# Patient Record
Sex: FEMALE | Race: WHITE | Employment: OTHER | URBAN - METROPOLITAN AREA
[De-identification: names, ages, dates, MRNs, and addresses within clinical notes are randomized per-mention and may not be internally consistent; named-entity substitution may affect disease eponyms.]

---

## 2017-11-20 ENCOUNTER — HOSPITAL ENCOUNTER (INPATIENT)
Facility: HOSPITAL | Age: 72
LOS: 9 days | Discharge: NURSING, REHAB OR LONG-TERM CARE | DRG: 987 | End: 2017-11-29
Attending: EMERGENCY MEDICINE | Admitting: INTERNAL MEDICINE
Payer: MEDICARE

## 2017-11-20 ENCOUNTER — APPOINTMENT (OUTPATIENT)
Dept: GENERAL RADIOLOGY | Facility: HOSPITAL | Age: 72
DRG: 987 | End: 2017-11-20
Attending: EMERGENCY MEDICINE
Payer: MEDICARE

## 2017-11-20 ENCOUNTER — APPOINTMENT (OUTPATIENT)
Dept: ULTRASOUND IMAGING | Facility: HOSPITAL | Age: 72
DRG: 987 | End: 2017-11-20
Attending: EMERGENCY MEDICINE
Payer: MEDICARE

## 2017-11-20 ENCOUNTER — APPOINTMENT (OUTPATIENT)
Dept: CV DIAGNOSTICS | Facility: HOSPITAL | Age: 72
DRG: 987 | End: 2017-11-20
Attending: INTERNAL MEDICINE
Payer: MEDICARE

## 2017-11-20 DIAGNOSIS — S92.345A CLOSED NONDISPLACED FRACTURE OF FOURTH METATARSAL BONE OF LEFT FOOT, INITIAL ENCOUNTER: ICD-10-CM

## 2017-11-20 DIAGNOSIS — Z78.0 POST-MENOPAUSAL: ICD-10-CM

## 2017-11-20 DIAGNOSIS — R29.6 RECURRENT FALLS: ICD-10-CM

## 2017-11-20 DIAGNOSIS — N30.00 ACUTE CYSTITIS WITHOUT HEMATURIA: ICD-10-CM

## 2017-11-20 DIAGNOSIS — I10 UNCONTROLLED HYPERTENSION: ICD-10-CM

## 2017-11-20 DIAGNOSIS — J81.0 ACUTE PULMONARY EDEMA (HCC): Primary | ICD-10-CM

## 2017-11-20 DIAGNOSIS — N95.0 POSTMENOPAUSAL BLEEDING: ICD-10-CM

## 2017-11-20 PROBLEM — S92.902A FOOT FRACTURE, LEFT, CLOSED, INITIAL ENCOUNTER: Status: ACTIVE | Noted: 2017-11-20

## 2017-11-20 PROBLEM — D50.0 IRON DEFICIENCY ANEMIA DUE TO CHRONIC BLOOD LOSS: Chronic | Status: ACTIVE | Noted: 2017-11-20

## 2017-11-20 PROBLEM — E11.9 TYPE 2 DIABETES MELLITUS WITHOUT COMPLICATION (HCC): Chronic | Status: ACTIVE | Noted: 2017-11-20

## 2017-11-20 PROCEDURE — 73630 X-RAY EXAM OF FOOT: CPT | Performed by: EMERGENCY MEDICINE

## 2017-11-20 PROCEDURE — 76856 US EXAM PELVIC COMPLETE: CPT | Performed by: EMERGENCY MEDICINE

## 2017-11-20 PROCEDURE — 93306 TTE W/DOPPLER COMPLETE: CPT | Performed by: INTERNAL MEDICINE

## 2017-11-20 PROCEDURE — 76830 TRANSVAGINAL US NON-OB: CPT | Performed by: EMERGENCY MEDICINE

## 2017-11-20 PROCEDURE — 71010 XR CHEST AP PORTABLE  (CPT=71010): CPT | Performed by: EMERGENCY MEDICINE

## 2017-11-20 PROCEDURE — 73590 X-RAY EXAM OF LOWER LEG: CPT | Performed by: EMERGENCY MEDICINE

## 2017-11-20 PROCEDURE — 72170 X-RAY EXAM OF PELVIS: CPT | Performed by: EMERGENCY MEDICINE

## 2017-11-20 RX ORDER — DEXTROSE MONOHYDRATE 25 G/50ML
50 INJECTION, SOLUTION INTRAVENOUS AS NEEDED
Status: DISCONTINUED | OUTPATIENT
Start: 2017-11-20 | End: 2017-11-29

## 2017-11-20 RX ORDER — NITROGLYCERIN 0.4 MG/1
0.4 TABLET SUBLINGUAL EVERY 5 MIN PRN
Status: DISCONTINUED | OUTPATIENT
Start: 2017-11-20 | End: 2017-11-29

## 2017-11-20 RX ORDER — MEDROXYPROGESTERONE ACETATE 5 MG/1
10 TABLET ORAL DAILY
Status: DISCONTINUED | OUTPATIENT
Start: 2017-11-20 | End: 2017-11-29

## 2017-11-20 RX ORDER — FUROSEMIDE 10 MG/ML
40 INJECTION INTRAMUSCULAR; INTRAVENOUS ONCE
Status: COMPLETED | OUTPATIENT
Start: 2017-11-20 | End: 2017-11-20

## 2017-11-20 RX ORDER — FUROSEMIDE 10 MG/ML
40 INJECTION INTRAMUSCULAR; INTRAVENOUS DAILY
Status: DISCONTINUED | OUTPATIENT
Start: 2017-11-20 | End: 2017-11-21

## 2017-11-20 RX ORDER — ATORVASTATIN CALCIUM 40 MG/1
40 TABLET, FILM COATED ORAL DAILY
Status: ON HOLD | COMMUNITY
End: 2018-02-08

## 2017-11-20 RX ORDER — ATORVASTATIN CALCIUM 40 MG/1
40 TABLET, FILM COATED ORAL NIGHTLY
Status: DISCONTINUED | OUTPATIENT
Start: 2017-11-20 | End: 2017-11-29

## 2017-11-20 RX ORDER — AMLODIPINE BESYLATE 5 MG/1
5 TABLET ORAL ONCE
Status: COMPLETED | OUTPATIENT
Start: 2017-11-20 | End: 2017-11-20

## 2017-11-20 RX ORDER — MELATONIN
650
COMMUNITY
End: 2017-11-29

## 2017-11-20 RX ORDER — ACETAMINOPHEN 325 MG/1
650 TABLET ORAL EVERY 6 HOURS PRN
Status: DISCONTINUED | OUTPATIENT
Start: 2017-11-20 | End: 2017-11-29

## 2017-11-20 RX ORDER — GLIPIZIDE 10 MG/1
20 TABLET ORAL
COMMUNITY
End: 2017-11-29

## 2017-11-20 RX ORDER — GLIPIZIDE 10 MG/1
10 TABLET ORAL
Status: DISCONTINUED | OUTPATIENT
Start: 2017-11-20 | End: 2017-11-22

## 2017-11-20 RX ORDER — RAMIPRIL 10 MG/1
20 CAPSULE ORAL DAILY
COMMUNITY
End: 2017-11-29

## 2017-11-20 RX ORDER — ENALAPRILAT 2.5 MG/2ML
1.25 INJECTION INTRAVENOUS ONCE
Status: COMPLETED | OUTPATIENT
Start: 2017-11-20 | End: 2017-11-20

## 2017-11-20 RX ORDER — AMLODIPINE BESYLATE 5 MG/1
5 TABLET ORAL 2 TIMES DAILY
Status: DISCONTINUED | OUTPATIENT
Start: 2017-11-20 | End: 2017-11-29

## 2017-11-20 RX ORDER — SODIUM CHLORIDE 0.9 % (FLUSH) 0.9 %
3 SYRINGE (ML) INJECTION AS NEEDED
Status: DISCONTINUED | OUTPATIENT
Start: 2017-11-20 | End: 2017-11-29

## 2017-11-20 RX ORDER — MELATONIN
650 2 TIMES DAILY WITH MEALS
Status: DISCONTINUED | OUTPATIENT
Start: 2017-11-20 | End: 2017-11-29

## 2017-11-20 RX ORDER — RAMIPRIL 10 MG/1
20 CAPSULE ORAL DAILY
Status: DISCONTINUED | OUTPATIENT
Start: 2017-11-20 | End: 2017-11-21

## 2017-11-20 NOTE — CONSULTS
Juan NOTE  Cardiology Consultation    Umberto Mendez Patient Status:  Emergency    1945 MRN [id number]   Location [address] Attending Ruy Isaacs MD   Hosp Day # 0 PCP Janeth Stahl MD     Reason f chair most of the time.   Will gently diurese with IV Lasix 40 mg daily and keep her legs elevated one half hours per shift will follow renal function carefully    Hypertension poorly controlled and pull poorly followed will adjust her medicines here and sh headaches  ENT no sinusitis   Chest no cough no hemoptysis  GI no hematemesis no melena   no dysuria hematuria patient admits to vaginal bleeding    Neurologic no TIA-like symptoms  Skin no rashes       Intake/Output:   No intake or output data in the 24

## 2017-11-20 NOTE — PLAN OF CARE
Problem: Diabetes/Glucose Control  Goal: Glucose maintained within prescribed range  INTERVENTIONS:  - Monitor Blood Glucose as ordered  - Assess for signs and symptoms of hyperglycemia and hypoglycemia  - Administer ordered medications to maintain glucose and limitations with patient/family  Outcome: Progressing    Goal: Maintain proper alignment of affected body part  INTERVENTIONS:  - Support and protect limb and body alignment per provider's orders  - Instruct and reinforce with patient and family use of

## 2017-11-20 NOTE — ED PROVIDER NOTES
Patient Seen in: [facility] AND [facility] Emergency Department    History   Patient presents with:  Fall (musculoskeletal, neurologic)    Stated Complaint: fall; knees gave out    HPI    74year old female with osteoarthritis s/p R TKA, DM, HTN, hyperlipidemia, Eyes: Conjunctivae and EOM are normal. Pupils are equal, round, and reactive to light. Neck: Normal range of motion and full passive range of motion without pain. Neck supple.    Cardiovascular: Normal rate, regular rhythm, normal heart sounds and intact RBC URINE 396 (*)     Bacteria Urine Moderate (*)     All other components within normal limits   BNP (B TYPE NATRIUERTIC PEPTIDE) - Abnormal; Notable for the following:     Beta Natriuretic Peptide 922 (*)     All other components within normal limits ED Course as of Nov 20 2037  ------------------------------------------------------------       [facility]       Pulse Ox: 99%, Normal, RA    Cardiac Monitor: Pulse Readings from Last 1 Encounters:  11/20/17 : 74  , sinus, normal for rate and rhythm     Radiology CONCLUSION:  1. Limited evaluation secondary to suboptimal inspiration and patient rotation. . 2. Mild cardiomegaly which is likely exaggerated by the semiupright technique. 3. Minimal left lateral pleural thickening (lower left hemithorax).  4. No evidence

## 2017-11-20 NOTE — CONSULTS
Jimmy An  [id number]  2017  4:16 PM    GYNE H&P    The patient is a 74year old  presenting with PMB. She reports the bleeding began as spotting about 3 years ago and has slowly increased since.   She wears a poise pad daily and dose not She prefers to attempt office EMB first.  We reviewed possible connection between her bleeding and her anemia.   The above was also d/w Dr. Nancy Castro who agrees that biopsy can be deferred until her current medical condition improves, at which time we will a

## 2017-11-20 NOTE — ED NOTES
Pt voided x 1; vaginal bleeding noted - per patient, that has been an ongoing problem for the last few years. She was suppose to see a gynecologist but never got around to it.

## 2017-11-20 NOTE — PROGRESS NOTES
[facility] Pharmacy Note: Antimicrobial Weight Dose Adjustment for: Rocephin (ceftriaxone)    Bon Davis is a 74year old female who has been prescribed Rocephin (ceftriaxone) 1 gm every x1 dose.   CrCl is estimated creatinine clearance is 28.4 mL/min (based on S

## 2017-11-21 ENCOUNTER — APPOINTMENT (OUTPATIENT)
Dept: ULTRASOUND IMAGING | Facility: HOSPITAL | Age: 72
DRG: 987 | End: 2017-11-21
Attending: INTERNAL MEDICINE
Payer: MEDICARE

## 2017-11-21 PROBLEM — E87.5 HYPERKALEMIA: Status: ACTIVE | Noted: 2017-11-21

## 2017-11-21 PROCEDURE — 5A09357 ASSISTANCE WITH RESPIRATORY VENTILATION, LESS THAN 24 CONSECUTIVE HOURS, CONTINUOUS POSITIVE AIRWAY PRESSURE: ICD-10-PCS | Performed by: INTERNAL MEDICINE

## 2017-11-21 PROCEDURE — 99223 1ST HOSP IP/OBS HIGH 75: CPT | Performed by: INTERNAL MEDICINE

## 2017-11-21 PROCEDURE — 76770 US EXAM ABDO BACK WALL COMP: CPT | Performed by: INTERNAL MEDICINE

## 2017-11-21 RX ORDER — HYDRALAZINE HYDROCHLORIDE 50 MG/1
50 TABLET, FILM COATED ORAL EVERY 8 HOURS SCHEDULED
Status: DISCONTINUED | OUTPATIENT
Start: 2017-11-21 | End: 2017-11-24

## 2017-11-21 RX ORDER — HEPARIN SODIUM 5000 [USP'U]/ML
5000 INJECTION, SOLUTION INTRAVENOUS; SUBCUTANEOUS EVERY 12 HOURS SCHEDULED
Status: DISCONTINUED | OUTPATIENT
Start: 2017-11-21 | End: 2017-11-27

## 2017-11-21 RX ORDER — FUROSEMIDE 10 MG/ML
20 INJECTION INTRAMUSCULAR; INTRAVENOUS DAILY
Status: DISCONTINUED | OUTPATIENT
Start: 2017-11-22 | End: 2017-11-22

## 2017-11-21 NOTE — PROGRESS NOTES
74year old  Admitted for evaluation of Acute pulmonary edema ([facility] [address] . Condition is stable.  . For details see H & P.

## 2017-11-21 NOTE — H&P
[address] [address] Patient Status:  Inpatient    1945 MRN [id number]   Location [facility] 3W/SW Attending Luis Padron MD   Hosp Day # 0 PCP Diann Hayden MD     Date:  2017  Date of Admi Gastrointestinal: Negative for heartburn, nausea, abdominal pain and blood in stool. Genitourinary: Negative for frequency. Musculoskeletal: Positive for myalgias, back pain, joint pain and gait problem. Left foot   Skin: Negative for rash. knee. 3. Severe tibiotalar, subtalar joint and hindfoot degeneration, left ankle. Xr Pelvis (1 View) (cpt=72170)    Result Date: 11/20/2017  CONCLUSION:  1. No acute fracture or subluxation.          Us Pelvis Ev (trns Abd And Endovag) (EET=19631,73

## 2017-11-21 NOTE — PROGRESS NOTES
Bon Ryan  [id number]  November 21, 2017  7:13 AM      GYN Progress Note    Subjective:  No complaints, resting well  Objective:  /52 (BP Location: Right arm)   Pulse 67   Temp 98 °F (36.7 °C) (Oral)   Resp 18   Ht 5' 3\" (1.6 m)   Wt (!) 383 lb 4. 8

## 2017-11-21 NOTE — PROGRESS NOTES
[facility]    Cardiology Progress Note    Cari Valadez Patient Status:  Inpatient    1945 MRN [id number]   Location [facility] 3W/SW Attending Jaiden Ritchie MD   Hosp Day # 1 PCP Kamila Beach MD     Patient Active Problem Vahe Encinas regular rate and rhythm,no pathologic murmur  Abd: Abdomen soft, nontender, nondistended,  bowel sounds present  Ext: Bilateral pedal edema  Neuro: no focal deficits   Skin: no rashes or lesions      Scheduled Meds:   • hydrALAzine HCl  50 mg Oral [facility] (lbq=[id number])    Result Date: 11/20/2017  CONCLUSION:  1. Acute nondisplaced fracture distal metadiaphyseal region of left fourth metatarsal. 2. Subtle nondisplaced fractures involving the metaphyseal bases of the left second third and fourth metatarsals.  3. -possible pulmonary disease.  ABNORMAL No previous ECGs available Electronically signed on 11/20/2017 at 18:26 by Moris Aguirre MD  11/21/2017

## 2017-11-21 NOTE — CM/SW NOTE
KANA met with the patient at bedside today. The patient lives at home with her daughter. She has a cane and walker at home and has some assistance from her daughter with showering. She has a h/o outpatient therapy as well.  Patient was transferred to CCU an

## 2017-11-21 NOTE — CONSULTS
[facility]    Report of Consultation    Bon Davis Patient Status:  Inpatient    1945 MRN [id number]   Location [facility] 2W/SW Attending Judy Valadez MD   [facility] Day # 1 PCP Mary Aguero MD     Date of Admission:   acetaminophen (TYLENOL) tab 650 mg 650 mg Oral Q6H PRN   dextrose 50% injection 50 mL 50 mL Intravenous PRN   Glucose-Vitamin C (DEX-4) 4-0.006 g chewable tab 4 tablet 4 tablet Oral Q15 Min PRN   Insulin Aspart Pen (NOVOLOG) 100 UNIT/ML flexpen 1-7 Units Data    Lab Results  Component Value Date   WBC 14.6 (H) 11/21/2017   HGB 8.9 (L) 11/21/2017   HCT 28.9 (L) 11/21/2017    11/21/2017   CREATSERUM 1.54 (H) 11/21/2017   BUN 36 (H) 11/21/2017    11/21/2017   K 5.8 (H) 11/21/2017    11/21/2 Mild cardiomegaly which is likely exaggerated by the semiupright technique. 3. Minimal left lateral pleural thickening (lower left hemithorax). 4. No evidence for pneumonia or pneumothorax. Assessment   1.   Acute hypercapnic respiratory failure

## 2017-11-21 NOTE — PROGRESS NOTES
[facility]    Progress Note    Radha Marcano Patient Status:  Inpatient    1945 MRN [id number]   Location [facility] 3W/SW Attending Eduardo Arizmendi MD   Hosp Day # 1 PCP Ramos Alaniz MD       Interval History:   Sleepy this mo results for input(s): ALKPHO, AST, ALT, BILT, TP, PT, INR, PTT in the last 72 hours. Xr Foot, Complete (min 3 Views), Left (cpt=73630)    Result Date: 11/20/2017  CONCLUSION:  1.  Acute nondisplaced fracture distal metadiaphyseal region of left fourth me progression -may be secondary to pulmonary disease consider old anterior infarct. Low voltage with rightward P-axis and rotation -possible pulmonary disease.  ABNORMAL No previous ECGs available Electronically signed on 11/20/2017 at 18:26 by Sarah You

## 2017-11-21 NOTE — PLAN OF CARE
Patient transferred to room 201, on continuous bipap for continuous monitoring. Alert/oriented x4, ABGs improved since AM.  Report called to [facility] . Belongings sent with patient.  Manpreet Cedillo MD notified. Selena Hester, [address]eban

## 2017-11-22 PROCEDURE — 99232 SBSQ HOSP IP/OBS MODERATE 35: CPT | Performed by: INTERNAL MEDICINE

## 2017-11-22 RX ORDER — MAGNESIUM SULFATE HEPTAHYDRATE 40 MG/ML
2 INJECTION, SOLUTION INTRAVENOUS
Status: DISPENSED | OUTPATIENT
Start: 2017-11-22 | End: 2017-11-22

## 2017-11-22 RX ORDER — FUROSEMIDE 10 MG/ML
40 INJECTION INTRAMUSCULAR; INTRAVENOUS
Status: DISCONTINUED | OUTPATIENT
Start: 2017-11-22 | End: 2017-11-27

## 2017-11-22 NOTE — OCCUPATIONAL THERAPY NOTE
Attempted to see pt for OT eval. Pt has closed fx of 4th metatarsal and has podiatry on consult. Will await clarification/ weight bearing orders prior to initiation of therapy evaluation. RUDY Shaffer made aware. Will continue to follow.

## 2017-11-22 NOTE — PROGRESS NOTES
[facility]    Progress Note    Umberto Mendez Patient Status:  Inpatient    1945 MRN [id number]   Location [facility] 2W/SW Attending Eliecer Myers MD   Hosp Day # 2 PCP Janeth Stahl MD       SUBJECTIVE:  Feeling somewhat be Besylate (NORVASC) tab 5 mg 5 mg Oral BID   Normal Saline Flush 0.9 % injection 3 mL 3 mL Intravenous PRN   acetaminophen (TYLENOL) tab 650 mg 650 mg Oral Q6H PRN   dextrose 50% injection 50 mL 50 mL Intravenous PRN   Glucose-Vitamin C (DEX-4) 4-0.006 g ch inpatient services that will reasonably be expected to span two midnight's based on the clinical documentation in H+P. Based on patients current state of illness, I anticipate that, after discharge, patient will require TBD.         [facility]  11/22/2

## 2017-11-22 NOTE — PROGRESS NOTES
[facility]    Cardiology Progress Note    Matthew Plaza Patient Status:  Inpatient    1945 MRN [id number]   Location [facility] 2W/SW Attending Preeti Wagner MD   Hosp Day # 2 PCP Candance Shack, MD     Patient Active Problem Jade Mercy Meds:   • furosemide  40 mg Intravenous BID (Diuretic)   • hydrALAzine HCl  50 mg Oral Q8H [facility]   • Heparin Sodium (Porcine)  5,000 Units Subcutaneous 2 times per day   • AmLODIPine Besylate  5 mg Oral BID   • Insulin Aspart Pen  1-7 Units Subcutaneous TID region of left fourth metatarsal. 2. Subtle nondisplaced fractures involving the metaphyseal bases of the left second third and fourth metatarsals.  3. Chronic pes planus deformity, left foot with advanced osteoarthritis changes involving the subtalar joint

## 2017-11-22 NOTE — PHYSICAL THERAPY NOTE
Attempted physical therapy evaluation. Per chart patient with closed fracture of left 4th metatarsal. Patient has podiatry on consult per physician progress note.  Will re-attempt as patient is appropriate for physical therapy and with clarified weight bear

## 2017-11-23 PROCEDURE — 30233N1 TRANSFUSION OF NONAUTOLOGOUS RED BLOOD CELLS INTO PERIPHERAL VEIN, PERCUTANEOUS APPROACH: ICD-10-PCS | Performed by: INTERNAL MEDICINE

## 2017-11-23 PROCEDURE — 99232 SBSQ HOSP IP/OBS MODERATE 35: CPT | Performed by: INTERNAL MEDICINE

## 2017-11-23 RX ORDER — 0.9 % SODIUM CHLORIDE 0.9 %
VIAL (ML) INJECTION
Status: COMPLETED
Start: 2017-11-23 | End: 2017-11-23

## 2017-11-23 RX ORDER — ONDANSETRON 2 MG/ML
4 INJECTION INTRAMUSCULAR; INTRAVENOUS EVERY 6 HOURS PRN
Status: DISCONTINUED | OUTPATIENT
Start: 2017-11-23 | End: 2017-11-29

## 2017-11-23 RX ORDER — ONDANSETRON 2 MG/ML
INJECTION INTRAMUSCULAR; INTRAVENOUS
Status: COMPLETED
Start: 2017-11-23 | End: 2017-11-23

## 2017-11-23 RX ORDER — SODIUM CHLORIDE 0.9 % (FLUSH) 0.9 %
10 SYRINGE (ML) INJECTION AS NEEDED
Status: DISCONTINUED | OUTPATIENT
Start: 2017-11-23 | End: 2017-11-29

## 2017-11-23 RX ORDER — SODIUM CHLORIDE 9 MG/ML
INJECTION, SOLUTION INTRAVENOUS ONCE
Status: COMPLETED | OUTPATIENT
Start: 2017-11-23 | End: 2017-11-23

## 2017-11-23 RX ORDER — DEXTROSE AND SODIUM CHLORIDE 5; .45 G/100ML; G/100ML
INJECTION, SOLUTION INTRAVENOUS CONTINUOUS
Status: DISCONTINUED | OUTPATIENT
Start: 2017-11-23 | End: 2017-11-24

## 2017-11-23 NOTE — PROGRESS NOTES
[facility]     Progress Note        Jessica William Patient Status:  Inpatient    1945 MRN [id number]   Location [facility] 2W/SW Attending Leonides Booker MD   Hosp Day # 3 PCP Esteban Garcia MD       Subjective:   Patient seen an chloride 0.9 % 100 mL IVPB-minibag/addvantage 2 g Intravenous Q24H   medroxyPROGESTERone (PROVERA) tab 10 mg Oral Daily   influenza virus vaccine (FLUAD) ages 72 years and older inj 0.5ml 0.5 mL Intramuscular Prior to discharge       Continuous Infusions:

## 2017-11-23 NOTE — PLAN OF CARE
Problem: Diabetes/Glucose Control  Goal: Glucose maintained within prescribed range  INTERVENTIONS:  - Monitor Blood Glucose as ordered  - Assess for signs and symptoms of hyperglycemia and hypoglycemia  - Administer ordered medications to maintain glucose indicated  - Evaluate effectiveness of antiarrhythmic and heart rate control medications as ordered  - Initiate emergency measures for life threatening arrhythmias  - Monitor electrolytes and administer replacement therapy as ordered   Outcome: Progressing

## 2017-11-23 NOTE — PROGRESS NOTES
[facility]    Cardiology Progress Note    Ladan Form Patient Status:  Inpatient    1945 MRN [id number]   Location [facility] 2W/SW Attending Pedro Luis Chatman MD   Hosp Day # 3 PCP Brooklynn Nicholas MD     Patient Active Problem Spring Paige sulfate  650 mg Oral BID with meals   • cefTRIAXone  2 g Intravenous Q24H   • MedroxyPROGESTERone Acetate  10 mg Oral Daily         Results:        Recent Labs   Lab  11/20/17   0833  11/21/17   0632  11/22/17   0408  11/23/17   0428   WBC  8.0  14.6*  8.5

## 2017-11-23 NOTE — PROGRESS NOTES
[facility]    Progress Note    Chin Huerta Patient Status:  Inpatient    1945 MRN [id number]   Location [facility] 2W/SW Attending Kiran Esquivel MD   Hosp Day # 3 PCP Emelina Couch MD     Subjective:   Subjective:denies an

## 2017-11-24 PROBLEM — J81.0 ACUTE PULMONARY EDEMA (HCC): Status: RESOLVED | Noted: 2017-11-20 | Resolved: 2017-11-24

## 2017-11-24 PROBLEM — I27.20 PULMONARY HYPERTENSION, MODERATE TO SEVERE (HCC): Status: ACTIVE | Noted: 2017-11-24

## 2017-11-24 PROBLEM — I50.31 ACUTE DIASTOLIC CONGESTIVE HEART FAILURE (HCC): Status: ACTIVE | Noted: 2017-11-24

## 2017-11-24 PROBLEM — I10 UNCONTROLLED HYPERTENSION: Status: RESOLVED | Noted: 2017-11-20 | Resolved: 2017-11-24

## 2017-11-24 PROCEDURE — 99232 SBSQ HOSP IP/OBS MODERATE 35: CPT | Performed by: INTERNAL MEDICINE

## 2017-11-24 RX ORDER — MAGNESIUM OXIDE 400 MG (241.3 MG MAGNESIUM) TABLET
400 TABLET ONCE
Status: COMPLETED | OUTPATIENT
Start: 2017-11-24 | End: 2017-11-24

## 2017-11-24 RX ORDER — DOCUSATE SODIUM 100 MG/1
100 CAPSULE, LIQUID FILLED ORAL 2 TIMES DAILY
Status: DISCONTINUED | OUTPATIENT
Start: 2017-11-24 | End: 2017-11-29

## 2017-11-24 NOTE — PHYSICAL THERAPY NOTE
PHYSICAL THERAPY EVALUATION - INPATIENT     Room Number: 547/547-A  Evaluation Date: 11/24/2017  Type of Evaluation;  Initial   Physician Order: PT Eval and Treat    Presenting Problem: L foot 4th metatarsal fracture, biventricular failure, severe pulmo She could not recall the podiatrist coming in to see her this morning and was unaware of her weightbearing status.  PT instructed her in supine therex and overhead trapeze, attempted bed mobility (TOTAL A) and then attempted to use mechanics of the bed to b Foot fracture, left, closed, initial encounter    Essential hypertension    Type 2 diabetes mellitus without complication (HCC)    Iron deficiency anemia due to chronic blood loss    Acute cystitis without hematuria    Recurrent falls    Closed nondisplace is unable to actively flex the R knee but tolerates a few degrees of PROM. Note prior R TKA. R ankle- can actively DF/PF but lacks full range. Can only wiggle toes on LLE, no other active movement noted at L hip or knee.      Lower extremity strength is pro able to participate in transfers at this time due to body habitus and low level of arousal.     Exercise/Education Provided:  UE AROM, pull up on trapeze, AROM RLE, educated on WBS- poor return demo due to patient grogginess. Patient End of Session:  In

## 2017-11-24 NOTE — PROGRESS NOTES
[facility]    Progress Note    Jose Luis Esquivel Patient Status:  Inpatient    1945 MRN [id number]   Location [facility] 5SW/SE Attending Maggy North MD   Hosp Day # 4 PCP Josey Crockett MD     Interval History:   Not in distress GLU  51*   --   82  186*   BUN  38*   --   33*  32*   CREATSERUM  1.30   --   1.08  1.05   GFRAA  48*   --   60  >60   GFRNAA  40*   --   50*  51*   CA  8.0*   --   8.2*  8.2*   NA  141   --   138  135*   K  5.1   --   5.1  5.7*   CL  106   --   100  98

## 2017-11-24 NOTE — CM/SW NOTE
Podiatry recommendation is for pt to be non-weightbearing for 3-4weeks. Podiatrist had spoken with the pt about dme needs, such as knee scooter or wheelchair.  Both of these would be on a rental basis as pt's condition is not chronic to qualify for insuranc

## 2017-11-24 NOTE — PROGRESS NOTES
[facility]    Cardiology Progress Note    Vinh Duffy Patient Status:  Inpatient    1945 MRN [id number]   Location [facility] 5SW/SE Attending Dorothea Forbes MD   Hosp Day # 4 PCP Jennifer Cabrera MD     Patient Active Problem L with regular rate and rhythm,no pathologic murmur  Abd: Abdomen soft, nontender, nondistended,  bowel sounds present  Ext: A bit difficult to assess but probably still about 1+ bilateral pedal edema  Neuro: no focal deficits  Skin: no rashes or lesions 1540 (8.9) 336 (1.9)    Urine (mL/kg/hr) 2250 (0.5) 4375 (1.1) 1200 (0.9)    Emesis/NG output  0 (0)     Other  0 (0)     Total Output 2250 4375 1200    Net -8905 -6756 -864           Emesis Occurrence  1 x              No results for input(s): BNP in the

## 2017-11-24 NOTE — PROGRESS NOTES
[facility]     Progress Note         Sara Patient Status:  Inpatient    1945 MRN [id number]   Location [facility] 2W/SW Attending Flash Kent MD   Hosp Day # 4 PCP Mandy Hickey MD       Subjective:   Patient seen an 10 mg Oral Daily   influenza virus vaccine (FLUAD) ages 72 years and older inj 0.5ml 0.5 mL Intramuscular Prior to discharge       Continuous Infusions:       Physical Exam  Constitutional: no acute distress  HEENT: PERRL  Cardio: RRR, S1 S2  Respiratory:

## 2017-11-24 NOTE — CONSULTS
[facility]    Report of Consultation    Reginia Riser Patient Status:  Inpatient    1945 MRN [id number]   Location [facility] 5SW/SE Attending Nazia Nichole MD   [facility] Day # 4 PCP Alyce Jimenez MD     Date of Admission:  / Intravenous Q6H PRN   furosemide (LASIX) injection 40 mg 40 mg Intravenous BID (Diuretic)   Miconazole Nitrate 2 % powder  Topical Perez@[employer]   hydrALAzine HCl (APRESOLINE) tab 50 mg 50 mg Oral Q8H [facility]   Heparin Sodium (Porcine) 5000 UNIT/ML injection 5 seconds to all digits. Left Foot - DP pulse 1/4  PT pulse 1/4. Edema present to ankle, less notable to left foot than right. Capillary refill time <3 seconds to all digits.  Digital hair is absent to digits, bilateral. The skin is thin, shiny atrophic immobilization and protection. Discussed witih patient healing rate and that full healing would not be expected for 8-10 weeks. Discussed recommendation to avoid weightbearing on the left side.  Discussed roll-about knee scooter, which patient relates kn

## 2017-11-24 NOTE — PLAN OF CARE
Problem: Diabetes/Glucose Control  Goal: Glucose maintained within prescribed range  INTERVENTIONS:  - Monitor Blood Glucose as ordered  - Assess for signs and symptoms of hyperglycemia and hypoglycemia  - Administer ordered medications to maintain glucose function  INTERVENTIONS:  - Assess patient stability and activity tolerance for standing, transferring and ambulating w/ or w/o assistive devices  - Assist with transfers and ambulation using safe patient handling equipment as needed  - Ensure adequate pro

## 2017-11-25 PROCEDURE — 99232 SBSQ HOSP IP/OBS MODERATE 35: CPT | Performed by: INTERNAL MEDICINE

## 2017-11-25 RX ORDER — POLYETHYLENE GLYCOL 3350 17 G/17G
17 POWDER, FOR SOLUTION ORAL 2 TIMES DAILY
Status: DISCONTINUED | OUTPATIENT
Start: 2017-11-25 | End: 2017-11-29

## 2017-11-25 RX ORDER — MAGNESIUM OXIDE 400 MG (241.3 MG MAGNESIUM) TABLET
400 TABLET ONCE
Status: COMPLETED | OUTPATIENT
Start: 2017-11-25 | End: 2017-11-25

## 2017-11-25 NOTE — OCCUPATIONAL THERAPY NOTE
OCCUPATIONAL THERAPY EVALUATION - INPATIENT     Room Number: 547/547-A  Evaluation Date: 11/25/2017  Type of Evaluation: Initial  Presenting Problem: multiple falls, left 4th metatarsal fx    Physician Order: IP Consult to Occupational Therapy  Reason for Closed nondisplaced fracture of fourth metatarsal bone of left foot, initial encounter    Postmenopausal bleeding    Hyperkalemia    Pulmonary hypertension, moderate to severe    Acute diastolic congestive heart failure ([facility] [address]      Past Medical History  Pa NEUROLOGICAL FINDINGS  Neurological Findings: None                  ACTIVITIES OF DAILY LIVING ASSESSMENT  AM-PAC ‘6-Clicks’ Inpatient Daily Activity Short Form  How much help from another person does the patient currently need…  -   Putting on and darby 12/9/17  Frequency: 3-5x/week

## 2017-11-25 NOTE — PLAN OF CARE
Problem: Diabetes/Glucose Control  Goal: Glucose maintained within prescribed range  INTERVENTIONS:  - Monitor Blood Glucose as ordered  - Assess for signs and symptoms of hyperglycemia and hypoglycemia  - Administer ordered medications to maintain glucose of antiarrhythmic and heart rate control medications as ordered  - Initiate emergency measures for life threatening arrhythmias  - Monitor electrolytes and administer replacement therapy as ordered   Outcome: Progressing      Problem: MUSCULOSKELETAL - ULICES and document risk factors for pressure ulcer development  - Assess and document skin integrity  - Monitor for areas of redness and/or skin breakdown  - Initiate interventions, skin care algorithm/standards of care as needed   Outcome: Progressing    Goal:

## 2017-11-25 NOTE — PROGRESS NOTES
[facility]    Progress Note    Jessica William Patient Status:  Inpatient    1945 MRN [id number]   Location [facility] 5SW/SE Attending Leonides Booker MD   Hosp Day # 5 PCP Esteban Garcia MD       Subjective:   Jessica William is a(n)  (H) 11/25/2017   CA 8.2 (L) 11/25/2017   TSH 3.15 11/21/2017   MG 1.8 11/25/2017   TROP 0.03 11/20/2017                   CHRISTINA EDGE MD  11/25/2017

## 2017-11-25 NOTE — PROGRESS NOTES
[facility]     Progress Note        Bill Reyna Patient Status:  Inpatient    1945 MRN [id number]   Location [facility] 2W/SW Attending Wendolyn Sever, MD   Hosp Day # 5 PCP Charlene Kaye MD       Subjective:   Patient seen an Oral Daily   influenza virus vaccine (FLUAD) ages 72 years and older inj 0.5ml 0.5 mL Intramuscular Prior to discharge       Continuous Infusions:       Physical Exam  Constitutional: no acute distress  HEENT: PERRL  Cardio: RRR, S1 S2  Respiratory: clear

## 2017-11-25 NOTE — PLAN OF CARE
Problem: Diabetes/Glucose Control  Goal: Glucose maintained within prescribed range  INTERVENTIONS:  - Monitor Blood Glucose as ordered  - Assess for signs and symptoms of hyperglycemia and hypoglycemia  - Administer ordered medications to maintain glucose w/ or w/o assistive devices  - Assist with transfers and ambulation using safe patient handling equipment as needed  - Ensure adequate protection for wounds/incisions during mobilization  - Obtain PT/OT consults as needed  - Advance activity as appropriate

## 2017-11-25 NOTE — PROGRESS NOTES
[facility] Heart Cardiology Progress Note      Julia Robles Patient Status:  Inpatient    1945 MRN [id number]   Location [facility] 5SW/SE Attending Amanda Hahn MD   Hosp Day # 5 PCP Emily Aguiar MD     ------ no clubbing, no cyanosis, bilat LE edema, casted Left foot  Neuro: no focal deficits  Skin: no rashes or lesions    Scheduled Meds:   • magnesium oxide  400 mg Oral Once   • docusate sodium  100 mg Oral BID   • hydrALAzine HCl  75 mg Oral Q8H [address]   • furo

## 2017-11-26 PROCEDURE — 99232 SBSQ HOSP IP/OBS MODERATE 35: CPT | Performed by: INTERNAL MEDICINE

## 2017-11-26 RX ORDER — ISOSORBIDE DINITRATE 10 MG/1
10 TABLET ORAL
Status: DISCONTINUED | OUTPATIENT
Start: 2017-11-26 | End: 2017-11-29

## 2017-11-26 NOTE — PLAN OF CARE
Patient refuse to use BIPAP. Educated her about the importance of the Bipap machine,  patient still declines to use.

## 2017-11-26 NOTE — PROGRESS NOTES
[facility]    Progress Note    Lexi Feliciano Patient Status:  Inpatient    1945 MRN [id number]   Location [facility] 5SW/SE Attending Diana Casanova MD   Hosp Day # 6 PCP Simba Purcell MD       Subjective:   Lexi Feliciano is a(n) 11/26/2017   HGB 7.8 (L) 11/26/2017   HCT 24.4 (L) 11/26/2017    11/26/2017   CREATSERUM 1.09 11/26/2017   BUN 36 (H) 11/26/2017    (L) 11/26/2017   K 5.9 (H) 11/26/2017   CL 92 (L) 11/26/2017   CO2 31 11/26/2017    (H) 11/26/2017   CA

## 2017-11-26 NOTE — PROGRESS NOTES
[facility] Heart Cardiology Progress Note      Norah Herron Patient Status:  Inpatient    1945 MRN [id number]   Location [facility] 5SW/SE Attending Amanda Hair MD   Hosp Day # 6 PCP Oscar West MD       ---- soft, nontender, nondistended, no organomegaly, bowel sounds present  Ext:  no clubbing, no cyanosis, 2+ bilat edema  Neuro: no focal deficits  Skin: no rashes or lesions    Scheduled Meds:   • PEG 3350  17 g Oral BID   • docusate sodium  100 mg Oral BID

## 2017-11-26 NOTE — PROGRESS NOTES
[facility] - [facility]     Progress Note        Cyndi Morse Patient Status:  Inpatient    1945 MRN [id number]   Location [facility] 2W/SW Attending Eddie Lutz MD   Hosp Day # 6 PCP Jason Blevins MD       Subjective:   Patient seen an ferrous sulfate EC tab 325 mg 650 mg Oral BID with meals   medroxyPROGESTERone (PROVERA) tab 10 mg Oral Daily   influenza virus vaccine (FLUAD) ages 72 years and older inj 0.5ml 0.5 mL Intramuscular Prior to discharge       Continuous Infusions:       Ph

## 2017-11-26 NOTE — PHYSICAL THERAPY NOTE
PHYSICAL THERAPY TREATMENT NOTE - INPATIENT    Room Number: 547/547-A       Presenting Problem: L foot 4th metatarsal fracture, biventricular failure, severe pulmonary hypertension, chronic renal insufficieny , fall at home    Problem List  Active Problem Static Sitting: Not tested  Dynamic Sitting: Not tested           Static Standing: Not tested  Dynamic Standing: Not tested    ACTIVITY TOLERANCE #2  Current Status NT   Goal #3 Patient to demonstrate active participation with home activity/exercise instructions provided to patient in preparation for discharge.    Goal #3   Current Status IN PROGRESS   Goal #4    Goal #4   Current Status    Goal #5

## 2017-11-26 NOTE — PLAN OF CARE
Problem: Diabetes/Glucose Control  Goal: Glucose maintained within prescribed range  INTERVENTIONS:  - Monitor Blood Glucose as ordered  - Assess for signs and symptoms of hyperglycemia and hypoglycemia  - Administer ordered medications to maintain glucose control medications as ordered  - Initiate emergency measures for life threatening arrhythmias  - Monitor electrolytes and administer replacement therapy as ordered   Outcome: Progressing      Problem: MUSCULOSKELETAL - ADULT  Goal: Return mobility to safe development  - Assess and document skin integrity  - Monitor for areas of redness and/or skin breakdown  - Initiate interventions, skin care algorithm/standards of care as needed   Outcome: Progressing    Goal: Oral mucous membranes remain intact  INTERVEN

## 2017-11-26 NOTE — PLAN OF CARE
Problem: Diabetes/Glucose Control  Goal: Glucose maintained within prescribed range  INTERVENTIONS:  - Monitor Blood Glucose as ordered  - Assess for signs and symptoms of hyperglycemia and hypoglycemia  - Administer ordered medications to maintain glucose Obtain PT/OT consults as needed  - Advance activity as appropriate  - Communicate ordered activity level and limitations with patient/family   Outcome: Progressing      Problem: RESPIRATORY - ADULT  Goal: Achieves optimal ventilation and oxygenation  INTER

## 2017-11-27 RX ORDER — SODIUM CHLORIDE 9 MG/ML
INJECTION, SOLUTION INTRAVENOUS ONCE
Status: COMPLETED | OUTPATIENT
Start: 2017-11-27 | End: 2017-11-27

## 2017-11-27 RX ORDER — SODIUM CHLORIDE 9 MG/ML
INJECTION, SOLUTION INTRAVENOUS
Status: COMPLETED
Start: 2017-11-27 | End: 2017-11-27

## 2017-11-27 RX ORDER — GLIMEPIRIDE 2 MG/1
2 TABLET ORAL
Status: DISCONTINUED | OUTPATIENT
Start: 2017-11-28 | End: 2017-11-29

## 2017-11-27 RX ORDER — SODIUM CHLORIDE 9 MG/ML
INJECTION, SOLUTION INTRAVENOUS
Status: DISPENSED
Start: 2017-11-27 | End: 2017-11-28

## 2017-11-27 RX ORDER — SODIUM CHLORIDE 0.9 % (FLUSH) 0.9 %
10 SYRINGE (ML) INJECTION AS NEEDED
Status: DISCONTINUED | OUTPATIENT
Start: 2017-11-27 | End: 2017-11-29

## 2017-11-27 RX ORDER — FUROSEMIDE 10 MG/ML
20 INJECTION INTRAMUSCULAR; INTRAVENOUS ONCE
Status: COMPLETED | OUTPATIENT
Start: 2017-11-27 | End: 2017-11-27

## 2017-11-27 NOTE — CM/SW NOTE
SW met w/ pt to discuss PT/OT recommendations of SNF. Pt agreeable. Pt has no hx of rehab. SW provided SNF list. Pt reported that her dtr will be at the hospital later and she will go over SNF list w/ dtr at this time. SW contacted [facility] for DON screen.

## 2017-11-27 NOTE — PROGRESS NOTES
[facility]    Cardiology Progress Note    Pedro Jordan Patient Status:  Inpatient    1945 MRN [id number]   Location [facility] 5SW/SE Attending Isabel Ho MD   Hosp Day # 7 PCP Charlie Obregon MD     Patient Active Problem L focal deficits  Skin: no rashes or lesions      Scheduled Meds:   • [START ON 11/28/2017] furosemide  60 mg Oral Daily   • isosorbide dinitrate  10 mg Oral TID (Nitrates)   • PEG 3350  17 g Oral BID   • docusate sodium  100 mg Oral BID   • hydrALAzine HCl

## 2017-11-27 NOTE — PROGRESS NOTES
[facility]    Progress Note    Lowell Phelan Patient Status:  Inpatient    1945 MRN [id number]   Location [facility] 5SW/SE Attending Jody Park MD   Hosp Day # 7 PCP Meggan Castillo MD     Interval History:   Not in distress Lab  11/25/17   0510  11/26/17   0447  11/27/17   0425   GLU  179*  158*  137*   BUN  34*  36*  38*   CREATSERUM  1.13  1.09  1.03   GFRAA  57*  59*  >60   GFRNAA  47*  49*  52*   CA  8.2*  8.7  8.4*   NA  134*  134*  132*   K  5.9*  5.9*  5.8*   CL  95

## 2017-11-27 NOTE — PHYSICAL THERAPY NOTE
PHYSICAL THERAPY TREATMENT NOTE - INPATIENT    Room Number: 547/547-A       Presenting Problem: L foot 4th metatarsal fracture, biventricular failure, severe pulmonary hypertension, chronic renal insufficieny , fall at home    Problem List  Active Problem training    SUBJECTIVE  Pt was agreeable for therapy session.      OBJECTIVE  Precautions: Limb alert - left    WEIGHT BEARING RESTRICTION  Weight Bearing Restriction: L lower extremity           L Lower Extremity: Non-Weight Bearing    PAIN ASSESSMENT   Ra be met by: 12/1/17  Patient Goal Patient's self-stated goal is: none stated   Goal #1 Patient is able to demonstrate supine - sit EOB @ level: MAX A then sit EOB for 5 minutes with SBA in preparation for transfers.       Goal #1   Current Status Max A x2 fo

## 2017-11-27 NOTE — OCCUPATIONAL THERAPY NOTE
OCCUPATIONAL THERAPY TREATMENT NOTE - INPATIENT     Room Number: 547/547-A      Presenting Problem: multiple falls, left 4th metatarsal fx    Problem List  Active Problems:    Foot fracture, left, closed, initial encounter    Essential hypertension    Type Device Recommendations: Reacher;Sock aid;Long-handled sponge      PLAN  OT Treatment Plan: Balance activities; ADL training;Functional transfer training;UE strengthening/ROM; Endurance training      SUBJECTIVE  \"I am ok\"    OBJECTIVE  Precautions: Limb waqar Comment: patient tolerated <5 min    Patient will complete functional transfer with Max A x2 maintaining NWB LLE  Comment: unable    Patient will complete BUE AROM strengthening HEP independently  Comment: NT      Comment:            Goals  on: /

## 2017-11-28 ENCOUNTER — ANESTHESIA EVENT (OUTPATIENT)
Dept: SURGERY | Facility: HOSPITAL | Age: 72
DRG: 987 | End: 2017-11-28
Payer: MEDICARE

## 2017-11-28 ENCOUNTER — ANESTHESIA (OUTPATIENT)
Dept: SURGERY | Facility: HOSPITAL | Age: 72
DRG: 987 | End: 2017-11-28
Payer: MEDICARE

## 2017-11-28 ENCOUNTER — SURGERY (OUTPATIENT)
Age: 72
End: 2017-11-28

## 2017-11-28 PROCEDURE — 0UDB8ZX EXTRACTION OF ENDOMETRIUM, VIA NATURAL OR ARTIFICIAL OPENING ENDOSCOPIC, DIAGNOSTIC: ICD-10-PCS | Performed by: OBSTETRICS & GYNECOLOGY

## 2017-11-28 RX ORDER — HYDROCODONE BITARTRATE AND ACETAMINOPHEN 5; 325 MG/1; MG/1
2 TABLET ORAL AS NEEDED
Status: DISCONTINUED | OUTPATIENT
Start: 2017-11-28 | End: 2017-11-28 | Stop reason: HOSPADM

## 2017-11-28 RX ORDER — FUROSEMIDE 20 MG/1
60 TABLET ORAL DAILY
Qty: 90 TABLET | Refills: 0 | Status: SHIPPED | OUTPATIENT
Start: 2017-11-28 | End: 2017-12-28

## 2017-11-28 RX ORDER — ONDANSETRON 2 MG/ML
INJECTION INTRAMUSCULAR; INTRAVENOUS AS NEEDED
Status: DISCONTINUED | OUTPATIENT
Start: 2017-11-28 | End: 2017-11-28 | Stop reason: SURG

## 2017-11-28 RX ORDER — SODIUM CHLORIDE, SODIUM LACTATE, POTASSIUM CHLORIDE, CALCIUM CHLORIDE 600; 310; 30; 20 MG/100ML; MG/100ML; MG/100ML; MG/100ML
INJECTION, SOLUTION INTRAVENOUS CONTINUOUS
Status: DISCONTINUED | OUTPATIENT
Start: 2017-11-28 | End: 2017-11-29

## 2017-11-28 RX ORDER — NALOXONE HYDROCHLORIDE 0.4 MG/ML
80 INJECTION, SOLUTION INTRAMUSCULAR; INTRAVENOUS; SUBCUTANEOUS AS NEEDED
Status: DISCONTINUED | OUTPATIENT
Start: 2017-11-28 | End: 2017-11-28 | Stop reason: HOSPADM

## 2017-11-28 RX ORDER — ISOSORBIDE DINITRATE 10 MG/1
10 TABLET ORAL
Qty: 90 TABLET | Refills: 0 | Status: SHIPPED | OUTPATIENT
Start: 2017-11-28 | End: 2017-12-28

## 2017-11-28 RX ORDER — GLIMEPIRIDE 2 MG/1
2 TABLET ORAL
Qty: 30 TABLET | Refills: 0 | Status: SHIPPED | OUTPATIENT
Start: 2017-11-29 | End: 2017-12-29

## 2017-11-28 RX ORDER — AMLODIPINE BESYLATE 5 MG/1
5 TABLET ORAL 2 TIMES DAILY
Qty: 60 TABLET | Refills: 0 | Status: SHIPPED | OUTPATIENT
Start: 2017-11-28 | End: 2017-12-28

## 2017-11-28 RX ORDER — HYDROMORPHONE HYDROCHLORIDE 1 MG/ML
0.6 INJECTION, SOLUTION INTRAMUSCULAR; INTRAVENOUS; SUBCUTANEOUS EVERY 5 MIN PRN
Status: DISCONTINUED | OUTPATIENT
Start: 2017-11-28 | End: 2017-11-28 | Stop reason: HOSPADM

## 2017-11-28 RX ORDER — HYDROCODONE BITARTRATE AND ACETAMINOPHEN 5; 325 MG/1; MG/1
1 TABLET ORAL AS NEEDED
Status: DISCONTINUED | OUTPATIENT
Start: 2017-11-28 | End: 2017-11-28 | Stop reason: HOSPADM

## 2017-11-28 RX ORDER — HYDRALAZINE HYDROCHLORIDE 25 MG/1
75 TABLET, FILM COATED ORAL EVERY 8 HOURS SCHEDULED
Qty: 270 TABLET | Refills: 0 | Status: SHIPPED | OUTPATIENT
Start: 2017-11-28 | End: 2017-12-28

## 2017-11-28 RX ORDER — SODIUM CHLORIDE 9 MG/ML
INJECTION, SOLUTION INTRAVENOUS
Status: COMPLETED
Start: 2017-11-28 | End: 2017-11-28

## 2017-11-28 RX ORDER — HYDROMORPHONE HYDROCHLORIDE 1 MG/ML
0.4 INJECTION, SOLUTION INTRAMUSCULAR; INTRAVENOUS; SUBCUTANEOUS EVERY 5 MIN PRN
Status: DISCONTINUED | OUTPATIENT
Start: 2017-11-28 | End: 2017-11-28 | Stop reason: HOSPADM

## 2017-11-28 RX ORDER — MELATONIN
650 2 TIMES DAILY WITH MEALS
Qty: 120 TABLET | Refills: 0 | Status: SHIPPED | OUTPATIENT
Start: 2017-11-28 | End: 2017-12-28

## 2017-11-28 RX ORDER — GLYCOPYRROLATE 0.2 MG/ML
INJECTION INTRAMUSCULAR; INTRAVENOUS AS NEEDED
Status: DISCONTINUED | OUTPATIENT
Start: 2017-11-28 | End: 2017-11-28 | Stop reason: SURG

## 2017-11-28 RX ORDER — SODIUM CHLORIDE, SODIUM LACTATE, POTASSIUM CHLORIDE, CALCIUM CHLORIDE 600; 310; 30; 20 MG/100ML; MG/100ML; MG/100ML; MG/100ML
INJECTION, SOLUTION INTRAVENOUS CONTINUOUS PRN
Status: DISCONTINUED | OUTPATIENT
Start: 2017-11-28 | End: 2017-11-28 | Stop reason: SURG

## 2017-11-28 RX ORDER — HALOPERIDOL 5 MG/ML
0.25 INJECTION INTRAMUSCULAR ONCE AS NEEDED
Status: DISCONTINUED | OUTPATIENT
Start: 2017-11-28 | End: 2017-11-28 | Stop reason: HOSPADM

## 2017-11-28 RX ORDER — PSEUDOEPHEDRINE HCL 30 MG
100 TABLET ORAL 2 TIMES DAILY
Qty: 60 CAPSULE | Refills: 0 | Status: SHIPPED | OUTPATIENT
Start: 2017-11-28 | End: 2017-12-28

## 2017-11-28 RX ORDER — MORPHINE SULFATE 2 MG/ML
2 INJECTION, SOLUTION INTRAMUSCULAR; INTRAVENOUS EVERY 10 MIN PRN
Status: DISCONTINUED | OUTPATIENT
Start: 2017-11-28 | End: 2017-11-28 | Stop reason: HOSPADM

## 2017-11-28 RX ORDER — HYDROMORPHONE HYDROCHLORIDE 1 MG/ML
0.2 INJECTION, SOLUTION INTRAMUSCULAR; INTRAVENOUS; SUBCUTANEOUS EVERY 5 MIN PRN
Status: DISCONTINUED | OUTPATIENT
Start: 2017-11-28 | End: 2017-11-28 | Stop reason: HOSPADM

## 2017-11-28 RX ORDER — ALBUTEROL SULFATE 2.5 MG/3ML
2.5 SOLUTION RESPIRATORY (INHALATION) ONCE
Status: COMPLETED | OUTPATIENT
Start: 2017-11-28 | End: 2017-11-28

## 2017-11-28 RX ORDER — MORPHINE SULFATE 4 MG/ML
4 INJECTION, SOLUTION INTRAMUSCULAR; INTRAVENOUS EVERY 10 MIN PRN
Status: DISCONTINUED | OUTPATIENT
Start: 2017-11-28 | End: 2017-11-28 | Stop reason: HOSPADM

## 2017-11-28 RX ORDER — POLYETHYLENE GLYCOL 3350 17 G/17G
17 POWDER, FOR SOLUTION ORAL 2 TIMES DAILY
Qty: 1020 G | Refills: 0 | Status: SHIPPED | OUTPATIENT
Start: 2017-11-28 | End: 2017-12-28

## 2017-11-28 RX ORDER — MORPHINE SULFATE 10 MG/ML
6 INJECTION, SOLUTION INTRAMUSCULAR; INTRAVENOUS EVERY 10 MIN PRN
Status: DISCONTINUED | OUTPATIENT
Start: 2017-11-28 | End: 2017-11-28 | Stop reason: HOSPADM

## 2017-11-28 RX ORDER — ONDANSETRON 2 MG/ML
4 INJECTION INTRAMUSCULAR; INTRAVENOUS ONCE AS NEEDED
Status: DISCONTINUED | OUTPATIENT
Start: 2017-11-28 | End: 2017-11-28 | Stop reason: HOSPADM

## 2017-11-28 RX ADMIN — GLYCOPYRROLATE 0.2 MG: 0.2 INJECTION INTRAMUSCULAR; INTRAVENOUS at 16:50:00

## 2017-11-28 RX ADMIN — SODIUM CHLORIDE, SODIUM LACTATE, POTASSIUM CHLORIDE, CALCIUM CHLORIDE: 600; 310; 30; 20 INJECTION, SOLUTION INTRAVENOUS at 16:45:00

## 2017-11-28 RX ADMIN — ONDANSETRON 4 MG: 2 INJECTION INTRAMUSCULAR; INTRAVENOUS at 16:50:00

## 2017-11-28 NOTE — PROGRESS NOTES
[facility]    Progress Note    Serafindaja Emmanuel Patient Status:  Inpatient    1945 MRN [id number]   Location [facility] 5SW/SE Attending Gallito Gardner MD   Hosp Day # 8 PCP Moody Jenkins MD     Interval History:   Not in distress 8.5  9.0   --   7.2   PLT  231  243   --   249     Recent Labs   Lab  11/26/17   0447  11/27/17   0425  11/28/17   0519   GLU  158*  137*  137*   BUN  36*  38*  35*   CREATSERUM  1.09  1.03  0.99   GFRAA  59*  >60  >60   GFRNAA  49*  52*  55*   CA  8.7  8.

## 2017-11-28 NOTE — PROGRESS NOTES
[facility]    Cardiology Progress Note    Jessiac William Patient Status:  Inpatient    1945 MRN [id number]   Location [facility] 5SW/SE Attending Leonides Booker MD   Hosp Day # 8 PCP Esteban Garcia MD     Patient Active Problem L 60 mg Oral Daily   • glimepiride  2 mg Oral Daily with breakfast   • AQUAPHOR   Topical BID   • isosorbide dinitrate  10 mg Oral TID (Nitrates)   • PEG 3350  17 g Oral BID   • docusate sodium  100 mg Oral BID   • hydrALAzine HCl  75 mg Oral Q8H [address]   • Edison

## 2017-11-28 NOTE — CM/SW NOTE
SW was informed that pt is requesting rehab at [facility]. SW contacted [facility] for referral. Due to pt's bariatric status, may have to discuss SNF w/ bariatric accomodations. 4:00PM: KANA was informed that [facility] is able to accept pt.  C aware that pt is in a b

## 2017-11-28 NOTE — PROGRESS NOTES
Spoke with Dr. Bassam Marshall this morning. Pt ready for rehab but required 2units PRBCs yesterday due to dropping Hgb, likely from persistent vaginal bleeding despite daily PO Provera. We discussed adding the pt on for a HSC/D&C later today.     I discussed the

## 2017-11-28 NOTE — PLAN OF CARE
Problem: Diabetes/Glucose Control  Goal: Glucose maintained within prescribed range  INTERVENTIONS:  - Monitor Blood Glucose as ordered  - Assess for signs and symptoms of hyperglycemia and hypoglycemia  - Administer ordered medications to maintain glucose precautions (e.g. spinal or hip dislocation precautions)   Outcome: Progressing  Left leg in cast    Problem: RESPIRATORY - ADULT  Goal: Achieves optimal ventilation and oxygenation  INTERVENTIONS:  - Assess for changes in respiratory status  - Assess for

## 2017-11-28 NOTE — PLAN OF CARE
Patient/Family Goals    • Patient/Family Long Term Goal Not Progressing    • Patient/Family Short Term Goal Not Progressing          CARDIOVASCULAR - ADULT    • Maintains optimal cardiac output and hemodynamic stability Progressing    • Absence of cardiac

## 2017-11-28 NOTE — ANESTHESIA PREPROCEDURE EVALUATION
Anesthesia PreOp Note    HPI:     Lurdes Mackey is a 74year old female who presents for preoperative consultation requested by: Matilde Perez MD    Date of Surgery: 11/20/2017 - 11/28/2017    Procedure(s):  D & C  Indication: Post-menopausal [T95. Facility-Administered Medications Ordered in Epic:  propofol (DIPRIVAN) injection  Intravenous PRN Adonay Stapleton  mg at 11/28/17 1650   ondansetron HCl (ZOFRAN) injection  Intravenous PRN Adonay Stapleton MD 4 mg at 11/28/17 1650   glycopy 650 mg Oral Q6H PRN Nazia Nichole  mg at 11/25/17 1014   [MAR Hold] dextrose 50% injection 50 mL 50 mL Intravenous PRN Nazia Nichole MD 50 mL at 11/22/17 2240   [MAR Hold] Glucose-Vitamin C (DEX-4) 4-0.006 g chewable tab 4 tablet 4 tablet Oral Q15 mouth 2 (two) times daily. Disp: 1020 g Rfl: 0   ferrous sulfate 325 (65 FE) MG Oral Tab EC Take 2 tablets (650 mg total) by mouth 2 (two) times daily with meals. Disp: 120 tablet Rfl: 0       No Known Allergies    No family history on file.     Social Hist Mallampati: II  TM distance: >3 FB  Neck ROM: limited  Dental    (+) upper dentures, lower dentures and partials    Pulmonary    (+) shortness of breath, sleep apnea, decreased breath sounds,   Cardiovascular   (+) hypertension, CHF, peripheral edema

## 2017-11-29 VITALS
WEIGHT: 293 LBS | BODY MASS INDEX: 51.91 KG/M2 | OXYGEN SATURATION: 95 % | SYSTOLIC BLOOD PRESSURE: 136 MMHG | HEART RATE: 80 BPM | DIASTOLIC BLOOD PRESSURE: 53 MMHG | RESPIRATION RATE: 20 BRPM | HEIGHT: 63 IN | TEMPERATURE: 100 F

## 2017-11-29 PROCEDURE — 3E0234Z INTRODUCTION OF SERUM, TOXOID AND VACCINE INTO MUSCLE, PERCUTANEOUS APPROACH: ICD-10-PCS | Performed by: INTERNAL MEDICINE

## 2017-11-29 RX ORDER — CYCLOBENZAPRINE HCL 10 MG
10 TABLET ORAL EVERY 8 HOURS PRN
Qty: 1 TABLET | Refills: 1 | Status: ON HOLD | OUTPATIENT
Start: 2017-11-29 | End: 2018-02-08

## 2017-11-29 RX ORDER — MEDROXYPROGESTERONE ACETATE 10 MG/1
10 TABLET ORAL DAILY
Qty: 30 TABLET | Refills: 0 | Status: SHIPPED | OUTPATIENT
Start: 2017-11-30 | End: 2017-12-03

## 2017-11-29 RX ORDER — CYCLOBENZAPRINE HCL 10 MG
10 TABLET ORAL EVERY 8 HOURS PRN
Status: DISCONTINUED | OUTPATIENT
Start: 2017-11-29 | End: 2017-11-29

## 2017-11-29 NOTE — OPERATIVE REPORT
[facility]    PATIENT'S NAME: Damaris Romy   ATTENDING PHYSICIAN: Juancho Peters MD   OPERATING PHYSICIAN: Jose Rafael Medina.  MD Taylor   PATIENT ACCOUNT#:   [id number]    LOCATION:  [address] [place] [address] #:   [id number]       DATE OF BIRTH: A small curette was used and curettage of the cavity performed yielding a moderate amount of curettings.   Curettage was difficult because of the depth of the uterus and inability to exert leverage on the curette which was just too short for this size patie

## 2017-11-29 NOTE — ANESTHESIA POSTPROCEDURE EVALUATION
Patient: Courtney Arizmendi    Procedure Summary     Date:  11/28/17 Room / Location:  [address] [place] [address] MAIN OR 06 / [address] [place] [address] MAIN OR    Anesthesia Start:  1644 Anesthesia Stop:      Procedure:  D & C (N/A ) Diagnosis:       Post-menopausal      (Post-menopausal [Z78.0])    Mariel Benton

## 2017-11-29 NOTE — CM/SW NOTE
SW was informed that pt is able to d/c today    KANA informed [facility]; agreeable w/ 430p d/c time  KANA informed RN of d/c time; agreeable  KANA met w/ pt; agreeable w/ d/c and provided IM letter  KANA left  for dtr/Celeste ROGER contacted [facility] for ambulance (O2)

## 2017-11-29 NOTE — PLAN OF CARE
Problem: Diabetes/Glucose Control  Goal: Glucose maintained within prescribed range  INTERVENTIONS:  - Monitor Blood Glucose as ordered  - Assess for signs and symptoms of hyperglycemia and hypoglycemia  - Administer ordered medications to maintain glucose effectiveness of antiarrhythmic and heart rate control medications as ordered  - Initiate emergency measures for life threatening arrhythmias  - Monitor electrolytes and administer replacement therapy as ordered   Outcome: Adequate for Discharge      Probl intact  INTERVENTIONS  - Assess and document risk factors for pressure ulcer development  - Assess and document skin integrity  - Monitor for areas of redness and/or skin breakdown  - Initiate interventions, skin care algorithm/standards of care as needed

## 2017-11-29 NOTE — BRIEF OP NOTE
Pre-Operative Diagnosis: Post-menopausal bleeding  Post-Operative Diagnosis: same  Procedure Performed:  Hysteroscopy, dilation and curretage    Surgeon(s) and Role:     * Nate Vazquez MD - Primary    Assistant(s):   none     Surgical Finding

## 2017-11-29 NOTE — ANESTHESIA POSTPROCEDURE EVALUATION
Patient: Taco Torres    Procedure Summary     Date:  11/28/17 Room / Location:  [address] [place] [address] MAIN OR 06 / [address] [place] [address] OR    Anesthesia Start:  1644 Anesthesia Stop:      Procedure:  D & C (N/A ) Diagnosis:       Post-menopausal      (Post-menopausal [Z78.0])    Asael Perez

## 2017-11-29 NOTE — PROGRESS NOTES
[facility] HOSP - [facility]    Cardiology Progress Note    Lexi Border Patient Status:  Inpatient    1945 MRN [id number]   Location [facility] 5SW/SE Attending Diana Casanova MD   Hosp Day # 9 PCP Simba Purcell MD     Patient Active Problem L Miconazole Nitrate   Topical [email or web address]   • AmLODIPine Besylate  5 mg Oral BID   • Insulin Aspart Pen  1-7 Units Subcutaneous TID CC   • atorvastatin  40 mg Oral Nightly   • ferrous sulfate  650 mg Oral BID with meals   • MedroxyPROGESTERone Acetate  10

## 2017-11-29 NOTE — PROGRESS NOTES
[facility]    Progress Note    Ariel James Patient Status:  Inpatient    1945 MRN [id number]   Location [facility] 5SW/SE Attending Anamaria Mccarty MD   Hosp Day # 9 PCP Alton Sevilla MD       SUBJECTIVE:  Pt states that she mg 100 mg Oral BID   hydrALAzine HCl (APRESOLINE) tab 75 mg 75 mg Oral Q8H [address]   Normal Saline Flush 0.9 % injection 10 mL 10 mL Intravenous PRN   ondansetron HCl (ZOFRAN) injection 4 mg 4 mg Intravenous Q6H PRN   Miconazole Nitrate 2 % powder  Topical BID rosalie SAENZ  11/29/2017  12:26 PM

## 2017-11-29 NOTE — ANESTHESIA POSTPROCEDURE EVALUATION
Patient: Clarence Starch    Procedure Summary     Date:  11/28/17 Room / Location:  [address] [place] [address] MAIN OR 06 / [address] [place] [address] MAIN OR    Anesthesia Start:  1644 Anesthesia Stop:      Procedure:  D & C (N/A ) Diagnosis:       Post-menopausal      (Post-menopausal [Z78.0])    Mason Sprain

## 2017-11-30 ENCOUNTER — SNF VISIT (OUTPATIENT)
Dept: INTERNAL MEDICINE CLINIC | Facility: SKILLED NURSING FACILITY | Age: 72
End: 2017-11-30

## 2017-11-30 ENCOUNTER — HOSPITAL ENCOUNTER (INPATIENT)
Facility: HOSPITAL | Age: 72
LOS: 3 days | Discharge: NURSING, REHAB OR LONG-TERM CARE | DRG: 812 | End: 2017-12-03
Attending: EMERGENCY MEDICINE | Admitting: INTERNAL MEDICINE
Payer: MEDICARE

## 2017-11-30 DIAGNOSIS — D50.0 IRON DEFICIENCY ANEMIA DUE TO CHRONIC BLOOD LOSS: Chronic | ICD-10-CM

## 2017-11-30 DIAGNOSIS — N93.9 VAGINAL BLEEDING: ICD-10-CM

## 2017-11-30 DIAGNOSIS — I10 ESSENTIAL HYPERTENSION: ICD-10-CM

## 2017-11-30 DIAGNOSIS — E11.9 TYPE 2 DIABETES MELLITUS WITHOUT COMPLICATION, UNSPECIFIED LONG TERM INSULIN USE STATUS: Chronic | ICD-10-CM

## 2017-11-30 DIAGNOSIS — S92.902D CLOSED FRACTURE OF LEFT FOOT WITH ROUTINE HEALING, SUBSEQUENT ENCOUNTER: ICD-10-CM

## 2017-11-30 DIAGNOSIS — E87.5 HYPERKALEMIA: ICD-10-CM

## 2017-11-30 DIAGNOSIS — D64.9 ANEMIA, UNSPECIFIED TYPE: Primary | ICD-10-CM

## 2017-11-30 DIAGNOSIS — S92.345A CLOSED NONDISPLACED FRACTURE OF FOURTH METATARSAL BONE OF LEFT FOOT, INITIAL ENCOUNTER: ICD-10-CM

## 2017-11-30 DIAGNOSIS — I27.20 PULMONARY HYPERTENSION, MODERATE TO SEVERE (HCC): ICD-10-CM

## 2017-11-30 PROCEDURE — 86900 BLOOD TYPING SEROLOGIC ABO: CPT | Performed by: EMERGENCY MEDICINE

## 2017-11-30 PROCEDURE — 99285 EMERGENCY DEPT VISIT HI MDM: CPT

## 2017-11-30 PROCEDURE — 87641 MR-STAPH DNA AMP PROBE: CPT | Performed by: EMERGENCY MEDICINE

## 2017-11-30 PROCEDURE — 80076 HEPATIC FUNCTION PANEL: CPT | Performed by: EMERGENCY MEDICINE

## 2017-11-30 PROCEDURE — 86920 COMPATIBILITY TEST SPIN: CPT

## 2017-11-30 PROCEDURE — 82962 GLUCOSE BLOOD TEST: CPT

## 2017-11-30 PROCEDURE — 80048 BASIC METABOLIC PNL TOTAL CA: CPT | Performed by: EMERGENCY MEDICINE

## 2017-11-30 PROCEDURE — 36415 COLL VENOUS BLD VENIPUNCTURE: CPT

## 2017-11-30 PROCEDURE — 85025 COMPLETE CBC W/AUTO DIFF WBC: CPT | Performed by: EMERGENCY MEDICINE

## 2017-11-30 PROCEDURE — 30233N1 TRANSFUSION OF NONAUTOLOGOUS RED BLOOD CELLS INTO PERIPHERAL VEIN, PERCUTANEOUS APPROACH: ICD-10-PCS | Performed by: INTERNAL MEDICINE

## 2017-11-30 PROCEDURE — 86850 RBC ANTIBODY SCREEN: CPT | Performed by: EMERGENCY MEDICINE

## 2017-11-30 PROCEDURE — 81001 URINALYSIS AUTO W/SCOPE: CPT | Performed by: EMERGENCY MEDICINE

## 2017-11-30 PROCEDURE — 99310 SBSQ NF CARE HIGH MDM 45: CPT | Performed by: CLINICAL NURSE SPECIALIST

## 2017-11-30 PROCEDURE — 86901 BLOOD TYPING SEROLOGIC RH(D): CPT | Performed by: EMERGENCY MEDICINE

## 2017-11-30 RX ORDER — SODIUM CHLORIDE 9 MG/ML
INJECTION, SOLUTION INTRAVENOUS
Status: DISPENSED
Start: 2017-11-30 | End: 2017-12-01

## 2017-11-30 RX ORDER — DOCUSATE SODIUM 100 MG/1
100 CAPSULE, LIQUID FILLED ORAL 2 TIMES DAILY
Status: DISCONTINUED | OUTPATIENT
Start: 2017-11-30 | End: 2017-12-03

## 2017-11-30 RX ORDER — MEGESTROL ACETATE 40 MG/1
80 TABLET ORAL 2 TIMES DAILY
Status: DISCONTINUED | OUTPATIENT
Start: 2017-11-30 | End: 2017-12-03

## 2017-11-30 RX ORDER — MELATONIN
650 2 TIMES DAILY WITH MEALS
Status: DISCONTINUED | OUTPATIENT
Start: 2017-11-30 | End: 2017-12-03

## 2017-11-30 RX ORDER — MEDROXYPROGESTERONE ACETATE 5 MG/1
10 TABLET ORAL DAILY
Status: DISCONTINUED | OUTPATIENT
Start: 2017-11-30 | End: 2017-11-30

## 2017-11-30 RX ORDER — SODIUM CHLORIDE 9 MG/ML
INJECTION, SOLUTION INTRAVENOUS ONCE
Status: COMPLETED | OUTPATIENT
Start: 2017-11-30 | End: 2017-11-30

## 2017-11-30 RX ORDER — GLIMEPIRIDE 4 MG/1
2 TABLET ORAL
Status: DISCONTINUED | OUTPATIENT
Start: 2017-12-01 | End: 2017-12-03

## 2017-11-30 RX ORDER — ACETAMINOPHEN 325 MG/1
650 TABLET ORAL EVERY 6 HOURS PRN
Status: DISCONTINUED | OUTPATIENT
Start: 2017-11-30 | End: 2017-12-03

## 2017-11-30 RX ORDER — SODIUM CHLORIDE 0.9 % (FLUSH) 0.9 %
10 SYRINGE (ML) INJECTION AS NEEDED
Status: DISCONTINUED | OUTPATIENT
Start: 2017-11-30 | End: 2017-12-03

## 2017-11-30 RX ORDER — ISOSORBIDE DINITRATE 10 MG/1
10 TABLET ORAL
Status: DISCONTINUED | OUTPATIENT
Start: 2017-11-30 | End: 2017-12-03

## 2017-11-30 RX ORDER — AMLODIPINE BESYLATE 5 MG/1
5 TABLET ORAL 2 TIMES DAILY
Status: DISCONTINUED | OUTPATIENT
Start: 2017-11-30 | End: 2017-12-03

## 2017-11-30 RX ORDER — ATORVASTATIN CALCIUM 40 MG/1
40 TABLET, FILM COATED ORAL NIGHTLY
Status: DISCONTINUED | OUTPATIENT
Start: 2017-12-01 | End: 2017-12-03

## 2017-11-30 RX ORDER — POLYETHYLENE GLYCOL 3350 17 G/17G
17 POWDER, FOR SOLUTION ORAL 2 TIMES DAILY
Status: DISCONTINUED | OUTPATIENT
Start: 2017-11-30 | End: 2017-12-03

## 2017-11-30 RX ORDER — FUROSEMIDE 10 MG/ML
20 INJECTION INTRAMUSCULAR; INTRAVENOUS ONCE
Status: COMPLETED | OUTPATIENT
Start: 2017-11-30 | End: 2017-11-30

## 2017-11-30 NOTE — PROGRESS NOTES
HPI: Bon Davis  : 1945  Age 74year old  female patient is admitted to [facility] for [facility]    Chief complaint: Initial APN assessment, vaginal bleeding, HTN, L foot fracture, Respiratory insufficiency, CHF, DM, UTI, indwelling sitting. Physical exam limited due to body habitus w/ visible small amounts of blood around perineum. Indwelling olivas catheter with yellow urine, no blood. Pt denies any abdominal pain or discomfort. Abdomen soft, non tender to palpation.   Not clear wh urination; denies unexpected wt gain or wt loss  SKIN: denies any unusual skin lesions or rashes  ALLERGY/IMM.: denies food or seasonal allergies      PHYSICAL EXAM:  GENERAL HEALTH: well developed, well nourished, in no apparent distress, morbidly obese, no added salt    3.  Hypercapnic respiratory failure  - likely due to ASHLEY per pulmonology  - seen by Dr. Ronnie Jarquin inpatient w/ recs for outpatient polysomnography   - continue supplemental Oxygen as needed currently on 1L NC at night  - was on CPAP in hospit

## 2017-11-30 NOTE — ED PROVIDER NOTES
Patient Seen in: [facility] AND [facility] Emergency Department     History   Patient presents with:  Postop/Procedure Problem: vaginal bleeding s/p D&C on 11/28/17  hx; post menopausal     Stated Complaint: Bleeding    HPI    74year old female with morbid obesit daily.   PEG 3350 Oral Powd Pack,  Take 17 g by mouth 2 (two) times daily. ferrous sulfate 325 (65 FE) MG Oral Tab EC,  Take 2 tablets (650 mg total) by mouth 2 (two) times daily with meals. atorvastatin 40 MG Oral Tab,  Take 40 mg by mouth daily. There is bleeding (Mild, no ongoing severe hemorrhage) in the vagina. Musculoskeletal: Normal range of motion. She exhibits no edema. Neurological: She is alert and oriented to person, place, and time. She displays no seizure activity.    Skin: Skin is of history:   able to obtain history from patient  Factors limiting our ability to obtain a history:  None    Complicating Factors: The patient already has has Essential hypertension; Type 2 diabetes mellitus without complication ([address]  Iron deficiency ane Further Inpatient evaluation and treatment will be required.  I personally discussed the results of the above ED workup and a number of associated acute management issues with the patient, and I explained the need for further follow-up evaluation and treatm

## 2017-11-30 NOTE — PROGRESS NOTES
HPI: Maria T Carcamo  : 1945  Age 74year old  female patient is admitted to [facility]    Chief complaint: Initial APN assessment, vaginal bleeding, HTN, L foot fracture, Respiratory insufficiency, CHF, DM, UTI, indwelling sitting. Physical exam limited due to body habitus w/ visible small amounts of blood around perineum. Indwelling olivas catheter with yellow urine, no blood. Pt denies any abdominal pain or discomfort. Abdomen soft, non tender to palpation.   Not clear wh urination; denies unexpected wt gain or wt loss  SKIN: denies any unusual skin lesions or rashes  ALLERGY/IMM.: denies food or seasonal allergies      PHYSICAL EXAM:  GENERAL HEALTH: well developed, well nourished, in no apparent distress, morbidly obese, w/ no added salt    3.  Hypercapnic respiratory failure  - likely due to ASHLEY per pulmonology  - seen by Dr. Miley Cintron inpatient w/ recs for outpatient polysomnography   - continue supplemental Oxygen as needed currently on 1L NC at night  - was on CPAP in hos

## 2017-11-30 NOTE — ED INITIAL ASSESSMENT (HPI)
Pt had D&C here on 11/28 fir DUB. Vag bleed this Am. Soaked diaper and chux at [facility] per medics. Denies weakness/dizziness. Cramping slightly when she sits up.

## 2017-12-01 PROBLEM — S92.345D CLOSED NONDISPLACED FRACTURE OF FOURTH METATARSAL BONE OF LEFT FOOT WITH ROUTINE HEALING: Chronic | Status: ACTIVE | Noted: 2017-11-20

## 2017-12-01 PROCEDURE — 97162 PT EVAL MOD COMPLEX 30 MIN: CPT

## 2017-12-01 PROCEDURE — 94660 CPAP INITIATION&MGMT: CPT

## 2017-12-01 PROCEDURE — 82962 GLUCOSE BLOOD TEST: CPT

## 2017-12-01 PROCEDURE — 85025 COMPLETE CBC W/AUTO DIFF WBC: CPT | Performed by: INTERNAL MEDICINE

## 2017-12-01 PROCEDURE — 36430 TRANSFUSION BLD/BLD COMPNT: CPT

## 2017-12-01 PROCEDURE — 97166 OT EVAL MOD COMPLEX 45 MIN: CPT

## 2017-12-01 PROCEDURE — 80048 BASIC METABOLIC PNL TOTAL CA: CPT | Performed by: INTERNAL MEDICINE

## 2017-12-01 PROCEDURE — 99211 OFF/OP EST MAY X REQ PHY/QHP: CPT

## 2017-12-01 PROCEDURE — 83036 HEMOGLOBIN GLYCOSYLATED A1C: CPT | Performed by: INTERNAL MEDICINE

## 2017-12-01 RX ORDER — DEXTROSE MONOHYDRATE 25 G/50ML
50 INJECTION, SOLUTION INTRAVENOUS AS NEEDED
Status: DISCONTINUED | OUTPATIENT
Start: 2017-12-01 | End: 2017-12-03

## 2017-12-01 RX ORDER — SODIUM CHLORIDE 9 MG/ML
INJECTION, SOLUTION INTRAVENOUS
Status: COMPLETED
Start: 2017-12-01 | End: 2017-12-01

## 2017-12-01 NOTE — PHYSICAL THERAPY NOTE
PHYSICAL THERAPY EVALUATION - INPATIENT     Room Number: 464/464-A  Evaluation Date: 12/1/2017  Type of Evaluation: Initial  Physician Order: PT Eval and Treat    Presenting Problem: vaginal bleeding ---pt with newly diagnosis of uternine CA --pt with Patient's current functional deficits include  Need for 2 person max assisted bed mobility supine to from sit and rolling in bed . For scoot up in bed max assist of 2 with [place] bed position used.      Pt once sitting able to support self sitting PT Treatment Plan: Bed mobility; Endurance; Energy conservation;Patient education;Range of motion;Transfer training;Balance training  Rehab Potential : Fair  Frequency (Obs):  (3-5 x wk as approp)       PHYSICAL THERAPY MEDICAL/SOCIAL HISTORY     History rel Past Medical History  Past Medical History:   Diagnosis Date   • Back pain    • Diabetes [facility])    • Essential hypertension    • Hyperlipidemia        Past Surgical History  Past Surgical History:  11/28/2017: DILATION/CURETTAGE,DIAGNOSTIC  No date: KNEE RE ACTIVITY TOLERANCE  Resting BP  127/42  HR  82  O2 sats 92 %  1  1/2 L   Sitting EOB  BP  154/70  HR  83  O2 sats 94 %     AM-PAC '6-Clicks' INPATIENT SHORT FORM - BASIC MOBILITY  How much difficulty does the patient currently have. ..  -   Turning over i Goal #2 As pt progresses:  Consider   May attempt slideboard transfer bed to from  w/c as approp ( bed height may be a problem )  and safe for pt ---pt with a NON WB Status on left LE          Goal #2  Current Status    Goal #3 Pt able to sit at EOB for 15

## 2017-12-01 NOTE — PROGRESS NOTES
74year old  Admitted for evaluation of Anemia, unspecified type . Condition is stable.  . For details see H & P.

## 2017-12-01 NOTE — PROGRESS NOTES
GYNE:  Disc with pt and her daughter the dx of Gr 1 endometrial cancer. Received 2units PRBC, Hb up to 9.3. Started on Megace 80mg BID. RN reports that bleeding is like a light period. Pt not having pain.   Has good appetite, had BM, has Salas (due to b

## 2017-12-01 NOTE — PLAN OF CARE
Diabetes/Glucose Control    • Glucose maintained within prescribed range Progressing        PAIN - ADULT    • Verbalizes/displays adequate comfort level or patient's stated pain goal Progressing        Patient/Family Goals    • Patient/Family [facility]

## 2017-12-01 NOTE — H&P
[address] Patient Status:  Inpatient    1945 MRN [id number]   Location [facility] 4W/SW/SE Attending Anamaria Mccarty MD   Hosp Day # 1 PCP Alton Sevilla MD     Date:  2017  Date of Ad Tab Take 3 tablets (75 mg total) by mouth every 8 (eight) hours. Disp: 270 tablet Rfl: 0    AmLODIPine Besylate 5 MG Oral Tab Take 1 tablet (5 mg total) by mouth 2 (two) times daily.  Disp: 60 tablet Rfl: 0    AQUAPHOR External Ointment Apply twice a day Yeny Devi 28.5 12/01/2017    12/01/2017   CREATSERUM 1.01 12/01/2017   BUN 34 12/01/2017    12/01/2017   K 4.1 12/01/2017   CL 90 12/01/2017   CO2 37 12/01/2017    12/01/2017   CA 8.6 12/01/2017   ALB 2.4 11/30/2017   ALKPHO 78 11/30/2017   BILT

## 2017-12-01 NOTE — OCCUPATIONAL THERAPY NOTE
OCCUPATIONAL THERAPY EVALUATION - INPATIENT     Room Number: 464/464-A  Evaluation Date: 12/1/2017  Type of Evaluation: Initial  Presenting Problem: Left 4th metatarsal fx, vaginal bleeding    Physician Order: IP Consult to Occupational Therapy  Reason for without complication (HCC)    Iron deficiency anemia due to chronic blood loss    Closed nondisplaced fracture of fourth metatarsal bone of left foot with routine healing    Anemia      Past Medical History  Past Medical History:   Diagnosis Date   • Back much help from another person does the patient currently need…  -   Putting on and taking off regular lower body clothing?: A Lot  -   Bathing (including washing, rinsing, drying)?: A Lot  -   Toileting, which includes using toilet, bedpan or urinal? : A L

## 2017-12-01 NOTE — PLAN OF CARE
Received for ED. Salas in place. Vaginal bleeding noted. Salas in place. PRBC started. Blood glucose wdl.

## 2017-12-01 NOTE — WOUND PROGRESS NOTE
WOUND CARE NOTE      PLAN   Recommendations:  Turn schedules  Heels elevated using pillows, heel wedge or heel boots to offload heels    Wound(s)  Location: L anterior lower leg  Cleansing  Saline   Dressings Xeroform  Secure with 4x4 foam  Frequency q 2

## 2017-12-01 NOTE — PROGRESS NOTES
GYN Note:  Notified this afternoon by Pathology that pt has Grade 1 Endometrial Carcinoma. Have not yet talked to pt. Rec stop Provera and start Megace 80mg BID, ordered, may help her bleeding. Discussed case with GYN-ONC (Mars).   He would see her in

## 2017-12-02 PROCEDURE — 94660 CPAP INITIATION&MGMT: CPT

## 2017-12-02 PROCEDURE — 82962 GLUCOSE BLOOD TEST: CPT

## 2017-12-02 PROCEDURE — 83735 ASSAY OF MAGNESIUM: CPT | Performed by: INTERNAL MEDICINE

## 2017-12-02 PROCEDURE — 80048 BASIC METABOLIC PNL TOTAL CA: CPT | Performed by: INTERNAL MEDICINE

## 2017-12-02 PROCEDURE — 85025 COMPLETE CBC W/AUTO DIFF WBC: CPT | Performed by: INTERNAL MEDICINE

## 2017-12-02 NOTE — PLAN OF CARE
Diabetes/Glucose Control    • Glucose maintained within prescribed range Progressing        PAIN - ADULT    • Verbalizes/displays adequate comfort level or patient's stated pain goal Progressing        Patient/Family Goals    • Patient/Family [facility]

## 2017-12-02 NOTE — PROGRESS NOTES
Julia   [id number]  December 2, 2017  12:25 PM      GYN Progress Note    Subjective: no complaints  Objective:  /48 (BP Location: Right arm)   Pulse 80   Temp 98.4 °F (36.9 °C) (Oral)   Resp 20   Wt (!) 357 lb (161.9 kg)   SpO2 96%   BMI 63.24 kg

## 2017-12-02 NOTE — PROGRESS NOTES
[facility]    Progress Note    Yaniar Vasquez Patient Status:  Inpatient    1945 MRN [id number]   Location [facility] 4W/SW/SE Attending Sandra Hairston MD   Hosp Day # 2 PCP Crow Diaz MD       SUBJECTIVE:  Feeling about th atorvastatin (LIPITOR) tab 40 mg 40 mg Oral Nightly   docusate sodium (COLACE) cap 100 mg 100 mg Oral BID   ferrous sulfate EC tab 325 mg 650 mg Oral BID with meals   furosemide (LASIX) tab 60 mg 60 mg Oral Daily   glimepiride (AMARYL) tab 2 mg 2 mg Oral

## 2017-12-02 NOTE — PLAN OF CARE
Diabetes/Glucose Control    • Glucose maintained within prescribed range Progressing        PAIN - ADULT    • Verbalizes/displays adequate comfort level or patient's stated pain goal Progressing        Patient/Family Goals    • Patient/Family [facility]

## 2017-12-03 VITALS
SYSTOLIC BLOOD PRESSURE: 146 MMHG | HEART RATE: 72 BPM | TEMPERATURE: 99 F | OXYGEN SATURATION: 92 % | WEIGHT: 293 LBS | BODY MASS INDEX: 63 KG/M2 | RESPIRATION RATE: 18 BRPM | DIASTOLIC BLOOD PRESSURE: 52 MMHG

## 2017-12-03 PROCEDURE — 94660 CPAP INITIATION&MGMT: CPT

## 2017-12-03 PROCEDURE — 85025 COMPLETE CBC W/AUTO DIFF WBC: CPT | Performed by: INTERNAL MEDICINE

## 2017-12-03 PROCEDURE — 82962 GLUCOSE BLOOD TEST: CPT

## 2017-12-03 PROCEDURE — 80048 BASIC METABOLIC PNL TOTAL CA: CPT | Performed by: INTERNAL MEDICINE

## 2017-12-03 RX ORDER — MEGESTROL ACETATE 40 MG/1
80 TABLET ORAL 4 TIMES DAILY
Status: DISCONTINUED | OUTPATIENT
Start: 2017-12-03 | End: 2017-12-03

## 2017-12-03 RX ORDER — MEGESTROL ACETATE 40 MG/1
80 TABLET ORAL 4 TIMES DAILY
Qty: 320 TABLET | Refills: 0 | Status: SHIPPED | OUTPATIENT
Start: 2017-12-03 | End: 2018-03-26

## 2017-12-03 NOTE — PROGRESS NOTES
[facility]    Progress Note    Jose Luis Esquivel Patient Status:  Inpatient    1945 MRN [id number]   Location [facility] 4W/SW/SE Attending Maggy North MD   Hosp Day # 3 PCP Josey Crockett MD       SUBJECTIVE:  Pt complains of BID   AQUAPHOR (AQUAPHOR) ointment  Topical BID PRN   atorvastatin (LIPITOR) tab 40 mg 40 mg Oral Nightly   docusate sodium (COLACE) cap 100 mg 100 mg Oral BID   ferrous sulfate EC tab 325 mg 650 mg Oral BID with meals   furosemide (LASIX) tab 60 mg 60 mg

## 2017-12-03 NOTE — PROGRESS NOTES
Spoke with Dr. Alpha Alpers. After seeing the patient, he recommends increasing the dose of Megace to 320mg daily (will administer 80mg QID).   He agrees the pt is OK for d/c to rehab andl will plan to f/u at [address] [place] [address] for a Hysteroscopy with Mirena placement in a

## 2017-12-03 NOTE — CM/SW NOTE
KANA was informed that pt is able to d/c back to [facility] today    KANA confirmed w/ pt/dtr that pt is from [facility] and plan is to return back. KANA informed d/c time will be 3p via ambulance; agreeable. KANA informed RN of d/c time.   KANA informed BVC; agreeable w/ d/c cristobal

## 2017-12-03 NOTE — PROGRESS NOTES
Perdo Jordan  [id number]  December 3, 2017  9:42 AM      GYN Progress Note    Subjective: no complaints  Objective:  /52 (BP Location: Right arm)   Pulse 72   Temp 98.6 °F (37 °C) (Oral)   Resp 18   Wt (!) 357 lb (161.9 kg)   SpO2 92%   BMI 63.24 kg/m²

## 2017-12-03 NOTE — PLAN OF CARE
Diabetes/Glucose Control    • Glucose maintained within prescribed range Adequate for Discharge        PAIN - ADULT    • Verbalizes/displays adequate comfort level or patient's stated pain goal Adequate for Discharge        Patient Centered Care    • Naziae

## 2017-12-04 PROCEDURE — 99305 1ST NF CARE MODERATE MDM 35: CPT | Performed by: INTERNAL MEDICINE

## 2017-12-04 NOTE — PROGRESS NOTES
Asked to see pt in consut by Dr Ora Castrejon secondary to a G1 endometrial carcinoma dx on hysteroscopy 11/28/17  Was supposed to see in the office but continued with bleeding    Dallasjason Jordan is a(n) 74year old female with PMH of  Obesity, DM type 2, HTN ad docusate sodium 100 MG Oral Cap Take 100 mg by mouth 2 (two) times daily. Disp: 60 capsule Rfl: 0   PEG 3350 Oral Powd Pack Take 17 g by mouth 2 (two) times daily.  Disp: 1020 g Rfl: 0   ferrous sulfate 325 (65 FE) MG Oral Tab EC Take 2 tablets (650 mg to

## 2017-12-05 NOTE — DISCHARGE SUMMARY
[facility]    Discharge Summary    [facility] Patient Status:  Inpatient    1945 MRN [id number]   Location [facility] 4W/SW/SE Attending No att. providers found   Hosp Day # 3 PCP Merna Peng MD     Date of Admission: 11/3 Medicine              Discharge Plan:   Discharge Condition: Stable    Discharge Medication List as of 12/3/2017  2:05 PM    New Orders    Megestrol Acetate 40 MG Oral Tab  Take 2 tablets (80 mg total) by mouth 4 (four) times daily. , Print, Disp-320 tablet 40 mg by mouth daily. , Historical              Discharge Diet: Diabetic diet    Discharge Activity: Non-weight bearing    Follow up:      Follow-up Information     Jimenez Pedraza MD.    Specialty:  Gynecology  Oncology  Contact information:  [address]

## 2017-12-05 NOTE — DISCHARGE SUMMARY
[facility]    Discharge Summary    Ling Layne Patient Status:  Inpatient    1945 MRN [id number]   Location [facility] 5SW/SE Attending No att. providers found   [facility] Day # 9 PCP Sandeep Deleon MD     Date of Admission:  Lymphadenopathy:     She has no cervical adenopathy. Neurological: She is alert. No cranial nerve deficit. Skin: Skin is warm.    Psychiatric: Thought content normal.         Hospital Course: Admitted to the Hospital, evaluated by Cardiologist, Pulmon R-0    glimepiride 2 MG Oral Tab  Take 1 tablet (2 mg total) by mouth daily with breakfast., Print Script, Disp-30 tablet, R-0    Insulin Aspart Pen 100 UNIT/ML Subcutaneous Solution Pen-injector  Inject 1-7 Units into the skin 3 (three) times daily with m

## 2017-12-06 ENCOUNTER — SNF VISIT (OUTPATIENT)
Dept: INTERNAL MEDICINE CLINIC | Facility: SKILLED NURSING FACILITY | Age: 72
End: 2017-12-06

## 2017-12-06 DIAGNOSIS — N93.9 VAGINAL BLEEDING: ICD-10-CM

## 2017-12-06 DIAGNOSIS — I50.9 ACUTE CONGESTIVE HEART FAILURE, UNSPECIFIED CONGESTIVE HEART FAILURE TYPE: ICD-10-CM

## 2017-12-06 DIAGNOSIS — D64.9 ANEMIA, UNSPECIFIED TYPE: ICD-10-CM

## 2017-12-06 PROCEDURE — 99310 SBSQ NF CARE HIGH MDM 45: CPT | Performed by: NURSE PRACTITIONER

## 2017-12-06 NOTE — PROGRESS NOTES
HPI: Claire Krueger  : 1945  Age 74year old  female patient is admitted to [facility] for OWEN    Chief complaint: Initial APN assessment, vaginal bleeding,  Left foot fracture, Respiratory insufficiency, and CHF    Patient is a 7 any abdominal pain on assessment. Medium amount of blood noted in diaper, denies dizziness, no apprehension or lightheaded. Will review recent CBC.   NP informed by nurse that patient missed her appointment because the ambulance couldn't accommodate her on [place]  CARDIOVASCULAR: S1, S2, regular  ABDOMEN:  normal active BS+, soft, non distended, nontender   : Vaginal bleeding. Medium amount of blood noted in diaper.   MUSCULOSKELETAL: LLE splint in place  EXTREMITIES/VASCULAR:+2 edema RLE, unable to assess LLE Insulin Aspart ss  - Accu check AC TID  - Hgb A1c 5.1 12/1    8. Constipation  -CPM Miralax and Coladily Daily    9.  Hyperlipidemia  -CPM Atorvastatin Daily

## 2017-12-07 ENCOUNTER — HOSPITAL ENCOUNTER (EMERGENCY)
Facility: HOSPITAL | Age: 72
Discharge: HOME | End: 2017-12-07
Attending: EMERGENCY MEDICINE
Payer: MEDICARE

## 2017-12-07 ENCOUNTER — SNF VISIT (OUTPATIENT)
Dept: INTERNAL MEDICINE CLINIC | Facility: SKILLED NURSING FACILITY | Age: 72
End: 2017-12-07

## 2017-12-07 VITALS
TEMPERATURE: 98 F | OXYGEN SATURATION: 100 % | DIASTOLIC BLOOD PRESSURE: 58 MMHG | RESPIRATION RATE: 20 BRPM | SYSTOLIC BLOOD PRESSURE: 166 MMHG | HEART RATE: 74 BPM

## 2017-12-07 DIAGNOSIS — D50.0 BLOOD LOSS ANEMIA: ICD-10-CM

## 2017-12-07 DIAGNOSIS — D64.9 ANEMIA, UNSPECIFIED TYPE: Primary | ICD-10-CM

## 2017-12-07 DIAGNOSIS — N93.9 VAGINAL BLEEDING: ICD-10-CM

## 2017-12-07 DIAGNOSIS — J96.20 ACUTE ON CHRONIC RESPIRATORY FAILURE, UNSPECIFIED WHETHER WITH HYPOXIA OR HYPERCAPNIA (HCC): ICD-10-CM

## 2017-12-07 DIAGNOSIS — C54.1 ENDOMETRIAL CANCER (HCC): ICD-10-CM

## 2017-12-07 PROCEDURE — 36415 COLL VENOUS BLD VENIPUNCTURE: CPT

## 2017-12-07 PROCEDURE — 99310 SBSQ NF CARE HIGH MDM 45: CPT | Performed by: CLINICAL NURSE SPECIALIST

## 2017-12-07 PROCEDURE — 86850 RBC ANTIBODY SCREEN: CPT | Performed by: EMERGENCY MEDICINE

## 2017-12-07 PROCEDURE — 86901 BLOOD TYPING SEROLOGIC RH(D): CPT | Performed by: EMERGENCY MEDICINE

## 2017-12-07 PROCEDURE — 85025 COMPLETE CBC W/AUTO DIFF WBC: CPT | Performed by: EMERGENCY MEDICINE

## 2017-12-07 PROCEDURE — 80048 BASIC METABOLIC PNL TOTAL CA: CPT | Performed by: EMERGENCY MEDICINE

## 2017-12-07 PROCEDURE — 99283 EMERGENCY DEPT VISIT LOW MDM: CPT

## 2017-12-07 PROCEDURE — 86900 BLOOD TYPING SEROLOGIC ABO: CPT | Performed by: EMERGENCY MEDICINE

## 2017-12-07 NOTE — PROGRESS NOTES
Jimmy An : 1945 Age 74year old female patient is admitted to [facility] for OWEN   Chief complaint: F/u vaginal bleeding, Left foot fracture, Respiratory failure and CHF     Patient is a 75 yo female with a medical history o low.  Hemoglobin trending down, today 7.8, down from 9.3 on 12/3. Pt will require transfusion of PRBCs. Discussed case w/ Dr. Valery Noel. Called placed to Dr. Belkis Youngblood and per office staff care of this pt has been transferred to Dr. Luiz Soriano.   Will s nontender   : Vaginal bleeding. Medium amount of blood noted in diaper.    MUSCULOSKELETAL: LLE splint in place   EXTREMITIES/VASCULAR:+2 edema RLE, unable to assess LLE 2/2 splint w/ dressing   LINES, TUBES, DRAINS: Salas catheter   SKIN: warm, dry, pale Gyne/Onc, discussed with nurse. 6. Endometrial cancer   - s/p D&C on 11/28   - Uterine biopsy suggestive of Grade 1 endometrial cancer   - will monitor labs   - Staff to schedule appt with Dr. Mark Viramontes, discussed with nurse.  ? hysteroscopy in few

## 2017-12-07 NOTE — ED PROVIDER NOTES
Patient Seen in: [facility] AND [facility] Emergency Department    History   Patient presents with:  Abnormal Result (metabolic, cardiac)    Stated Complaint: sent from [facility] for low hgb.     HPI    The patient is a 22-year-old female with past history of hyp bilaterally. There is no posterior pharyngeal erythema  Chest: Clear to auscultation, no tenderness  Cardiovascular: Regular rate and rhythm without murmur  Abdomen: Soft, nontender and nondistended  Neurologic: Patient is awake, alert and oriented ×3.   Heidi Powers these tests on the individual orders. [address] (BLOOD TYPE)   ANTIBODY SCREEN       ED Course as of Dec 07 1739  ------------------------------------------------------------  The case was discussed with Dr. Julito San from gynecology.   He does not recommen

## 2017-12-07 NOTE — ED INITIAL ASSESSMENT (HPI)
Pt sent from [facility] for low hgb. Pt is at NH for rehab from d & c one week ago here at [facility]. Pt denies pain, sob, weakness.

## 2017-12-11 ENCOUNTER — APPOINTMENT (OUTPATIENT)
Dept: GENERAL RADIOLOGY | Facility: HOSPITAL | Age: 72
End: 2017-12-11
Attending: INTERNAL MEDICINE
Payer: MEDICARE

## 2017-12-11 ENCOUNTER — SNF/IP PROF CHARGE ONLY (OUTPATIENT)
Dept: INTERNAL MEDICINE CLINIC | Facility: CLINIC | Age: 72
End: 2017-12-11

## 2017-12-11 ENCOUNTER — APPOINTMENT (OUTPATIENT)
Dept: GENERAL RADIOLOGY | Facility: HOSPITAL | Age: 72
End: 2017-12-11
Attending: PODIATRIST
Payer: MEDICARE

## 2017-12-11 ENCOUNTER — SNF VISIT (OUTPATIENT)
Dept: INTERNAL MEDICINE CLINIC | Facility: SKILLED NURSING FACILITY | Age: 72
End: 2017-12-11

## 2017-12-11 ENCOUNTER — HOSPITAL ENCOUNTER (OUTPATIENT)
Facility: HOSPITAL | Age: 72
Setting detail: OBSERVATION
Discharge: NURSING, REHAB OR LONG-TERM CARE | End: 2017-12-12
Attending: INTERNAL MEDICINE | Admitting: INTERNAL MEDICINE
Payer: MEDICARE

## 2017-12-11 ENCOUNTER — LAB ENCOUNTER (OUTPATIENT)
Dept: LAB | Facility: HOSPITAL | Age: 72
End: 2017-12-11
Attending: INTERNAL MEDICINE
Payer: MEDICARE

## 2017-12-11 DIAGNOSIS — S92.345D CLOSED NONDISPLACED FRACTURE OF FOURTH METATARSAL BONE OF LEFT FOOT WITH ROUTINE HEALING: Chronic | ICD-10-CM

## 2017-12-11 DIAGNOSIS — I50.31 ACUTE DIASTOLIC CONGESTIVE HEART FAILURE (HCC): ICD-10-CM

## 2017-12-11 DIAGNOSIS — N93.9 VAGINAL BLEEDING: Primary | ICD-10-CM

## 2017-12-11 DIAGNOSIS — N30.00 ACUTE CYSTITIS WITHOUT HEMATURIA: ICD-10-CM

## 2017-12-11 DIAGNOSIS — N93.9 VAGINAL BLEEDING: ICD-10-CM

## 2017-12-11 DIAGNOSIS — D64.9 ANEMIA, UNSPECIFIED TYPE: ICD-10-CM

## 2017-12-11 DIAGNOSIS — J96.90 RESPIRATORY FAILURE, UNSPECIFIED CHRONICITY, UNSPECIFIED WHETHER WITH HYPOXIA OR HYPERCAPNIA (HCC): ICD-10-CM

## 2017-12-11 DIAGNOSIS — D50.0 BLOOD LOSS ANEMIA: ICD-10-CM

## 2017-12-11 PROBLEM — E66.01 MORBID OBESITY (HCC): Status: ACTIVE | Noted: 2017-12-11

## 2017-12-11 LAB
ANTIBODY SCREEN: NEGATIVE
GLUCOSE BLDC GLUCOMTR-MCNC: 140 MG/DL (ref 70–99)
GLUCOSE BLDC GLUCOMTR-MCNC: 179 MG/DL (ref 70–99)
RH BLOOD TYPE: POSITIVE

## 2017-12-11 PROCEDURE — 73630 X-RAY EXAM OF FOOT: CPT | Performed by: INTERNAL MEDICINE

## 2017-12-11 PROCEDURE — 30233N1 TRANSFUSION OF NONAUTOLOGOUS RED BLOOD CELLS INTO PERIPHERAL VEIN, PERCUTANEOUS APPROACH: ICD-10-PCS | Performed by: INTERNAL MEDICINE

## 2017-12-11 PROCEDURE — 85025 COMPLETE CBC W/AUTO DIFF WBC: CPT

## 2017-12-11 PROCEDURE — 36415 COLL VENOUS BLD VENIPUNCTURE: CPT

## 2017-12-11 PROCEDURE — 99218 INITIAL OBSERVATION CARE,LEVL I: CPT | Performed by: INTERNAL MEDICINE

## 2017-12-11 PROCEDURE — 99309 SBSQ NF CARE MODERATE MDM 30: CPT | Performed by: CLINICAL NURSE SPECIALIST

## 2017-12-11 RX ORDER — AMLODIPINE BESYLATE 10 MG/1
10 TABLET ORAL DAILY
Status: DISCONTINUED | OUTPATIENT
Start: 2017-12-12 | End: 2017-12-12

## 2017-12-11 RX ORDER — POLYETHYLENE GLYCOL 3350 17 G/17G
17 POWDER, FOR SOLUTION ORAL 2 TIMES DAILY
Status: DISCONTINUED | OUTPATIENT
Start: 2017-12-11 | End: 2017-12-12

## 2017-12-11 RX ORDER — ACETAMINOPHEN 325 MG/1
325 TABLET ORAL EVERY 6 HOURS PRN
COMMUNITY

## 2017-12-11 RX ORDER — ATORVASTATIN CALCIUM 40 MG/1
40 TABLET, FILM COATED ORAL DAILY
Status: DISCONTINUED | OUTPATIENT
Start: 2017-12-12 | End: 2017-12-12

## 2017-12-11 RX ORDER — DEXTROSE MONOHYDRATE 25 G/50ML
50 INJECTION, SOLUTION INTRAVENOUS AS NEEDED
Status: DISCONTINUED | OUTPATIENT
Start: 2017-12-11 | End: 2017-12-12

## 2017-12-11 RX ORDER — DOCUSATE SODIUM 100 MG/1
100 CAPSULE, LIQUID FILLED ORAL 2 TIMES DAILY
Status: DISCONTINUED | OUTPATIENT
Start: 2017-12-11 | End: 2017-12-12

## 2017-12-11 RX ORDER — GLIMEPIRIDE 2 MG/1
2 TABLET ORAL
Status: DISCONTINUED | OUTPATIENT
Start: 2017-12-12 | End: 2017-12-12

## 2017-12-11 RX ORDER — ISOSORBIDE DINITRATE 10 MG/1
10 TABLET ORAL
Status: DISCONTINUED | OUTPATIENT
Start: 2017-12-11 | End: 2017-12-12

## 2017-12-11 RX ORDER — FUROSEMIDE 10 MG/ML
20 INJECTION INTRAMUSCULAR; INTRAVENOUS ONCE
Status: COMPLETED | OUTPATIENT
Start: 2017-12-11 | End: 2017-12-12

## 2017-12-11 RX ORDER — MEGESTROL ACETATE 40 MG/1
80 TABLET ORAL 4 TIMES DAILY
Status: DISCONTINUED | OUTPATIENT
Start: 2017-12-11 | End: 2017-12-12

## 2017-12-11 RX ORDER — ACETAMINOPHEN 325 MG/1
650 TABLET ORAL EVERY 6 HOURS PRN
Status: DISCONTINUED | OUTPATIENT
Start: 2017-12-11 | End: 2017-12-12

## 2017-12-11 RX ORDER — IPRATROPIUM BROMIDE AND ALBUTEROL SULFATE 2.5; .5 MG/3ML; MG/3ML
3 SOLUTION RESPIRATORY (INHALATION) EVERY 6 HOURS PRN
Status: DISCONTINUED | OUTPATIENT
Start: 2017-12-11 | End: 2017-12-12

## 2017-12-11 RX ORDER — CYCLOBENZAPRINE HCL 10 MG
10 TABLET ORAL EVERY 8 HOURS PRN
Status: DISCONTINUED | OUTPATIENT
Start: 2017-12-11 | End: 2017-12-12

## 2017-12-11 RX ORDER — IPRATROPIUM BROMIDE AND ALBUTEROL SULFATE 2.5; .5 MG/3ML; MG/3ML
3 SOLUTION RESPIRATORY (INHALATION) EVERY 6 HOURS PRN
Status: ON HOLD | COMMUNITY
End: 2018-02-08

## 2017-12-11 RX ORDER — SODIUM CHLORIDE 0.9 % (FLUSH) 0.9 %
10 SYRINGE (ML) INJECTION AS NEEDED
Status: DISCONTINUED | OUTPATIENT
Start: 2017-12-11 | End: 2017-12-12

## 2017-12-11 RX ORDER — SODIUM CHLORIDE 9 MG/ML
INJECTION, SOLUTION INTRAVENOUS ONCE
Status: COMPLETED | OUTPATIENT
Start: 2017-12-11 | End: 2017-12-12

## 2017-12-11 RX ORDER — MELATONIN
650 2 TIMES DAILY WITH MEALS
Status: DISCONTINUED | OUTPATIENT
Start: 2017-12-11 | End: 2017-12-12

## 2017-12-11 NOTE — PROGRESS NOTES
Omkar Loera : 1945 Age 74year old female patient is admitted to [facility] for OWEN   Chief complaint: blood loss anemia, F/u vaginal bleeding, Left foot fracture, Respiratory failure and CHF   Patient is a 73 yo female with a needs transfusion of 2 Units PRBC. Will arrange for transfusion at [facility].  Case discussed w/ [facility] ED case manager, bed available for transfusion today. Discussed plan of care w/ pt who is agreeable for transfer to [facility] for transfusion.   Anticipa noted in diaper. + Salas catheter   MUSCULOSKELETAL: LLE splint in place   EXTREMITIES/VASCULAR:+2 edema RLE, unable to assess LLE 2/2 splint w/ dressing   LINES, TUBES, DRAINS: Salas catheter   SKIN: warm, dry, pale   NEUROLOGIC: follows commands   PSYCHI discussed with nurse and . Appointment is for f/u not a procedure and pt needs to be seen as soon as possible.      7. Endometrial cancer   - s/p D&C on 11/28   - Uterine biopsy suggestive of Grade 1 endometrial cancer   - will monitor labs   - [address]

## 2017-12-12 VITALS
BODY MASS INDEX: 51.91 KG/M2 | WEIGHT: 293 LBS | HEART RATE: 78 BPM | OXYGEN SATURATION: 94 % | HEIGHT: 63 IN | DIASTOLIC BLOOD PRESSURE: 51 MMHG | RESPIRATION RATE: 18 BRPM | TEMPERATURE: 98 F | SYSTOLIC BLOOD PRESSURE: 135 MMHG

## 2017-12-12 LAB
BASOPHILS # BLD: 0.1 K/UL (ref 0–0.2)
BASOPHILS NFR BLD: 1 %
EOSINOPHIL # BLD: 0.2 K/UL (ref 0–0.7)
EOSINOPHIL NFR BLD: 2 %
ERYTHROCYTE [DISTWIDTH] IN BLOOD BY AUTOMATED COUNT: 19.2 % (ref 11–15)
GLUCOSE BLDC GLUCOMTR-MCNC: 133 MG/DL (ref 70–99)
GLUCOSE BLDC GLUCOMTR-MCNC: 165 MG/DL (ref 70–99)
HBA1C MFR BLD: 4.4 % (ref 4–6)
HCT VFR BLD AUTO: 24.1 % (ref 35–48)
HGB BLD-MCNC: 8.1 G/DL (ref 12–16)
LYMPHOCYTES # BLD: 1.2 K/UL (ref 1–4)
LYMPHOCYTES NFR BLD: 14 %
MCH RBC QN AUTO: 32.4 PG (ref 27–32)
MCHC RBC AUTO-ENTMCNC: 33.8 G/DL (ref 32–37)
MCV RBC AUTO: 95.6 FL (ref 80–100)
MONOCYTES # BLD: 0.4 K/UL (ref 0–1)
MONOCYTES NFR BLD: 5 %
MRSA DNA SPEC QL NAA+PROBE: NEGATIVE
NEUTROPHILS # BLD AUTO: 6.5 K/UL (ref 1.8–7.7)
NEUTROPHILS NFR BLD: 79 %
PLATELET # BLD AUTO: 393 K/UL (ref 140–400)
PMV BLD AUTO: 7 FL (ref 7.4–10.3)
RBC # BLD AUTO: 2.52 M/UL (ref 3.7–5.4)
WBC # BLD AUTO: 8.3 K/UL (ref 4–11)

## 2017-12-12 PROCEDURE — 99217 OBSERVATION CARE DISCHARGE: CPT | Performed by: INTERNAL MEDICINE

## 2017-12-12 RX ORDER — SODIUM CHLORIDE 9 MG/ML
INJECTION, SOLUTION INTRAVENOUS
Status: COMPLETED
Start: 2017-12-12 | End: 2017-12-12

## 2017-12-12 NOTE — DISCHARGE SUMMARY
DISCHARGE SUMMARY     Bill Reyna Patient Status:  Observation    1945 MRN [id number]   Location [facility] 4W/SW/SE Attending Hernan Fairchild MD   Hosp Day # 0 PCP Mal Alfaro MD     Date of Admission: 2017  Date of Discharge: Soft. Bowel sounds are normal.   Musculoskeletal:   Left leg/foot on a cast    Neurological: She is oriented to person, place, and time.        •     Discharge Medication List:     Discharge Medications      ASK your doctor about these medications      Ins Take 3 tablets (75 mg total) by mouth every 8 (eight) hours.    Stop taking on:  12/28/2017  Quantity:  270 tablet  Refills:  0     Insulin Aspart Pen 100 UNIT/ML Sopn  Commonly known as:  NOVOLOG      Inject 1-7 Units into the skin 3 (three) times daily daily with breakfast.    Insulin Aspart Pen 100 UNIT/ML Subcutaneous Solution Pen-injector  Inject 1-7 Units into the skin 3 (three) times daily with meals. SAXagliptin HCl (ONGLYZA) 5 MG Oral Tab  Take 2.5 mg by mouth daily.     hydrALAzine HCl 25 MG Or

## 2017-12-12 NOTE — CM/SW NOTE
12/12CM-The Patient's RN informed this Writer that the Patient will need transportation to [facility] today 2/12) at 1:00p. m.and she will need an ambulance because she is not able to ambulate and is a max assist x2.  This Writer met with the Patient at [facility]

## 2017-12-12 NOTE — PLAN OF CARE
2 units of PRBC transfused over night, patient tolerated well. Lasix 20 mg given between units, olivas in place good output. There is still vaginal bleeding no c/o pain.

## 2017-12-12 NOTE — PLAN OF CARE
Diabetes/Glucose Control    • Glucose maintained within prescribed range Adequate for Discharge        DISCHARGE PLANNING    • Discharge to home or other facility with appropriate resources Adequate for Discharge        HEMATOLOGIC - ADULT    • Maintains h

## 2017-12-12 NOTE — H&P
[facility]    History and Physical    Courtney Samilestere Patient Status:  Observation    1945 MRN [id number]   Location [facility] 4W/SW/SE Attending Prakash Busby MD   Hosp Day # 0 PCP Rao Schaefer MD     Date:  2017  Date Cyclobenzaprine HCl 10 MG Oral Tab Take 1 tablet (10 mg total) by mouth every 8 (eight) hours as needed for Muscle spasms (For left leg pain). Miconazole Nitrate 2 % External Powder Apply 1 Application topically as needed for Itching.    isosorbide cami place, and time and obese. HENT:   Head: Normocephalic. Eyes: Pupils are equal, round, and reactive to light. Neck: Normal range of motion. Cardiovascular: Normal rate and regular rhythm. Pulmonary/Chest: No respiratory distress.    Abdominal: So

## 2017-12-12 NOTE — PROGRESS NOTES
[facility] HOSP - [facility]    Report of Consultation    Julia Robles Patient Status:  Observation    1945 MRN [id number]   Location [facility] 4W/SW/SE Attending Kaitlynn Shepherd MD   Hosp Day # 0 PCP Saba Lemon MD     Date of Admission:  15 4 tablet Oral Q15 Min PRN   Insulin Aspart Pen (NOVOLOG) 100 UNIT/ML flexpen 1-5 Units 1-5 Units Subcutaneous TID CC   acetaminophen (TYLENOL) tab 650 mg 650 mg Oral Q6H PRN   AmLODIPine Besylate (NORVASC) tab 10 mg 10 mg Oral Daily   AQUAPHOR (AQUAPHOR) o HCl (ONGLYZA) 5 MG Oral Tab Take 2.5 mg by mouth daily. hydrALAzine HCl 25 MG Oral Tab Take 3 tablets (75 mg total) by mouth every 8 (eight) hours. AmLODIPine Besylate 5 MG Oral Tab Take 1 tablet (5 mg total) by mouth 2 (two) times daily.  (Patient funmilayo There are light excoriations present to anterior shin, dressed with xeroform and overlying dressing - no surrounding erythema or drainage appreciated here either.   Nails are not normal, they are noted to be thickened and elongated bilaterally 1, 2, 3, 4, 5 NWB to the left foot for an additional 2 weeks and recommended new x-rays at that time. Discussed with patient that podiatrist is available at [facility] and that I will discuss her follow-up with Dr. Vanessa Quinteros.   Discussed that when transitioning to weightbe

## 2017-12-12 NOTE — PLAN OF CARE
Received from [facility] in stable condition. No complaints. Noted vaginal bleeding. Plan for 2 unit of PRBC tonight. Oriented to room and safety measures. Call light in easy reach. Noted signed blood consent in chart.

## 2017-12-13 LAB — BLOOD TYPE BARCODE: 5100

## 2017-12-13 PROCEDURE — 99305 1ST NF CARE MODERATE MDM 35: CPT | Performed by: INTERNAL MEDICINE

## 2017-12-14 PROCEDURE — 99308 SBSQ NF CARE LOW MDM 20: CPT | Performed by: INTERNAL MEDICINE

## 2017-12-15 ENCOUNTER — SNF/IP PROF CHARGE ONLY (OUTPATIENT)
Dept: INTERNAL MEDICINE CLINIC | Facility: CLINIC | Age: 72
End: 2017-12-15

## 2017-12-15 ENCOUNTER — OFFICE VISIT (OUTPATIENT)
Dept: CARDIOLOGY CLINIC | Facility: HOSPITAL | Age: 72
End: 2017-12-15
Attending: INTERNAL MEDICINE
Payer: MEDICARE

## 2017-12-15 ENCOUNTER — SNF VISIT (OUTPATIENT)
Dept: INTERNAL MEDICINE CLINIC | Facility: SKILLED NURSING FACILITY | Age: 72
End: 2017-12-15

## 2017-12-15 VITALS — SYSTOLIC BLOOD PRESSURE: 170 MMHG | OXYGEN SATURATION: 99 % | DIASTOLIC BLOOD PRESSURE: 55 MMHG | HEART RATE: 80 BPM

## 2017-12-15 DIAGNOSIS — I50.9 HEART FAILURE, UNSPECIFIED (HCC): ICD-10-CM

## 2017-12-15 DIAGNOSIS — I50.31 ACUTE DIASTOLIC CONGESTIVE HEART FAILURE (HCC): ICD-10-CM

## 2017-12-15 DIAGNOSIS — I27.20 PULMONARY HYPERTENSION, MODERATE TO SEVERE (HCC): ICD-10-CM

## 2017-12-15 DIAGNOSIS — Z09 FOLLOW UP: ICD-10-CM

## 2017-12-15 DIAGNOSIS — D64.9 ANEMIA, UNSPECIFIED TYPE: ICD-10-CM

## 2017-12-15 DIAGNOSIS — S92.345D CLOSED NONDISPLACED FRACTURE OF FOURTH METATARSAL BONE OF LEFT FOOT WITH ROUTINE HEALING: Chronic | ICD-10-CM

## 2017-12-15 DIAGNOSIS — I50.31 ACUTE DIASTOLIC CONGESTIVE HEART FAILURE (HCC): Primary | ICD-10-CM

## 2017-12-15 DIAGNOSIS — N93.9 VAGINAL BLEEDING: ICD-10-CM

## 2017-12-15 DIAGNOSIS — E66.01 MORBID OBESITY (HCC): ICD-10-CM

## 2017-12-15 DIAGNOSIS — I10 ESSENTIAL HYPERTENSION: ICD-10-CM

## 2017-12-15 PROCEDURE — 36415 COLL VENOUS BLD VENIPUNCTURE: CPT | Performed by: NURSE PRACTITIONER

## 2017-12-15 PROCEDURE — 99211 OFF/OP EST MAY X REQ PHY/QHP: CPT | Performed by: NURSE PRACTITIONER

## 2017-12-15 PROCEDURE — 99214 OFFICE O/P EST MOD 30 MIN: CPT | Performed by: NURSE PRACTITIONER

## 2017-12-15 PROCEDURE — 99309 SBSQ NF CARE MODERATE MDM 30: CPT | Performed by: NURSE PRACTITIONER

## 2017-12-15 PROCEDURE — 80048 BASIC METABOLIC PNL TOTAL CA: CPT | Performed by: NURSE PRACTITIONER

## 2017-12-15 NOTE — PROGRESS NOTES
Gwen [address] Patient Status:  Outpatient    1945 MRN [id number]   Location MD Dr. Debi Santos is a 74year old female who presents to clinic for pulmonary ed 11/30/2017 12:06 PM   TP 5.3 (L) 11/30/2017 12:06 PM   AST 25 11/30/2017 12:06 PM   ALT 27 11/30/2017 12:06 PM   TSH 3.15 11/21/2017 06:32 AM   MG 1.8 12/02/2017 06:14 AM   TROP 0.03 11/20/2017 08:33 AM   PGLU 165 (H) 12/12/2017 11:54 AM       Clinical lab heart failure with preserved ejection fraction. Currently residing at [facility] due to inability to stand secondary to broken left foot. Accompanied to clinic with family friend. Currently wearing O2 2L/NC.   Participating in PT/OT at [employer] overload, increased shortness of breath, difficulty breathing when laying down etc. Call the clinic if any of these signs develop at ph: [phone]    Call if having any dizziness, lightheadedness, heart racing, palpitations, chest pain, shortness of jennifer

## 2017-12-15 NOTE — PATIENT INSTRUCTIONS
Increase Isosorbide Dinitrate to 20 mg three times a day    Monitor blood pressure and signs/symptoms of lightheadedness, dizziness, etc.    Attempt weaning off oxygen    Please call the heart failure clinic if you notice a weight gain of 3 pounds overni fatigue.  Stop exercise if you develop chest pain, lightheadedness, or significant shortness of breath

## 2017-12-15 NOTE — PROGRESS NOTES
Courtney Arizmendi : 1945 Age 74year old female patient is admitted to [facility] for OWEN   Chief complaint: blood loss anemia, F/u vaginal bleeding, Left foot fracture, Respiratory failure and CHF   Patient is a 73 yo female with a patient agrees she has minimal bleeding. Past Medical History:   Diagnosis Date   • Back pain   • Diabetes ([address]   • Essential hypertension   • Hyperlipidemia   Past Surgical History:   No date: KNEE REPLACEMENT SURGERY   No family history on file. ASSESSMENT/PLAN  Continues PT/OT. Received transfusion of 2 Units PRBCs at [facility] earlier this week. CBC & BMP 12/18. Radiation/oncology appt has been scheduled with Dr. Taylor Collins on 12/;18 at [address] [place] [address], patient informed by NP & PCP in Rehab aware. 11/28   - Uterine biopsy suggestive of Grade 1 endometrial cancer   - will monitor labs  -Seen by Dr. Maia Mena and thought she wasn't a surgical candidate so referred her ot Radiation/Oncology   - Radiation/oncology appt has been scheduled with Dr. Myron Robles

## 2017-12-18 ENCOUNTER — SNF VISIT (OUTPATIENT)
Dept: INTERNAL MEDICINE CLINIC | Facility: SKILLED NURSING FACILITY | Age: 72
End: 2017-12-18

## 2017-12-18 DIAGNOSIS — C54.1 ENDOMETRIAL CANCER (HCC): ICD-10-CM

## 2017-12-18 DIAGNOSIS — S92.345D CLOSED NONDISPLACED FRACTURE OF FOURTH METATARSAL BONE OF LEFT FOOT WITH ROUTINE HEALING: Chronic | ICD-10-CM

## 2017-12-18 DIAGNOSIS — D64.9 ANEMIA, UNSPECIFIED TYPE: ICD-10-CM

## 2017-12-18 PROCEDURE — 99308 SBSQ NF CARE LOW MDM 20: CPT | Performed by: CLINICAL NURSE SPECIALIST

## 2017-12-19 PROCEDURE — 99308 SBSQ NF CARE LOW MDM 20: CPT | Performed by: INTERNAL MEDICINE

## 2017-12-19 NOTE — PROGRESS NOTES
Clarence Ruiz : 1945 Age 74year old female patient is admitted to [facility] for OWEN     Chief complaint: endometrial cancer, blood loss anemia, F/u vaginal bleeding, Left foot fracture, Respiratory failure and CHF     Patient i for radiation oncology eval. She was also seen bt podiatrist at [facility] on 12/14 w/ plans to repeat portable Xray 12/21.    No Known Allergies   CODE STATUS: Full Code   CURRENT MEDICATIONS: Reviewed on SNF EMR   VITALS: Reviewed   LABS/Imaging: CBC reviewed, Hg 12/14, will repeat portable XR 12/21   - continue cyclobenzaprine   - continue acetaminophen PRN   2. HTN   - seen by cardiology in hospital w/ meds adjustment   - continue amlodipine   - continue hydralazine   - continue Isorsobide at 20mg TID.    3. Diast

## 2017-12-21 PROCEDURE — 99308 SBSQ NF CARE LOW MDM 20: CPT | Performed by: INTERNAL MEDICINE

## 2017-12-22 ENCOUNTER — SNF VISIT (OUTPATIENT)
Dept: INTERNAL MEDICINE CLINIC | Facility: SKILLED NURSING FACILITY | Age: 72
End: 2017-12-22

## 2017-12-22 DIAGNOSIS — D64.9 SEVERE ANEMIA: ICD-10-CM

## 2017-12-22 PROCEDURE — 99310 SBSQ NF CARE HIGH MDM 45: CPT | Performed by: NURSE PRACTITIONER

## 2017-12-22 NOTE — PROGRESS NOTES
Matthew Plaza : 1945 Age 74year old female patient is admitted to [facility] for OWEN     Chief complaint: endometrial cancer, blood loss anemia, & low hemoglobin     Patient is a 75 yo female with a medical history of Diabetes, in AM.       No Known Allergies   CODE STATUS: Full Code   CURRENT MEDICATIONS: Reviewed on SNF EMR   VITALS: Reviewed   LABS/Imaging: CBC reviewed, Hgb 8.0   REVIEW OF SYSTEMS:   GENERAL HEALTH:feels better   HEENT:no visual complaints or deficits   RESPI 12/21   - continue cyclobenzaprine   - continue acetaminophen PRN   2. HTN   - seen by cardiology in hospital w/ meds adjustment   - continue amlodipine   - continue hydralazine   - continue Isorsobide at 20mg TID.    3. Diastolic congestive heart failure

## 2017-12-23 ENCOUNTER — HOSPITAL ENCOUNTER (OUTPATIENT)
Facility: HOSPITAL | Age: 72
Discharge: NURSING, REHAB OR LONG-TERM CARE | End: 2017-12-23
Attending: INTERNAL MEDICINE | Admitting: INTERNAL MEDICINE
Payer: COMMERCIAL

## 2017-12-23 VITALS
OXYGEN SATURATION: 92 % | RESPIRATION RATE: 16 BRPM | HEART RATE: 80 BPM | TEMPERATURE: 98 F | DIASTOLIC BLOOD PRESSURE: 46 MMHG | SYSTOLIC BLOOD PRESSURE: 154 MMHG

## 2017-12-23 LAB
ANTIBODY SCREEN: NEGATIVE
GLUCOSE BLDC GLUCOMTR-MCNC: 135 MG/DL (ref 70–99)
RH BLOOD TYPE: POSITIVE

## 2017-12-23 PROCEDURE — 86900 BLOOD TYPING SEROLOGIC ABO: CPT | Performed by: INTERNAL MEDICINE

## 2017-12-23 PROCEDURE — 36430 TRANSFUSION BLD/BLD COMPNT: CPT

## 2017-12-23 PROCEDURE — 86901 BLOOD TYPING SEROLOGIC RH(D): CPT | Performed by: INTERNAL MEDICINE

## 2017-12-23 PROCEDURE — 30233N1 TRANSFUSION OF NONAUTOLOGOUS RED BLOOD CELLS INTO PERIPHERAL VEIN, PERCUTANEOUS APPROACH: ICD-10-PCS | Performed by: INTERNAL MEDICINE

## 2017-12-23 PROCEDURE — 82962 GLUCOSE BLOOD TEST: CPT

## 2017-12-23 PROCEDURE — 86850 RBC ANTIBODY SCREEN: CPT | Performed by: INTERNAL MEDICINE

## 2017-12-23 PROCEDURE — 86920 COMPATIBILITY TEST SPIN: CPT

## 2017-12-23 RX ORDER — SODIUM CHLORIDE 9 MG/ML
INJECTION, SOLUTION INTRAVENOUS ONCE
Status: DISCONTINUED | OUTPATIENT
Start: 2017-12-23 | End: 2017-12-23

## 2017-12-23 RX ORDER — SODIUM CHLORIDE 0.9 % (FLUSH) 0.9 %
10 SYRINGE (ML) INJECTION AS NEEDED
Status: DISCONTINUED | OUTPATIENT
Start: 2017-12-23 | End: 2017-12-23

## 2017-12-23 RX ORDER — 0.9 % SODIUM CHLORIDE 0.9 %
VIAL (ML) INJECTION
Status: DISCONTINUED
Start: 2017-12-23 | End: 2017-12-23

## 2017-12-24 LAB — BLOOD TYPE BARCODE: 5100

## 2017-12-26 PROCEDURE — 99308 SBSQ NF CARE LOW MDM 20: CPT | Performed by: INTERNAL MEDICINE

## 2017-12-27 ENCOUNTER — SNF VISIT (OUTPATIENT)
Dept: INTERNAL MEDICINE CLINIC | Facility: SKILLED NURSING FACILITY | Age: 72
End: 2017-12-27

## 2017-12-27 DIAGNOSIS — Z09 FOLLOW UP: ICD-10-CM

## 2017-12-27 DIAGNOSIS — D64.9 SEVERE ANEMIA: ICD-10-CM

## 2017-12-27 PROCEDURE — 99310 SBSQ NF CARE HIGH MDM 45: CPT | Performed by: NURSE PRACTITIONER

## 2017-12-27 NOTE — PROGRESS NOTES
Yanira Vasquez : 1945 Age 74year old female patient is admitted to [facility] AND [facility] for OWEN     Chief complaint: F/U endometrial cancer, blood loss anemia, & low hemoglobin     Patient is a 73 yo female with a medical history of Diabet present.  Patient informed of the Blood transfusion in AM.       No Known Allergies   CODE STATUS: Full Code   CURRENT MEDICATIONS: Reviewed on [facility] EMR   VITALS: Reviewed   LABS/Imaging: CBC reviewed, Hgb 8.0   REVIEW OF SYSTEMS:   GENERAL HEALTH:feels bett [place] and Dr. Anai Ansari office called to inquire if its okay to have patient go to [place] for MRI is there is any availability or would they rather reschedule appt date but no response yet from both offices, PCP made aware.  NPRogelio will follow up 12/28 i may need lorazepam for MRI 2/2 claustrophobia   8. DM type II   - continue glimepiride   - continue saxagliptin   - continue Insulin Aspart ss   - Accu check AC TID   - Hgb A1c 5.1 12/1   9.  Constipation   -hold miralax and colace now as pt is having loose

## 2017-12-28 ENCOUNTER — SNF/IP PROF CHARGE ONLY (OUTPATIENT)
Dept: INTERNAL MEDICINE CLINIC | Facility: CLINIC | Age: 72
End: 2017-12-28

## 2017-12-28 ENCOUNTER — HOSPITAL ENCOUNTER (OUTPATIENT)
Facility: HOSPITAL | Age: 72
Discharge: NURSING, REHAB OR LONG-TERM CARE | End: 2017-12-28
Attending: INTERNAL MEDICINE | Admitting: INTERNAL MEDICINE
Payer: COMMERCIAL

## 2017-12-28 VITALS
SYSTOLIC BLOOD PRESSURE: 129 MMHG | RESPIRATION RATE: 18 BRPM | HEART RATE: 73 BPM | DIASTOLIC BLOOD PRESSURE: 46 MMHG | TEMPERATURE: 99 F | OXYGEN SATURATION: 95 %

## 2017-12-28 DIAGNOSIS — D64.9 ANEMIA, UNSPECIFIED TYPE: ICD-10-CM

## 2017-12-28 DIAGNOSIS — E66.01 MORBID OBESITY (HCC): ICD-10-CM

## 2017-12-28 DIAGNOSIS — N93.9 VAGINAL BLEEDING: ICD-10-CM

## 2017-12-28 DIAGNOSIS — R29.6 RECURRENT FALLS: ICD-10-CM

## 2017-12-28 DIAGNOSIS — C54.1 ENDOMETRIAL CANCER (HCC): ICD-10-CM

## 2017-12-28 DIAGNOSIS — I10 ESSENTIAL HYPERTENSION: ICD-10-CM

## 2017-12-28 DIAGNOSIS — D50.0 IRON DEFICIENCY ANEMIA DUE TO CHRONIC BLOOD LOSS: Chronic | ICD-10-CM

## 2017-12-28 DIAGNOSIS — S92.902A FOOT FRACTURE, LEFT, CLOSED, INITIAL ENCOUNTER: ICD-10-CM

## 2017-12-28 DIAGNOSIS — E11.9 TYPE 2 DIABETES MELLITUS WITHOUT COMPLICATION, UNSPECIFIED LONG TERM INSULIN USE STATUS: Chronic | ICD-10-CM

## 2017-12-28 DIAGNOSIS — I27.20 PULMONARY HYPERTENSION, MODERATE TO SEVERE (HCC): ICD-10-CM

## 2017-12-28 DIAGNOSIS — S92.345D CLOSED NONDISPLACED FRACTURE OF FOURTH METATARSAL BONE OF LEFT FOOT WITH ROUTINE HEALING: Chronic | ICD-10-CM

## 2017-12-28 LAB
ANTIBODY SCREEN: NEGATIVE
HGB BLD-MCNC: 7.7 G/DL (ref 12–16)
RH BLOOD TYPE: POSITIVE

## 2017-12-28 PROCEDURE — 86901 BLOOD TYPING SEROLOGIC RH(D): CPT | Performed by: INTERNAL MEDICINE

## 2017-12-28 PROCEDURE — 86850 RBC ANTIBODY SCREEN: CPT | Performed by: INTERNAL MEDICINE

## 2017-12-28 PROCEDURE — 86900 BLOOD TYPING SEROLOGIC ABO: CPT | Performed by: INTERNAL MEDICINE

## 2017-12-28 PROCEDURE — 85018 HEMOGLOBIN: CPT | Performed by: INTERNAL MEDICINE

## 2017-12-28 PROCEDURE — 36430 TRANSFUSION BLD/BLD COMPNT: CPT

## 2017-12-28 PROCEDURE — 86920 COMPATIBILITY TEST SPIN: CPT

## 2017-12-28 PROCEDURE — 30233N1 TRANSFUSION OF NONAUTOLOGOUS RED BLOOD CELLS INTO PERIPHERAL VEIN, PERCUTANEOUS APPROACH: ICD-10-PCS | Performed by: INTERNAL MEDICINE

## 2017-12-28 RX ORDER — SODIUM CHLORIDE 9 MG/ML
INJECTION, SOLUTION INTRAVENOUS ONCE
Status: DISCONTINUED | OUTPATIENT
Start: 2017-12-28 | End: 2017-12-28

## 2017-12-28 RX ORDER — SODIUM CHLORIDE 0.9 % (FLUSH) 0.9 %
10 SYRINGE (ML) INJECTION AS NEEDED
Status: DISCONTINUED | OUTPATIENT
Start: 2017-12-28 | End: 2017-12-28

## 2017-12-28 RX ORDER — ACETAMINOPHEN 325 MG/1
650 TABLET ORAL EVERY 6 HOURS PRN
Status: DISCONTINUED | OUTPATIENT
Start: 2017-12-28 | End: 2017-12-28

## 2017-12-29 ENCOUNTER — SNF VISIT (OUTPATIENT)
Dept: INTERNAL MEDICINE CLINIC | Facility: SKILLED NURSING FACILITY | Age: 72
End: 2017-12-29

## 2017-12-29 ENCOUNTER — MED REC SCAN ONLY (OUTPATIENT)
Dept: INTERNAL MEDICINE CLINIC | Facility: CLINIC | Age: 72
End: 2017-12-29

## 2017-12-29 DIAGNOSIS — Z09 FOLLOW UP: ICD-10-CM

## 2017-12-29 DIAGNOSIS — D64.9 ANEMIA, UNSPECIFIED TYPE: ICD-10-CM

## 2017-12-29 LAB — BLOOD TYPE BARCODE: 5100

## 2017-12-29 PROCEDURE — 99308 SBSQ NF CARE LOW MDM 20: CPT | Performed by: NURSE PRACTITIONER

## 2017-12-30 NOTE — PROGRESS NOTES
Cassie Esqueda : 1945 Age 74year old female patient is admitted to [facility] AND [facility] for OWEN     Chief complaint: Anemia     Patient is a 73 yo female with a medical history of Diabetes, Hyperlipidemia, HTN, pulmonary edema, right sided CBC reviewed, Hgb 8.0   REVIEW OF SYSTEMS:   GENERAL HEALTH:feels better   HEENT:no visual complaints or deficits   RESPIRATORY: intermittent SOB, new oxygen use   CARDIOVASCULAR:denies chest pain, no palpitations   GI: denies nausea, vomiting, constipatio podiatrist 12/14, will repeat portable XR 12/21   - continue cyclobenzaprine   - continue acetaminophen PRN   2. HTN   - seen by cardiology in hospital w/ meds adjustment   - continue amlodipine   - continue hydralazine   - continue Isorsobide at 20mg TID. the future if needed   10.  Hyperlipidemia   -CPM Atorvastatin Daily

## 2018-01-02 ENCOUNTER — TELEPHONE (OUTPATIENT)
Dept: INTERNAL MEDICINE CLINIC | Facility: CLINIC | Age: 73
End: 2018-01-02

## 2018-01-02 ENCOUNTER — OFFICE VISIT (OUTPATIENT)
Dept: CARDIOLOGY CLINIC | Facility: HOSPITAL | Age: 73
End: 2018-01-02
Attending: INTERNAL MEDICINE
Payer: MEDICARE

## 2018-01-02 VITALS
DIASTOLIC BLOOD PRESSURE: 63 MMHG | SYSTOLIC BLOOD PRESSURE: 152 MMHG | HEART RATE: 75 BPM | OXYGEN SATURATION: 94 % | BODY MASS INDEX: 54 KG/M2 | WEIGHT: 293 LBS

## 2018-01-02 DIAGNOSIS — I50.31 ACUTE DIASTOLIC CONGESTIVE HEART FAILURE (HCC): Primary | ICD-10-CM

## 2018-01-02 PROCEDURE — 36415 COLL VENOUS BLD VENIPUNCTURE: CPT | Performed by: NURSE PRACTITIONER

## 2018-01-02 PROCEDURE — 99211 OFF/OP EST MAY X REQ PHY/QHP: CPT | Performed by: NURSE PRACTITIONER

## 2018-01-02 PROCEDURE — 99214 OFFICE O/P EST MOD 30 MIN: CPT | Performed by: NURSE PRACTITIONER

## 2018-01-02 RX ORDER — LISINOPRIL 10 MG/1
10 TABLET ORAL DAILY
COMMUNITY
End: 2018-03-26

## 2018-01-02 RX ORDER — AMLODIPINE BESYLATE 10 MG/1
10 TABLET ORAL DAILY
COMMUNITY
End: 2018-09-13

## 2018-01-02 RX ORDER — LORAZEPAM 1 MG/1
1 TABLET ORAL EVERY 4 HOURS PRN
Status: ON HOLD | COMMUNITY
End: 2018-02-08

## 2018-01-02 NOTE — TELEPHONE ENCOUNTER
to speak to you regarding patient Ling Layne. She stated she wants to talk to you about setting up a blood transfusion for patient. Her cell phone number is [phone]. L foot Fx. D/C for uterine Ca. Work up at [employer]. Blood transfusion qweek. Last was 6.

## 2018-01-02 NOTE — PROGRESS NOTES
Gwen 180 Patient Status:  Outpatient    1945 MRN [id number]   Location MD Dr. Jeb Lopez is a 74year old female who presents to clinic for pulmonary ed (L) 11/30/2017 12:06 PM   AST 25 11/30/2017 12:06 PM   ALT 27 11/30/2017 12:06 PM   TSH 3.15 11/21/2017 06:32 AM   MG 1.8 12/02/2017 06:14 AM   TROP 0.03 11/20/2017 08:33 AM   PGLU 135 (H) 12/23/2017 12:08 PM       Clinical labs drawn by MA: NA    /6 receptive. Decision Making:   Here for follow up for pulmonary edema and heart failure with preserved ejection fraction. Currently residing at [address] [place] [address]. Accompanied to clinic with family friend.    Participating in PT/OT at rehab, las mg salt/sodium daily. Common high sodium foods include frozen dinners, soups (not homemade), some cereal, vegetable juice, canned vegetables, lunch meats, processed meats like hotdogs, sausage, willingham, pepperoni, soy sauce, pre-packaged rice or potatoes.  Pl

## 2018-01-03 ENCOUNTER — SNF VISIT (OUTPATIENT)
Dept: INTERNAL MEDICINE CLINIC | Facility: SKILLED NURSING FACILITY | Age: 73
End: 2018-01-03

## 2018-01-03 ENCOUNTER — TELEPHONE (OUTPATIENT)
Dept: INTERNAL MEDICINE CLINIC | Facility: CLINIC | Age: 73
End: 2018-01-03

## 2018-01-03 ENCOUNTER — HOSPITAL ENCOUNTER (EMERGENCY)
Facility: HOSPITAL | Age: 73
Discharge: HOME | End: 2018-01-03
Attending: EMERGENCY MEDICINE
Payer: MEDICARE

## 2018-01-03 VITALS
HEIGHT: 63 IN | BODY MASS INDEX: 51.91 KG/M2 | OXYGEN SATURATION: 95 % | WEIGHT: 293 LBS | SYSTOLIC BLOOD PRESSURE: 159 MMHG | TEMPERATURE: 99 F | RESPIRATION RATE: 20 BRPM | DIASTOLIC BLOOD PRESSURE: 61 MMHG | HEART RATE: 80 BPM

## 2018-01-03 DIAGNOSIS — D64.9 ANEMIA, UNSPECIFIED TYPE: ICD-10-CM

## 2018-01-03 DIAGNOSIS — D50.0 IRON DEFICIENCY ANEMIA DUE TO CHRONIC BLOOD LOSS: Primary | ICD-10-CM

## 2018-01-03 DIAGNOSIS — C55 MALIGNANT NEOPLASM OF UTERUS, UNSPECIFIED SITE (HCC): ICD-10-CM

## 2018-01-03 LAB
ANION GAP SERPL CALC-SCNC: 9 MMOL/L (ref 0–18)
ANTIBODY SCREEN: NEGATIVE
BASOPHILS # BLD: 0 K/UL (ref 0–0.2)
BASOPHILS NFR BLD: 0 %
BUN SERPL-MCNC: 26 MG/DL (ref 8–20)
BUN/CREAT SERPL: 27.7 (ref 10–20)
CALCIUM SERPL-MCNC: 8.8 MG/DL (ref 8.5–10.5)
CHLORIDE SERPL-SCNC: 105 MMOL/L (ref 95–110)
CO2 SERPL-SCNC: 23 MMOL/L (ref 22–32)
CREAT SERPL-MCNC: 0.94 MG/DL (ref 0.5–1.5)
EOSINOPHIL # BLD: 0.1 K/UL (ref 0–0.7)
EOSINOPHIL NFR BLD: 2 %
ERYTHROCYTE [DISTWIDTH] IN BLOOD BY AUTOMATED COUNT: 20 % (ref 11–15)
GLUCOSE BLDC GLUCOMTR-MCNC: 175 MG/DL (ref 70–99)
GLUCOSE SERPL-MCNC: 181 MG/DL (ref 70–99)
HCT VFR BLD AUTO: 22 % (ref 35–48)
HGB BLD-MCNC: 7.2 G/DL (ref 12–16)
LYMPHOCYTES # BLD: 1.8 K/UL (ref 1–4)
LYMPHOCYTES NFR BLD: 27 %
MCH RBC QN AUTO: 34.4 PG (ref 27–32)
MCHC RBC AUTO-ENTMCNC: 32.7 G/DL (ref 32–37)
MCV RBC AUTO: 105.1 FL (ref 80–100)
MONOCYTES # BLD: 0.4 K/UL (ref 0–1)
MONOCYTES NFR BLD: 7 %
NEUTROPHILS # BLD AUTO: 4.1 K/UL (ref 1.8–7.7)
NEUTROPHILS NFR BLD: 64 %
OSMOLALITY UR CALC.SUM OF ELEC: 293 MOSM/KG (ref 275–295)
PLATELET # BLD AUTO: 402 K/UL (ref 140–400)
PMV BLD AUTO: 6.8 FL (ref 7.4–10.3)
POTASSIUM SERPL-SCNC: 4.5 MMOL/L (ref 3.3–5.1)
RBC # BLD AUTO: 2.09 M/UL (ref 3.7–5.4)
RH BLOOD TYPE: POSITIVE
SODIUM SERPL-SCNC: 137 MMOL/L (ref 136–144)
WBC # BLD AUTO: 6.5 K/UL (ref 4–11)

## 2018-01-03 PROCEDURE — 36430 TRANSFUSION BLD/BLD COMPNT: CPT

## 2018-01-03 PROCEDURE — 99285 EMERGENCY DEPT VISIT HI MDM: CPT

## 2018-01-03 PROCEDURE — 86900 BLOOD TYPING SEROLOGIC ABO: CPT | Performed by: EMERGENCY MEDICINE

## 2018-01-03 PROCEDURE — 82962 GLUCOSE BLOOD TEST: CPT

## 2018-01-03 PROCEDURE — 85025 COMPLETE CBC W/AUTO DIFF WBC: CPT | Performed by: EMERGENCY MEDICINE

## 2018-01-03 PROCEDURE — 80048 BASIC METABOLIC PNL TOTAL CA: CPT | Performed by: EMERGENCY MEDICINE

## 2018-01-03 PROCEDURE — 36415 COLL VENOUS BLD VENIPUNCTURE: CPT

## 2018-01-03 PROCEDURE — 99309 SBSQ NF CARE MODERATE MDM 30: CPT | Performed by: NURSE PRACTITIONER

## 2018-01-03 PROCEDURE — 86920 COMPATIBILITY TEST SPIN: CPT

## 2018-01-03 PROCEDURE — 86901 BLOOD TYPING SEROLOGIC RH(D): CPT | Performed by: EMERGENCY MEDICINE

## 2018-01-03 PROCEDURE — 86850 RBC ANTIBODY SCREEN: CPT | Performed by: EMERGENCY MEDICINE

## 2018-01-03 RX ORDER — ACETAMINOPHEN 325 MG/1
TABLET ORAL
Status: COMPLETED
Start: 2018-01-03 | End: 2018-01-03

## 2018-01-03 RX ORDER — ACETAMINOPHEN 325 MG/1
650 TABLET ORAL ONCE
Status: COMPLETED | OUTPATIENT
Start: 2018-01-03 | End: 2018-01-03

## 2018-01-03 NOTE — ED INITIAL ASSESSMENT (HPI)
Received pt via EMS. Pt presents to ED with Hgb 6.7. Pt denies cp, sob, dizziness. Pt reports \"I was scheduled to have a blood transfusion today, but I couldn't go b/c they didn't have a room for me\". Pt reports hx of anemia.  Pt reports last blood transf

## 2018-01-03 NOTE — TELEPHONE ENCOUNTER
Miguel Meade from [address] [facility] [address] calling to let you know she is sending patient to [facility].  EHSAN

## 2018-01-03 NOTE — PROGRESS NOTES
Yanira Vasquez : 1945 Age 74year old female patient is admitted to [facility] for OWEN     Chief complaint: Anemia     Patient is a 73 yo female with a medical history of Diabetes, Hyperlipidemia, HTN, pulmonary edema, right sided Hemoglobin of 6.7, Dr. Varun Martines notified. Patient just received 2 units PRBC 12/28. Denies any vaginal bleeding, hematuria or hematochezia. Patient has a hx of vaginal bleeding which she just saw onc/radiology at [facility]. Patient stable at present.     NP al follows commands   PSYCHIATRIC: alert and oriented x 3; affect appropriate     ASSESSMENT/PLAN   Continues PT/OT. See HPI.        1. S/p fall   -L foot fracture   - seen by Dr. Lottie Loco at [address] [place] [address] 12/8, splint removed, ACE applied, NWB to LLE x2 more weeks per glimepiride   - continue saxagliptin   - continue Insulin Aspart ss   - Accu check AC TID   - Hgb A1c 5.1 12/1   9. Constipation   -hold miralax and colace now as pt is having loose stools, will re-evaluate and restart in the future if needed   10.  Hyperli

## 2018-01-03 NOTE — ED PROVIDER NOTES
Patient Seen in: [facility] Emergency Department    History   Patient presents with:  Abnormal Result (metabolic, cardiac)    Stated Complaint:     HPI    Patient is 47-year-old female who presents to the emergency department with a chief complain distress. Abdominal: Soft. Bowel sounds are normal. She exhibits no distension. There is no tenderness. There is no rebound and no guarding. Musculoskeletal: Normal range of motion. Neurological: She is alert and oriented to person, place, and time. RAINBOW DRAW LAVENDER   RAINBOW DRAW DARK GREEN   RAINBOW DRAW LIGHT GREEN   RAINBOW DRAW GOLD   RAINBOW DRAW LAVENDER TALL (BNP)       ED Course as of Jan 03 1710  ------------------------------------------------------------       ISA Robledo

## 2018-01-04 ENCOUNTER — TELEPHONE (OUTPATIENT)
Dept: INTERNAL MEDICINE CLINIC | Facility: CLINIC | Age: 73
End: 2018-01-04

## 2018-01-04 RX ORDER — MONTELUKAST SODIUM 10 MG/1
10 TABLET ORAL NIGHTLY
Qty: 7 TABLET | Refills: 0 | Status: SHIPPED | OUTPATIENT
Start: 2018-01-04 | End: 2018-09-13

## 2018-01-04 NOTE — PROGRESS NOTES
I was phoned by NP at [facility], trying to get transfusion for this lady with chronic vaginal bleeding from her uterine cancer. She had Hgb of 6.7 at [facility]. Tried to transfuse yesterday but there was no bed available.  Brought to ER today for transfusio

## 2018-01-04 NOTE — ED NOTES
The patient's primary care physician Dr. Melva Hilliard came and evaluated the patient in the emergency department and felt that she could be discharged after this unit of blood was completed. The patient and her agree with his understanding.   Following the co

## 2018-01-04 NOTE — TELEPHONE ENCOUNTER
Pt c/o of cough, scratchy throat and congestion in ears and nose today. Pt requesting cough medication. Pt denies fever or SOB.  Please advise

## 2018-01-04 NOTE — TELEPHONE ENCOUNTER
Rx refilled, no pharmacy on file, please send to pharmacy may be more viral.  Try Singulair 10 mg at night and see if it helps if no better follow-up in clinic.

## 2018-01-05 ENCOUNTER — SNF VISIT (OUTPATIENT)
Dept: INTERNAL MEDICINE CLINIC | Facility: SKILLED NURSING FACILITY | Age: 73
End: 2018-01-05

## 2018-01-05 DIAGNOSIS — R06.00 DYSPNEA, UNSPECIFIED TYPE: ICD-10-CM

## 2018-01-05 DIAGNOSIS — R05.8 DRY COUGH: ICD-10-CM

## 2018-01-05 DIAGNOSIS — Z09 FOLLOW UP: ICD-10-CM

## 2018-01-05 PROCEDURE — 99309 SBSQ NF CARE MODERATE MDM 30: CPT | Performed by: NURSE PRACTITIONER

## 2018-01-05 NOTE — PROGRESS NOTES
Jessica William : 1945 Age 74year old female patient is admitted to [facility] for [facility]     Chief complaint: F/U Anemia & Blood transfusion & c/o cough & SOB     Patient is a 73 yo female with a medical history of Diabetes, Hyperli Known Allergies   CODE STATUS: Full Code   CURRENT MEDICATIONS: Reviewed on SNF EMR   VITALS: Reviewed   LABS/Imaging: CBC reviewed, Hgb 8.0   REVIEW OF SYSTEMS:   GENERAL HEALTH:feels better   HEENT:no visual complaints or deficits   RESPIRATORY: +SOB muriel continue hydralazine   - continue Isorsobide at 20mg TID. 3. Diastolic congestive heart failure   -  on 11/20   - seen by cardiology (Dr. Robbie Mix, Dr. Cher Herrera): ?  Outpatient f/u   - in-house cardiology following   - continue lasix 60mg daily   - CHF c

## 2018-01-07 LAB — BLOOD TYPE BARCODE: 5100

## 2018-01-08 ENCOUNTER — APPOINTMENT (OUTPATIENT)
Dept: GENERAL RADIOLOGY | Facility: HOSPITAL | Age: 73
DRG: 755 | End: 2018-01-08
Attending: EMERGENCY MEDICINE
Payer: MEDICARE

## 2018-01-08 ENCOUNTER — HOSPITAL ENCOUNTER (INPATIENT)
Facility: HOSPITAL | Age: 73
LOS: 3 days | Discharge: NURSING, REHAB OR LONG-TERM CARE | DRG: 755 | End: 2018-01-12
Attending: EMERGENCY MEDICINE | Admitting: INTERNAL MEDICINE
Payer: MEDICARE

## 2018-01-08 DIAGNOSIS — D64.9 ANEMIA, UNSPECIFIED TYPE: Primary | ICD-10-CM

## 2018-01-08 LAB
ANION GAP SERPL CALC-SCNC: 9 MMOL/L (ref 0–18)
ANTIBODY SCREEN: NEGATIVE
BASOPHILS # BLD: 0 K/UL (ref 0–0.2)
BASOPHILS NFR BLD: 0 %
BUN SERPL-MCNC: 33 MG/DL (ref 8–20)
BUN/CREAT SERPL: 29.2 (ref 10–20)
CALCIUM SERPL-MCNC: 8.4 MG/DL (ref 8.5–10.5)
CHLORIDE SERPL-SCNC: 103 MMOL/L (ref 95–110)
CO2 SERPL-SCNC: 22 MMOL/L (ref 22–32)
CREAT SERPL-MCNC: 1.13 MG/DL (ref 0.5–1.5)
EOSINOPHIL # BLD: 0.1 K/UL (ref 0–0.7)
EOSINOPHIL NFR BLD: 1 %
ERYTHROCYTE [DISTWIDTH] IN BLOOD BY AUTOMATED COUNT: 20.3 % (ref 11–15)
GLUCOSE SERPL-MCNC: 166 MG/DL (ref 70–99)
HCT VFR BLD AUTO: 14.8 % (ref 35–48)
HGB BLD-MCNC: 4.8 G/DL (ref 12–16)
LYMPHOCYTES # BLD: 2 K/UL (ref 1–4)
LYMPHOCYTES NFR BLD: 29 %
MCH RBC QN AUTO: 35.4 PG (ref 27–32)
MCHC RBC AUTO-ENTMCNC: 32.5 G/DL (ref 32–37)
MCV RBC AUTO: 108.8 FL (ref 80–100)
MONOCYTES # BLD: 0.5 K/UL (ref 0–1)
MONOCYTES NFR BLD: 7 %
NEUTROPHILS # BLD AUTO: 4.5 K/UL (ref 1.8–7.7)
NEUTROPHILS NFR BLD: 64 %
OSMOLALITY UR CALC.SUM OF ELEC: 289 MOSM/KG (ref 275–295)
PLATELET # BLD AUTO: 392 K/UL (ref 140–400)
PMV BLD AUTO: 7.4 FL (ref 7.4–10.3)
POTASSIUM SERPL-SCNC: 4.6 MMOL/L (ref 3.3–5.1)
RBC # BLD AUTO: 1.36 M/UL (ref 3.7–5.4)
RH BLOOD TYPE: POSITIVE
SODIUM SERPL-SCNC: 134 MMOL/L (ref 136–144)
WBC # BLD AUTO: 7 K/UL (ref 4–11)

## 2018-01-08 PROCEDURE — 71045 X-RAY EXAM CHEST 1 VIEW: CPT | Performed by: EMERGENCY MEDICINE

## 2018-01-08 PROCEDURE — 99308 SBSQ NF CARE LOW MDM 20: CPT | Performed by: INTERNAL MEDICINE

## 2018-01-08 RX ORDER — IPRATROPIUM BROMIDE AND ALBUTEROL SULFATE 2.5; .5 MG/3ML; MG/3ML
3 SOLUTION RESPIRATORY (INHALATION) ONCE
Status: COMPLETED | OUTPATIENT
Start: 2018-01-08 | End: 2018-01-08

## 2018-01-08 NOTE — ED INITIAL ASSESSMENT (HPI)
Patient states she is from [facility], she was here recently with a low hemoglobin. Today had blood drawn and NH reports hgb 5.

## 2018-01-09 PROBLEM — I27.20 PULMONARY HTN (HCC): Status: ACTIVE | Noted: 2018-01-09

## 2018-01-09 PROBLEM — E66.01 MORBID OBESITY (HCC): Status: ACTIVE | Noted: 2018-01-09

## 2018-01-09 PROBLEM — E11.8 TYPE 2 DIABETES MELLITUS WITH COMPLICATION, WITHOUT LONG-TERM CURRENT USE OF INSULIN (HCC): Status: ACTIVE | Noted: 2018-01-09

## 2018-01-09 PROBLEM — C54.1 ENDOMETRIAL CANCER (HCC): Status: ACTIVE | Noted: 2018-01-09

## 2018-01-09 PROBLEM — D64.9 ANEMIA, UNSPECIFIED TYPE: Status: ACTIVE | Noted: 2018-01-09

## 2018-01-09 LAB
ANION GAP SERPL CALC-SCNC: 6 MMOL/L (ref 0–18)
BASOPHILS # BLD: 0 K/UL (ref 0–0.2)
BASOPHILS # BLD: 0 K/UL (ref 0–0.2)
BASOPHILS NFR BLD: 0 %
BASOPHILS NFR BLD: 0 %
BUN SERPL-MCNC: 30 MG/DL (ref 8–20)
BUN/CREAT SERPL: 32.6 (ref 10–20)
CALCIUM SERPL-MCNC: 8.2 MG/DL (ref 8.5–10.5)
CHLORIDE SERPL-SCNC: 105 MMOL/L (ref 95–110)
CO2 SERPL-SCNC: 24 MMOL/L (ref 22–32)
CREAT SERPL-MCNC: 0.92 MG/DL (ref 0.5–1.5)
EOSINOPHIL # BLD: 0.1 K/UL (ref 0–0.7)
EOSINOPHIL # BLD: 0.1 K/UL (ref 0–0.7)
EOSINOPHIL NFR BLD: 1 %
EOSINOPHIL NFR BLD: 1 %
ERYTHROCYTE [DISTWIDTH] IN BLOOD BY AUTOMATED COUNT: 21.9 % (ref 11–15)
ERYTHROCYTE [DISTWIDTH] IN BLOOD BY AUTOMATED COUNT: 22.2 % (ref 11–15)
FERRITIN SERPL IA-MCNC: 7391 NG/ML (ref 11–307)
FOLATE SERPL-MCNC: 15.2 NG/ML
GLUCOSE BLDC GLUCOMTR-MCNC: 156 MG/DL (ref 70–99)
GLUCOSE BLDC GLUCOMTR-MCNC: 162 MG/DL (ref 70–99)
GLUCOSE BLDC GLUCOMTR-MCNC: 215 MG/DL (ref 70–99)
GLUCOSE BLDC GLUCOMTR-MCNC: 221 MG/DL (ref 70–99)
GLUCOSE BLDC GLUCOMTR-MCNC: 221 MG/DL (ref 70–99)
GLUCOSE SERPL-MCNC: 158 MG/DL (ref 70–99)
HAPTOGLOB SERPL-MCNC: 281 MG/DL (ref 16–200)
HBA1C MFR BLD: 4.3 % (ref 4–6)
HCT VFR BLD AUTO: 21.2 % (ref 35–48)
HCT VFR BLD AUTO: 22.3 % (ref 35–48)
HEMOCCULT STL QL: NEGATIVE
HGB BLD-MCNC: 7.2 G/DL (ref 12–16)
HGB BLD-MCNC: 7.5 G/DL (ref 12–16)
LYMPHOCYTES # BLD: 2.4 K/UL (ref 1–4)
LYMPHOCYTES # BLD: 2.9 K/UL (ref 1–4)
LYMPHOCYTES NFR BLD: 33 %
LYMPHOCYTES NFR BLD: 34 %
MCH RBC QN AUTO: 33.4 PG (ref 27–32)
MCH RBC QN AUTO: 33.5 PG (ref 27–32)
MCHC RBC AUTO-ENTMCNC: 33.8 G/DL (ref 32–37)
MCHC RBC AUTO-ENTMCNC: 33.9 G/DL (ref 32–37)
MCV RBC AUTO: 98.5 FL (ref 80–100)
MCV RBC AUTO: 99.1 FL (ref 80–100)
MONOCYTES # BLD: 0.5 K/UL (ref 0–1)
MONOCYTES # BLD: 0.5 K/UL (ref 0–1)
MONOCYTES NFR BLD: 6 %
MONOCYTES NFR BLD: 7 %
MRSA DNA SPEC QL NAA+PROBE: NEGATIVE
NEUTROPHILS # BLD AUTO: 4 K/UL (ref 1.8–7.7)
NEUTROPHILS # BLD AUTO: 5.1 K/UL (ref 1.8–7.7)
NEUTROPHILS NFR BLD: 58 %
NEUTROPHILS NFR BLD: 60 %
OSMOLALITY UR CALC.SUM OF ELEC: 289 MOSM/KG (ref 275–295)
PLATELET # BLD AUTO: 346 K/UL (ref 140–400)
PLATELET # BLD AUTO: 369 K/UL (ref 140–400)
PMV BLD AUTO: 7 FL (ref 7.4–10.3)
PMV BLD AUTO: 7.2 FL (ref 7.4–10.3)
POTASSIUM SERPL-SCNC: 4.4 MMOL/L (ref 3.3–5.1)
RBC # BLD AUTO: 2.15 M/UL (ref 3.7–5.4)
RBC # BLD AUTO: 2.25 M/UL (ref 3.7–5.4)
RETICS/RBC NFR AUTO: 10.9 % (ref 0.5–1.5)
SODIUM SERPL-SCNC: 135 MMOL/L (ref 136–144)
VIT B12 SERPL-MCNC: 331 PG/ML (ref 181–914)
WBC # BLD AUTO: 6.9 K/UL (ref 4–11)
WBC # BLD AUTO: 8.6 K/UL (ref 4–11)

## 2018-01-09 PROCEDURE — 99223 1ST HOSP IP/OBS HIGH 75: CPT | Performed by: INTERNAL MEDICINE

## 2018-01-09 RX ORDER — IPRATROPIUM BROMIDE AND ALBUTEROL SULFATE 2.5; .5 MG/3ML; MG/3ML
3 SOLUTION RESPIRATORY (INHALATION) 4 TIMES DAILY
COMMUNITY
End: 2018-02-10

## 2018-01-09 RX ORDER — LISINOPRIL 10 MG/1
10 TABLET ORAL DAILY
Status: DISCONTINUED | OUTPATIENT
Start: 2018-01-09 | End: 2018-01-12

## 2018-01-09 RX ORDER — ALBUTEROL SULFATE 2.5 MG/3ML
2.5 SOLUTION RESPIRATORY (INHALATION) EVERY 4 HOURS PRN
COMMUNITY
End: 2018-06-04

## 2018-01-09 RX ORDER — MONTELUKAST SODIUM 10 MG/1
10 TABLET ORAL NIGHTLY
Status: DISCONTINUED | OUTPATIENT
Start: 2018-01-10 | End: 2018-01-12

## 2018-01-09 RX ORDER — ATORVASTATIN CALCIUM 40 MG/1
40 TABLET, FILM COATED ORAL NIGHTLY
Status: DISCONTINUED | OUTPATIENT
Start: 2018-01-10 | End: 2018-01-12

## 2018-01-09 RX ORDER — LORAZEPAM 1 MG/1
1 TABLET ORAL DAILY PRN
Status: DISCONTINUED | OUTPATIENT
Start: 2018-01-09 | End: 2018-01-12

## 2018-01-09 RX ORDER — CYCLOBENZAPRINE HCL 10 MG
10 TABLET ORAL 3 TIMES DAILY PRN
COMMUNITY
End: 2019-07-25

## 2018-01-09 RX ORDER — ACETAMINOPHEN 325 MG/1
650 TABLET ORAL EVERY 6 HOURS PRN
Status: ON HOLD | COMMUNITY
End: 2018-02-08

## 2018-01-09 RX ORDER — FUROSEMIDE 10 MG/ML
20 INJECTION INTRAMUSCULAR; INTRAVENOUS ONCE
Status: COMPLETED | OUTPATIENT
Start: 2018-01-09 | End: 2018-01-09

## 2018-01-09 RX ORDER — MEGESTROL ACETATE 40 MG/1
80 TABLET ORAL 4 TIMES DAILY
Status: ON HOLD | COMMUNITY
End: 2018-02-08

## 2018-01-09 RX ORDER — POLYETHYLENE GLYCOL 3350 17 G/17G
17 POWDER, FOR SOLUTION ORAL DAILY PRN
Status: DISCONTINUED | OUTPATIENT
Start: 2018-01-09 | End: 2018-01-12

## 2018-01-09 RX ORDER — DEXTROSE MONOHYDRATE 25 G/50ML
50 INJECTION, SOLUTION INTRAVENOUS AS NEEDED
Status: DISCONTINUED | OUTPATIENT
Start: 2018-01-09 | End: 2018-01-12

## 2018-01-09 RX ORDER — INSULIN ASPART 100 [IU]/ML
INJECTION, SOLUTION INTRAVENOUS; SUBCUTANEOUS
COMMUNITY
End: 2018-08-30

## 2018-01-09 RX ORDER — IPRATROPIUM BROMIDE AND ALBUTEROL SULFATE 2.5; .5 MG/3ML; MG/3ML
3 SOLUTION RESPIRATORY (INHALATION) EVERY 4 HOURS PRN
COMMUNITY
End: 2018-09-13 | Stop reason: ALTCHOICE

## 2018-01-09 RX ORDER — ACETAMINOPHEN 325 MG/1
650 TABLET ORAL EVERY 6 HOURS PRN
Status: DISCONTINUED | OUTPATIENT
Start: 2018-01-09 | End: 2018-01-09

## 2018-01-09 RX ORDER — LORAZEPAM 1 MG/1
1 TABLET ORAL DAILY PRN
Status: ON HOLD | COMMUNITY
End: 2018-02-08

## 2018-01-09 RX ORDER — 0.9 % SODIUM CHLORIDE 0.9 %
VIAL (ML) INJECTION
Status: COMPLETED
Start: 2018-01-09 | End: 2018-01-09

## 2018-01-09 RX ORDER — IPRATROPIUM BROMIDE AND ALBUTEROL SULFATE 2.5; .5 MG/3ML; MG/3ML
3 SOLUTION RESPIRATORY (INHALATION) 4 TIMES DAILY
Status: DISCONTINUED | OUTPATIENT
Start: 2018-01-09 | End: 2018-01-12

## 2018-01-09 RX ORDER — MEGESTROL ACETATE 40 MG/1
80 TABLET ORAL 4 TIMES DAILY
Status: DISCONTINUED | OUTPATIENT
Start: 2018-01-09 | End: 2018-01-12

## 2018-01-09 RX ORDER — AMLODIPINE BESYLATE 10 MG/1
10 TABLET ORAL DAILY
Status: DISCONTINUED | OUTPATIENT
Start: 2018-01-10 | End: 2018-01-12

## 2018-01-09 RX ORDER — ALBUTEROL SULFATE 2.5 MG/3ML
2.5 SOLUTION RESPIRATORY (INHALATION) EVERY 4 HOURS PRN
Status: DISCONTINUED | OUTPATIENT
Start: 2018-01-09 | End: 2018-01-12

## 2018-01-09 RX ORDER — AMLODIPINE BESYLATE 10 MG/1
10 TABLET ORAL DAILY
Status: ON HOLD | COMMUNITY
End: 2018-02-08

## 2018-01-09 RX ORDER — LISINOPRIL 10 MG/1
10 TABLET ORAL DAILY
Status: ON HOLD | COMMUNITY
End: 2018-02-08

## 2018-01-09 RX ORDER — CYCLOBENZAPRINE HCL 10 MG
10 TABLET ORAL 3 TIMES DAILY PRN
Status: DISCONTINUED | OUTPATIENT
Start: 2018-01-09 | End: 2018-01-12

## 2018-01-09 RX ORDER — MONTELUKAST SODIUM 10 MG/1
10 TABLET ORAL NIGHTLY
Status: ON HOLD | COMMUNITY
End: 2018-02-08

## 2018-01-09 RX ORDER — PANTOPRAZOLE SODIUM 40 MG/1
40 TABLET, DELAYED RELEASE ORAL
Status: DISCONTINUED | OUTPATIENT
Start: 2018-01-10 | End: 2018-01-12

## 2018-01-09 RX ORDER — DOCUSATE SODIUM 100 MG/1
100 CAPSULE, LIQUID FILLED ORAL 2 TIMES DAILY
Status: DISCONTINUED | OUTPATIENT
Start: 2018-01-09 | End: 2018-01-12

## 2018-01-09 RX ORDER — GUAIFENESIN/DEXTROMETHORPHAN 100-10MG/5
5 SYRUP ORAL
COMMUNITY
End: 2018-08-30

## 2018-01-09 RX ORDER — ATORVASTATIN CALCIUM 40 MG/1
40 TABLET, FILM COATED ORAL NIGHTLY
COMMUNITY
End: 2018-09-13

## 2018-01-09 RX ORDER — SODIUM CHLORIDE 0.9 % (FLUSH) 0.9 %
3 SYRINGE (ML) INJECTION AS NEEDED
Status: DISCONTINUED | OUTPATIENT
Start: 2018-01-09 | End: 2018-01-12

## 2018-01-09 RX ORDER — GUAIFENESIN/DEXTROMETHORPHAN 100-10MG/5
10 SYRUP ORAL EVERY 6 HOURS
Status: DISCONTINUED | OUTPATIENT
Start: 2018-01-09 | End: 2018-01-12

## 2018-01-09 RX ORDER — IPRATROPIUM BROMIDE AND ALBUTEROL SULFATE 2.5; .5 MG/3ML; MG/3ML
3 SOLUTION RESPIRATORY (INHALATION) EVERY 4 HOURS PRN
Status: DISCONTINUED | OUTPATIENT
Start: 2018-01-09 | End: 2018-01-12

## 2018-01-09 RX ORDER — SODIUM PHOSPHATE, DIBASIC AND SODIUM PHOSPHATE, MONOBASIC 7; 19 G/133ML; G/133ML
1 ENEMA RECTAL ONCE AS NEEDED
Status: DISCONTINUED | OUTPATIENT
Start: 2018-01-09 | End: 2018-01-12

## 2018-01-09 RX ORDER — BISACODYL 10 MG
10 SUPPOSITORY, RECTAL RECTAL
Status: DISCONTINUED | OUTPATIENT
Start: 2018-01-09 | End: 2018-01-12

## 2018-01-09 RX ORDER — ACETAMINOPHEN 325 MG/1
650 TABLET ORAL EVERY 6 HOURS PRN
Status: DISCONTINUED | OUTPATIENT
Start: 2018-01-09 | End: 2018-01-12

## 2018-01-09 NOTE — ED PROVIDER NOTES
Patient Seen in: [facility] Emergency Department    History   Patient presents with:  Anemia (hematologic)    Stated Complaint: Low hgb    HPI    Patient is a 15-year-old female who presents to the emergency department after apparently having bloo Rectal exam shows guaiac negative stool. Musculoskeletal: Normal range of motion. Neurological: She is alert and oriented to person, place, and time. Skin: Skin is warm and dry. No rash noted. There is pallor.    Psychiatric: She has a normal mood and RAINBOW DRAW BLUE   RAINBOW DRAW LAVENDER   RAINBOW DRAW DARK GREEN   RAINBOW DRAW LIGHT GREEN   RAINBOW DRAW GOLD   RAINBOW DRAW LAVENDER TALL (BNP)       ED Course as of Jan 08 2051  ------------------------------------------------------------       MD

## 2018-01-09 NOTE — CONSULTS
[facility]   Gastroenterology Consultation Note    Jessica William  Patient Status:    Observation  Date of Admission:         1/8/2018, Hospital day #0  Attending:   Ashley Davis MD  PCP:     Esteban Garcia MD    Reason for Consultation High cholesterol    • History of blood transfusion    • Incontinence    • Osteoarthritis    • Shortness of breath    • Sleep apnea      Past Surgical History:  No date: TOTAL KNEE REPLACEMENT  Family History   Problem Relation Age of Onset   • Cancer Anna Oral, Nightly  •  dextrose 50% injection 50 mL, 50 mL, Intravenous, PRN  •  Glucose-Vitamin C (DEX-4) 4-0.006 g chewable tab 4 tablet, 4 tablet, Oral, Q15 Min PRN  •  Insulin Aspart Pen (NOVOLOG) 100 UNIT/ML flexpen 1-5 Units, 1-5 Units, Subcutaneous, TID PMHx uterine cancer, DM, morbid obesity, ASHLEY, urinary incontinence, HTN and OA who presented to the ED with Hgb 4.8 --> 7.5 s/p transfusion. Recently transfused at [facility] for vaginal bleeding 2/2 uterine cancer.  Patient is a somewhat poor histor ED with symptomatic anemia. No signs of active GI bleeding. She has intermittent vaginal bleeding from uterine cancer. GI consulted for anemia. Exam shows an obese woman, without ab pain and chronic LE edema. Labs show improvement in Hgb with transfusion.  G

## 2018-01-10 ENCOUNTER — APPOINTMENT (OUTPATIENT)
Dept: GENERAL RADIOLOGY | Facility: HOSPITAL | Age: 73
DRG: 755 | End: 2018-01-10
Attending: INTERNAL MEDICINE
Payer: MEDICARE

## 2018-01-10 ENCOUNTER — APPOINTMENT (OUTPATIENT)
Dept: MRI IMAGING | Facility: HOSPITAL | Age: 73
DRG: 755 | End: 2018-01-10
Attending: INTERNAL MEDICINE
Payer: MEDICARE

## 2018-01-10 LAB
ALBUMIN SERPL BCP-MCNC: 2.4 G/DL (ref 3.5–4.8)
ALP SERPL-CCNC: 92 U/L (ref 32–100)
ALT SERPL-CCNC: 25 U/L (ref 14–54)
ANION GAP SERPL CALC-SCNC: 5 MMOL/L (ref 0–18)
AST SERPL-CCNC: 22 U/L (ref 15–41)
BASOPHILS # BLD: 0 K/UL (ref 0–0.2)
BASOPHILS # BLD: 0 K/UL (ref 0–0.2)
BASOPHILS NFR BLD: 0 %
BASOPHILS NFR BLD: 1 %
BILIRUB DIRECT SERPL-MCNC: 0.3 MG/DL (ref 0–0.2)
BILIRUB SERPL-MCNC: 1.6 MG/DL (ref 0.3–1.2)
BLOOD TYPE BARCODE: 9500
BUN SERPL-MCNC: 22 MG/DL (ref 8–20)
BUN/CREAT SERPL: 26.2 (ref 10–20)
C DIFF TOX B STL QL: POSITIVE
CALCIUM SERPL-MCNC: 8.5 MG/DL (ref 8.5–10.5)
CHLORIDE SERPL-SCNC: 105 MMOL/L (ref 95–110)
CO2 SERPL-SCNC: 25 MMOL/L (ref 22–32)
CREAT SERPL-MCNC: 0.84 MG/DL (ref 0.5–1.5)
DIRECT COOMBS POLY: NEGATIVE
EOSINOPHIL # BLD: 0.1 K/UL (ref 0–0.7)
EOSINOPHIL # BLD: 0.1 K/UL (ref 0–0.7)
EOSINOPHIL NFR BLD: 1 %
EOSINOPHIL NFR BLD: 2 %
ERYTHROCYTE [DISTWIDTH] IN BLOOD BY AUTOMATED COUNT: 21.3 % (ref 11–15)
ERYTHROCYTE [DISTWIDTH] IN BLOOD BY AUTOMATED COUNT: 22 % (ref 11–15)
GLUCOSE BLDC GLUCOMTR-MCNC: 163 MG/DL (ref 70–99)
GLUCOSE BLDC GLUCOMTR-MCNC: 176 MG/DL (ref 70–99)
GLUCOSE BLDC GLUCOMTR-MCNC: 201 MG/DL (ref 70–99)
GLUCOSE BLDC GLUCOMTR-MCNC: 215 MG/DL (ref 70–99)
GLUCOSE SERPL-MCNC: 181 MG/DL (ref 70–99)
HCT VFR BLD AUTO: 18.8 % (ref 35–48)
HCT VFR BLD AUTO: 21.8 % (ref 35–48)
HGB BLD-MCNC: 6.3 G/DL (ref 12–16)
HGB BLD-MCNC: 7.2 G/DL (ref 12–16)
IRON SATN MFR SERPL: 32 % (ref 15–50)
IRON SERPL-MCNC: 69 MCG/DL (ref 28–170)
LDH SERPL L TO P-CCNC: 178 U/L (ref 98–192)
LYMPHOCYTES # BLD: 1.9 K/UL (ref 1–4)
LYMPHOCYTES # BLD: 2.2 K/UL (ref 1–4)
LYMPHOCYTES NFR BLD: 29 %
LYMPHOCYTES NFR BLD: 33 %
MCH RBC QN AUTO: 33.3 PG (ref 27–32)
MCH RBC QN AUTO: 33.8 PG (ref 27–32)
MCHC RBC AUTO-ENTMCNC: 33.1 G/DL (ref 32–37)
MCHC RBC AUTO-ENTMCNC: 33.5 G/DL (ref 32–37)
MCV RBC AUTO: 100.5 FL (ref 80–100)
MCV RBC AUTO: 101 FL (ref 80–100)
MONOCYTES # BLD: 0.4 K/UL (ref 0–1)
MONOCYTES # BLD: 0.5 K/UL (ref 0–1)
MONOCYTES NFR BLD: 7 %
MONOCYTES NFR BLD: 7 %
NEUTROPHILS # BLD AUTO: 3.9 K/UL (ref 1.8–7.7)
NEUTROPHILS # BLD AUTO: 4.1 K/UL (ref 1.8–7.7)
NEUTROPHILS NFR BLD: 58 %
NEUTROPHILS NFR BLD: 63 %
OSMOLALITY UR CALC.SUM OF ELEC: 288 MOSM/KG (ref 275–295)
PLATELET # BLD AUTO: 329 K/UL (ref 140–400)
PLATELET # BLD AUTO: 345 K/UL (ref 140–400)
PMV BLD AUTO: 6.9 FL (ref 7.4–10.3)
PMV BLD AUTO: 7 FL (ref 7.4–10.3)
POTASSIUM SERPL-SCNC: 4.9 MMOL/L (ref 3.3–5.1)
PROT SERPL-MCNC: 5.2 G/DL (ref 5.9–8.4)
RBC # BLD AUTO: 1.86 M/UL (ref 3.7–5.4)
RBC # BLD AUTO: 2.17 M/UL (ref 3.7–5.4)
SODIUM SERPL-SCNC: 135 MMOL/L (ref 136–144)
TIBC SERPL-MCNC: 215 MCG/DL (ref 228–428)
TRANSFERRIN SERPL-MCNC: 163 MG/DL (ref 192–382)
WBC # BLD AUTO: 6.5 K/UL (ref 4–11)
WBC # BLD AUTO: 6.8 K/UL (ref 4–11)

## 2018-01-10 PROCEDURE — 99232 SBSQ HOSP IP/OBS MODERATE 35: CPT | Performed by: PHYSICIAN ASSISTANT

## 2018-01-10 PROCEDURE — 99232 SBSQ HOSP IP/OBS MODERATE 35: CPT | Performed by: INTERNAL MEDICINE

## 2018-01-10 PROCEDURE — 99233 SBSQ HOSP IP/OBS HIGH 50: CPT | Performed by: INTERNAL MEDICINE

## 2018-01-10 PROCEDURE — 72197 MRI PELVIS W/O & W/DYE: CPT | Performed by: INTERNAL MEDICINE

## 2018-01-10 PROCEDURE — 71046 X-RAY EXAM CHEST 2 VIEWS: CPT | Performed by: INTERNAL MEDICINE

## 2018-01-10 RX ORDER — SODIUM CHLORIDE 9 MG/ML
INJECTION, SOLUTION INTRAVENOUS ONCE
Status: COMPLETED | OUTPATIENT
Start: 2018-01-10 | End: 2018-01-10

## 2018-01-10 RX ORDER — SODIUM CHLORIDE 0.9 % (FLUSH) 0.9 %
10 SYRINGE (ML) INJECTION AS NEEDED
Status: DISCONTINUED | OUTPATIENT
Start: 2018-01-10 | End: 2018-01-12

## 2018-01-10 NOTE — PROGRESS NOTES
[facility]     Gastroenterology Progress Note    Matthew Pachecorico Patient Status:  Inpatient    1945 MRN [id number]   Location [facility]5W Attending Mortimer Birks, MD   Hosp Day # 1 PCP Candance Shack, MD       Subjective: Diff. MRI with rectal findings - likely 2/2 C. Diff. Again, no GI blood loss has been appreciated. #Symptomatic Anemia: today again with decreasing blood counts  #Diarrhea: C.  Diff (+)  #Uterine Cancer  #Morbid Obesity     Recommend:  - Transfuse to ma preliminary report was submitted and there are no major discrepancies.

## 2018-01-10 NOTE — PROGRESS NOTES
[facility]    Progress Note    Jessica William Patient Status:  Inpatient    1945 MRN [id number]   Location [facility]W Attending Ashley Davis MD   Hosp Day # 1 PCP Esteban Garcia MD     Subjective:     Constitutional: Posit use of insulin ([facility] [address]- will continue meds, will follow       Wheezing- improved but till on lower filds, will order a cxr   This am on exam with some wheeze and rales to lower filed, maybe due to transfusion , will give one dose of Lasix, 20 also will give

## 2018-01-10 NOTE — H&P
[facility] HOSP - [facility]    History and Physical    Vinh Duffy Patient Status:  Observation    1945 MRN [id number]   Location [facility]5W Attending Day Nam MD   Hosp Day # 0 PCP Jennifer Cabrera MD     Date:  2018  Date of Admis History     Past Medical History:   Diagnosis Date   • Cancer ([facility]     uterine cancerr   • COPD (chronic obstructive pulmonary disease) ([facility]    • Diabetes ([facility]    • Fibromyalgia    • High blood pressure    • High cholesterol    • History of bloo Take 10 mg by mouth nightly. INSULIN ASPART 100 UNIT/ML Subcutaneous Solution Inject into the skin 4 (four) times daily before meals and nightly. SAXAGLIPTIN HCL OR Take 2.5 mg by mouth daily with breakfast.       Review of Systems:     HENT: Negative. transfused 3 units, hg up to > 7, GI called on consult, Hematology and ob, will follow       Endometrial cancer ([facility] [facility]- follows at [facility], for radiation , needs MRI , will order while here        Pulmonary HTN- with recent echo showed sever PHT, pt with sle

## 2018-01-10 NOTE — PROGRESS NOTES
[facility] HOSP - [facility]    Hematology/Oncology   Progress Note    Reginia Riser Patient Status:  Inpatient    1945 MRN [id number]   Location [facility]5W Attending Bertha Lopez MD   Hosp Day # 1 PCP Alyce Jimenez MD     Subjective:  No findings are most compatible with stage Ib disease. 2. Rectal findings that could relate to inflammation or infection, correlate with symptoms. Consider sigmoidoscopy/colonoscopy if not recently performed and if clinically indicated.   3. Right lower abdom

## 2018-01-10 NOTE — PLAN OF CARE
Problem: Diabetes/Glucose Control  Goal: Glucose maintained within prescribed range  INTERVENTIONS:  - Monitor Blood Glucose as ordered  - Assess for signs and symptoms of hyperglycemia and hypoglycemia  - Administer ordered medications to maintain glucose turned    Problem: RISK FOR INFECTION - ADULT  Goal: Absence of fever/infection during anticipated neutropenic period  INTERVENTIONS  - Monitor WBC  - Administer growth factors as ordered  - Implement neutropenic guidelines   Outcome: Progressing  VSS  Lab

## 2018-01-10 NOTE — CONSULTS
[facility]    PATIENT'S NAME: Rogelio Yo   ATTENDING PHYSICIAN: Sue Posada MD   CONSULTING PHYSICIAN: Vanessa Fuentes.  Sravan Castillo MD   PATIENT ACCOUNT#:   [id number]    LOCATION:  [address] [place] [address] RECORD #:   [id number]       DATE OF BIRTH:  [birth year] She is otherwise without complaints. PAST MEDICAL HISTORY:  Stage I endometrial cancer, diabetes mellitus, morbid obesity, COPD, fibromyalgia, hyperlipidemia, hypertension, osteoarthritis, sleep apnea, status post total knee replacement.     MEDICATIONS the last week is most consistent with either blood loss anemia or peripheral destruction. We will send a reticulocyte count to help evaluate further. We will also send LDH and haptoglobin and review of peripheral blood film.   Given she is macrocytic, B12

## 2018-01-10 NOTE — PHYSICAL THERAPY NOTE
PHYSICAL THERAPY EVALUATION - INPATIENT     Room Number: 004/177-P  Evaluation Date: 1/10/2018  Type of Evaluation: Initial  Physician Order: PT Eval and Treat    Presenting Problem: Anemia with blood transfusion  Reason for Therapy: Mobility Dysfuncti urinary incontinence and OA who presented to ED with Hgb 4.8 - symptomatic anemic. Absence of GI bleeding. Hospital course with negative occult blood, no black/bloody stools, (+) C. Diff (on vancomycin).  Patient's Hgb continues to down-trend, currently 6.3 strength :see OT    Lower extremity ROM is within functional limits except for the following:  L foot limited AROM for DF/PF    Lower extremity strength is R LE; graded: 3-/5                                            L LE graded 3-/5<>2+/5           James Banks Transfers:NT    Exercise/Education Provided:  ROM  Strengthening    Patient End of Session: In bed;Needs met;Call light within reach;RN aware of session/findings; All patient questions and concerns addressed    CURRENT GOALS    Goals to be met by: 1/22/

## 2018-01-10 NOTE — CM/SW NOTE
SW self-referred to meet w/ pt due to case finding and diagnosis. Pt is from [facility] rehab, where she has been there for about 3 weeks. Pt plans to return to [facility] at discharge. Pt on 2LO2 and will need ambulance at discharge. Per pt's RN, pt is +Cdiff.  KANA notif

## 2018-01-11 ENCOUNTER — APPOINTMENT (OUTPATIENT)
Dept: RADIATION ONCOLOGY | Facility: HOSPITAL | Age: 73
End: 2018-01-11
Attending: RADIOLOGY
Payer: MEDICARE

## 2018-01-11 DIAGNOSIS — C54.1 ENDOMETRIAL CANCER (HCC): Primary | ICD-10-CM

## 2018-01-11 LAB
ANION GAP SERPL CALC-SCNC: 4 MMOL/L (ref 0–18)
BASOPHILS # BLD: 0 K/UL (ref 0–0.2)
BASOPHILS NFR BLD: 0 %
BLOOD TYPE BARCODE: 5100
BUN SERPL-MCNC: 24 MG/DL (ref 8–20)
BUN/CREAT SERPL: 28.6 (ref 10–20)
CALCIUM SERPL-MCNC: 8.6 MG/DL (ref 8.5–10.5)
CHLORIDE SERPL-SCNC: 105 MMOL/L (ref 95–110)
CO2 SERPL-SCNC: 26 MMOL/L (ref 22–32)
CREAT SERPL-MCNC: 0.84 MG/DL (ref 0.5–1.5)
EOSINOPHIL # BLD: 0.1 K/UL (ref 0–0.7)
EOSINOPHIL NFR BLD: 2 %
ERYTHROCYTE [DISTWIDTH] IN BLOOD BY AUTOMATED COUNT: 21.7 % (ref 11–15)
GLUCOSE BLDC GLUCOMTR-MCNC: 165 MG/DL (ref 70–99)
GLUCOSE BLDC GLUCOMTR-MCNC: 167 MG/DL (ref 70–99)
GLUCOSE BLDC GLUCOMTR-MCNC: 185 MG/DL (ref 70–99)
GLUCOSE BLDC GLUCOMTR-MCNC: 247 MG/DL (ref 70–99)
GLUCOSE SERPL-MCNC: 167 MG/DL (ref 70–99)
HCT VFR BLD AUTO: 21 % (ref 35–48)
HGB BLD-MCNC: 6.9 G/DL (ref 12–16)
LYMPHOCYTES # BLD: 2.3 K/UL (ref 1–4)
LYMPHOCYTES NFR BLD: 29 %
MCH RBC QN AUTO: 33.6 PG (ref 27–32)
MCHC RBC AUTO-ENTMCNC: 32.9 G/DL (ref 32–37)
MCV RBC AUTO: 102.2 FL (ref 80–100)
MONOCYTES # BLD: 0.5 K/UL (ref 0–1)
MONOCYTES NFR BLD: 6 %
NEUTROPHILS # BLD AUTO: 5 K/UL (ref 1.8–7.7)
NEUTROPHILS NFR BLD: 63 %
OSMOLALITY UR CALC.SUM OF ELEC: 288 MOSM/KG (ref 275–295)
PLATELET # BLD AUTO: 343 K/UL (ref 140–400)
PMV BLD AUTO: 7.1 FL (ref 7.4–10.3)
POTASSIUM SERPL-SCNC: 4.8 MMOL/L (ref 3.3–5.1)
RBC # BLD AUTO: 2.06 M/UL (ref 3.7–5.4)
SODIUM SERPL-SCNC: 135 MMOL/L (ref 136–144)
WBC # BLD AUTO: 7.9 K/UL (ref 4–11)

## 2018-01-11 PROCEDURE — 99233 SBSQ HOSP IP/OBS HIGH 50: CPT | Performed by: INTERNAL MEDICINE

## 2018-01-11 PROCEDURE — 30233N1 TRANSFUSION OF NONAUTOLOGOUS RED BLOOD CELLS INTO PERIPHERAL VEIN, PERCUTANEOUS APPROACH: ICD-10-PCS | Performed by: INTERNAL MEDICINE

## 2018-01-11 PROCEDURE — 99232 SBSQ HOSP IP/OBS MODERATE 35: CPT | Performed by: PHYSICIAN ASSISTANT

## 2018-01-11 RX ORDER — SODIUM CHLORIDE 0.9 % (FLUSH) 0.9 %
10 SYRINGE (ML) INJECTION AS NEEDED
Status: DISCONTINUED | OUTPATIENT
Start: 2018-01-11 | End: 2018-01-12

## 2018-01-11 RX ORDER — SODIUM CHLORIDE 9 MG/ML
INJECTION, SOLUTION INTRAVENOUS ONCE
Status: COMPLETED | OUTPATIENT
Start: 2018-01-11 | End: 2018-01-12

## 2018-01-11 RX ORDER — SODIUM CHLORIDE 9 MG/ML
INJECTION, SOLUTION INTRAVENOUS
Status: COMPLETED
Start: 2018-01-11 | End: 2018-01-11

## 2018-01-11 NOTE — PROGRESS NOTES
Received call from [facility] NP from [address] [place] [address] with Dr. Millie Cameron (rad/onc). States that records are not available thru care everywhere and asked to get a release of info to Dr. Milile Cameron via fax [phone].  Pts next appt is Jan 23 and per [facility], they may start the r

## 2018-01-11 NOTE — PLAN OF CARE
Problem: Diabetes/Glucose Control  Goal: Glucose maintained within prescribed range  INTERVENTIONS:  - Monitor Blood Glucose as ordered  - Assess for signs and symptoms of hyperglycemia and hypoglycemia  - Administer ordered medications to maintain glucose fever/infection during anticipated neutropenic period  INTERVENTIONS  - Monitor WBC  - Administer growth factors as ordered  - Implement neutropenic guidelines   Outcome: Progressing  VSS  Labs in the morning      Problem: SAFETY ADULT - FALL  Goal: Free f

## 2018-01-11 NOTE — PROGRESS NOTES
[facility]     Gastroenterology Progress Note    Ariel Ilsavidal Patient Status:  Inpatient    1945 MRN [id number]   Location [facility]W Attending Sofi Jain MD   Hosp Day # 2 PCP Alton Sevilla MD       Subjective: today again with decreasing blood counts  #Diarrhea: C. Diff (+)  #Uterine Cancer  #Morbid Obesity      Recommend:  - Transfuse to maintain Hgb > 7 per heme/onc recommendations  - Monitor for signs of GI bleeding  - Continue Vancomycin for C.  Diff - treat lower abdominal fat containing hernia.

## 2018-01-11 NOTE — PROGRESS NOTES
[facility]    Progress Note    Lexi Border Patient Status:  Inpatient    1945 MRN [id number]   Location [facility]5W Attending Nan Tesfaye MD   Hosp Day # 2 PCP Simba Purcell MD     Subjective:     Constitutional: Posit problem with moving, and getting up             Type 2 diabetes mellitus with complication, without long-term current use of insulin ([facility]- will continue meds, will follow       Wheezing- improving but till on lower filds, cxr stable with no infiltrates clinically indicated. 3. Right lower abdominal fat containing hernia.                     John Blackwell MD  1/11/2018

## 2018-01-11 NOTE — PLAN OF CARE
[id number] - Dr. Leni Stanley paged. Waiting for response  [id number]- Dr Tom Marrufo repaged. [phone]- Dr Leni Stanley response, make sure patient it typed and screen but hold off on infusion until  sees patient today.

## 2018-01-11 NOTE — PHYSICAL THERAPY NOTE
Pt on hold today Hg 6.9 and will be receiving blood transfusion today. Will re attempt tomorrow if appropriate.

## 2018-01-11 NOTE — OCCUPATIONAL THERAPY NOTE
Pt with low HgB. Scheduled to receive 2 units this afternoon. Plan to reschedule session for 1/12/18.

## 2018-01-12 ENCOUNTER — SNF/IP PROF CHARGE ONLY (OUTPATIENT)
Dept: INTERNAL MEDICINE CLINIC | Facility: CLINIC | Age: 73
End: 2018-01-12

## 2018-01-12 VITALS
TEMPERATURE: 98 F | HEIGHT: 63 IN | OXYGEN SATURATION: 96 % | SYSTOLIC BLOOD PRESSURE: 145 MMHG | BODY MASS INDEX: 51.09 KG/M2 | RESPIRATION RATE: 18 BRPM | DIASTOLIC BLOOD PRESSURE: 54 MMHG | WEIGHT: 288.31 LBS | HEART RATE: 72 BPM

## 2018-01-12 DIAGNOSIS — I27.20 PULMONARY HTN (HCC): ICD-10-CM

## 2018-01-12 DIAGNOSIS — E11.8 TYPE 2 DIABETES MELLITUS WITH COMPLICATION, WITHOUT LONG-TERM CURRENT USE OF INSULIN (HCC): ICD-10-CM

## 2018-01-12 DIAGNOSIS — E66.01 MORBID OBESITY (HCC): ICD-10-CM

## 2018-01-12 DIAGNOSIS — D64.9 ANEMIA, UNSPECIFIED TYPE: ICD-10-CM

## 2018-01-12 LAB
ANION GAP SERPL CALC-SCNC: 4 MMOL/L (ref 0–18)
ANTIBODY SCREEN: NEGATIVE
BASOPHILS # BLD: 0 K/UL (ref 0–0.2)
BASOPHILS NFR BLD: 0 %
BLOOD TYPE BARCODE: 5100
BUN SERPL-MCNC: 24 MG/DL (ref 8–20)
BUN/CREAT SERPL: 28.9 (ref 10–20)
CALCIUM SERPL-MCNC: 8.6 MG/DL (ref 8.5–10.5)
CHLORIDE SERPL-SCNC: 105 MMOL/L (ref 95–110)
CO2 SERPL-SCNC: 25 MMOL/L (ref 22–32)
CREAT SERPL-MCNC: 0.83 MG/DL (ref 0.5–1.5)
EOSINOPHIL # BLD: 0.1 K/UL (ref 0–0.7)
EOSINOPHIL NFR BLD: 2 %
ERYTHROCYTE [DISTWIDTH] IN BLOOD BY AUTOMATED COUNT: 20.7 % (ref 11–15)
GLUCOSE BLDC GLUCOMTR-MCNC: 145 MG/DL (ref 70–99)
GLUCOSE BLDC GLUCOMTR-MCNC: 200 MG/DL (ref 70–99)
GLUCOSE BLDC GLUCOMTR-MCNC: 218 MG/DL (ref 70–99)
GLUCOSE SERPL-MCNC: 167 MG/DL (ref 70–99)
HCT VFR BLD AUTO: 22.2 % (ref 35–48)
HGB BLD-MCNC: 7.2 G/DL (ref 12–16)
LYMPHOCYTES # BLD: 2.1 K/UL (ref 1–4)
LYMPHOCYTES NFR BLD: 37 %
MCH RBC QN AUTO: 32.9 PG (ref 27–32)
MCHC RBC AUTO-ENTMCNC: 32.6 G/DL (ref 32–37)
MCV RBC AUTO: 100.8 FL (ref 80–100)
MONOCYTES # BLD: 0.3 K/UL (ref 0–1)
MONOCYTES NFR BLD: 6 %
NEUTROPHILS # BLD AUTO: 3.1 K/UL (ref 1.8–7.7)
NEUTROPHILS NFR BLD: 55 %
OSMOLALITY UR CALC.SUM OF ELEC: 286 MOSM/KG (ref 275–295)
PLATELET # BLD AUTO: 327 K/UL (ref 140–400)
PMV BLD AUTO: 7.1 FL (ref 7.4–10.3)
POTASSIUM SERPL-SCNC: 4.8 MMOL/L (ref 3.3–5.1)
RBC # BLD AUTO: 2.2 M/UL (ref 3.7–5.4)
RH BLOOD TYPE: POSITIVE
SODIUM SERPL-SCNC: 134 MMOL/L (ref 136–144)
WBC # BLD AUTO: 5.7 K/UL (ref 4–11)

## 2018-01-12 PROCEDURE — 99232 SBSQ HOSP IP/OBS MODERATE 35: CPT | Performed by: INTERNAL MEDICINE

## 2018-01-12 PROCEDURE — 99239 HOSP IP/OBS DSCHRG MGMT >30: CPT | Performed by: INTERNAL MEDICINE

## 2018-01-12 PROCEDURE — 99232 SBSQ HOSP IP/OBS MODERATE 35: CPT | Performed by: PHYSICIAN ASSISTANT

## 2018-01-12 RX ORDER — SODIUM CHLORIDE 0.9 % (FLUSH) 0.9 %
10 SYRINGE (ML) INJECTION AS NEEDED
Status: DISCONTINUED | OUTPATIENT
Start: 2018-01-12 | End: 2018-01-12

## 2018-01-12 RX ORDER — PANTOPRAZOLE SODIUM 40 MG/1
40 TABLET, DELAYED RELEASE ORAL
Qty: 30 TABLET | Refills: 2 | Status: SHIPPED | OUTPATIENT
Start: 2018-01-13 | End: 2018-09-13

## 2018-01-12 RX ORDER — SODIUM CHLORIDE 9 MG/ML
INJECTION, SOLUTION INTRAVENOUS ONCE
Status: COMPLETED | OUTPATIENT
Start: 2018-01-12 | End: 2018-01-12

## 2018-01-12 RX ORDER — FERROUS SULFATE 325(65) MG
325 TABLET ORAL 2 TIMES DAILY
Qty: 60 TABLET | Refills: 2 | Status: SHIPPED | OUTPATIENT
Start: 2018-01-12 | End: 2018-11-08

## 2018-01-12 NOTE — PHYSICAL THERAPY NOTE
PHYSICAL THERAPY TREATMENT NOTE - INPATIENT    Room Number: 968/846-W       Presenting Problem: Anemia with blood transfusion    Problem List  Principal Problem:    Anemia, unspecified type  Active Problems:    Anemia    Endometrial cancer ([facility]    Pulmon over in bed (including adjusting bedclothes, sheets and blankets)?: A Lot   -   Sitting down on and standing up from a chair with arms (e.g., wheelchair, bedside commode, etc.): Unable   -   Moving from lying on back to sitting on the side of the bed?: A L

## 2018-01-12 NOTE — CONSULTS
[facility] [place]    PATIENT'S NAME: Ana Maria Al   RADIATION ONCOLOGIST: Yefri Villeda.  Carolene Peabody, MD   PATIENT ACCOUNT #: [de-identified] LOCATION: [address] [place] [address] RECORD #: [id number] YOB: 1945   CONSULTATION DATE: 01/11/2018       RADIATI history of back pain, congestive heart disease, diabetes, hypertension, hyperlipidemia, endometrial cancer, severe iron deficiency.      PAST SURGICAL HISTORY:  Knee replacement and a D and C.    MEDICATIONS:  On admission, she was on acetaminophen, amlodip vaginal bleeding, and yet this would still be enough to be dropping her hemoglobin so precipitously. I do feel that radiation may be worthwhile in her case to control her endometrial cancer, but this is better done as an outpatient.   When she is deemed me

## 2018-01-12 NOTE — PLAN OF CARE
Problem: Diabetes/Glucose Control  Goal: Glucose maintained within prescribed range  INTERVENTIONS:  - Monitor Blood Glucose as ordered  - Assess for signs and symptoms of hyperglycemia and hypoglycemia  - Administer ordered medications to maintain glucose Progressing      Problem: SAFETY ADULT - FALL  Goal: Free from fall injury  INTERVENTIONS:  - Assess pt frequently for physical needs  - Identify cognitive and physical deficits and behaviors that affect risk of falls.   - [place] fall precautions as diana

## 2018-01-12 NOTE — PROGRESS NOTES
[facility] [address]     Gastroenterology Progress Note    Jessica William Patient Status:  Inpatient    1945 MRN [id number]   Location [facility]5W Attending Ashley Davis MD   Hosp Day # 3 PCP Esteban Garcia MD       Subjective: Cancer  #Morbid Obesity      Recommend:  - Transfuse to maintain Hgb > 7 per heme/onc recommendations  - Monitor for signs of GI bleeding  - Continue Vancomycin for C.  Diff - treat for total of 14 days   - PUD ppx: PPI  - Diet as tolerated     OK to be tra

## 2018-01-12 NOTE — PROGRESS NOTES
[facility] HOSP - [facility]    Hematology/Oncology   Progress Note    Claire Krueger Patient Status:  Inpatient    1945 MRN [id number]   Location [facility]W Attending Eileen Valle MD   Hosp Day # 3 PCP Layla Ornelas MD     Subjective:  No anterior uterine wall myometrial involvement, but no definite extension to the cervix, parauterine soft tissues, adnexa, or visualized pelvic lymph nodes. 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   2. Rectal findings that could relate to

## 2018-01-12 NOTE — PROGRESS NOTES
[facility] HOSP - [facility]    Progress Note    Lowell Phelan Patient Status:  Inpatient    1945 MRN [id number]   Location [facility]5W Attending Maribel West MD   Hosp Day # 3 PCP Meggan Castillo MD     Subjective:   Subjective:    Jaylin Chong

## 2018-01-12 NOTE — OCCUPATIONAL THERAPY NOTE
OCCUPATIONAL THERAPY EVALUATION - INPATIENT     Room Number: 353/380-T  Evaluation Date: 1/12/2018  Type of Evaluation: Initial  Presenting Problem:  (Anemia )    Physician Order: IP Consult to Occupational Therapy  Reason for Therapy: ADL/IADL Dysfunction Problem List  Principal Problem:    Anemia, unspecified type  Active Problems:    Anemia    Endometrial cancer ([facility] [address] [facility]    Pulmonary HTN    Morbid obesity ([facility] [address] [facility]    Iron deficiency    Type 2 diabetes mellitus with complication, without long-term current us WFL       ACTIVITIES OF DAILY LIVING ASSESSMENT  AM-PAC ‘6-Clicks’ Inpatient Daily Activity Short Form  How much help from another person does the patient currently need…  -   Putting on and taking off regular lower body clothing?: Total  -   Bathing (incl

## 2018-01-12 NOTE — CM/SW NOTE
Addend 346p: RN requested SW to arranged transport for 6pm. SW spoke w/ Ward from [facility] and arranged ambulance for 6pm today to [facility]. SW notified Abraham from [facility] who stated they will be ready for pt at 6pm.     RN to call report to [facility] at [phone].

## 2018-01-12 NOTE — DISCHARGE SUMMARY
DISCHARGE SUMMARY     Cari Valadez Patient Status:  Inpatient    1945 MRN [id number]   Location [facility]5W Attending Sadi Mendieta MD   Hosp Day # 3 PCP Kamila Beach MD     Date of Admission: 2018  Date of Discharge: 2018  Disc age and cooperative  Pulmonary: clear to auscultation bilaterally  Cardiovascular: S1, S2 normal, no murmur, click, rub or gallop, regular rate and rhythm  Abdominal: soft, non-tender; bowel sounds normal; no masses,  no organomegaly    Procedures during h daily. Refills:  0     lisinopril 10 MG Tabs  Commonly known as:  PRINIVIL,ZESTRIL      Take 10 mg by mouth daily. Refills:  0     LORazepam 1 MG Tabs  Commonly known as:  ATIVAN      Take 1 mg by mouth daily as needed for Anxiety.    Refills:  0     Me 100-10 MG/5ML Oral Syrup  Take 10 mL by mouth every 6 (six) hours. Montelukast Sodium 10 MG Oral Tab  Take 10 mg by mouth nightly. INSULIN ASPART 100 UNIT/ML Subcutaneous Solution  Inject into the skin 4 (four) times daily before meals and nightly.

## 2018-01-13 LAB — BLOOD TYPE BARCODE: 5100

## 2018-01-15 LAB — NUCLEAR IGG TITR SER IF: NEGATIVE {TITER}

## 2018-01-16 PROCEDURE — 99308 SBSQ NF CARE LOW MDM 20: CPT | Performed by: INTERNAL MEDICINE

## 2018-01-16 NOTE — DISCHARGE SUMMARY
DISCHARGE SUMMARY     Ling Layne Patient Status:  Inpatient    1945 MRN [id number]   Location [address] [place] [address] Attending No att. providers found   Hosp Day # 3 PCP Sandeep Deleon MD     Date of Admission: 2018  Date of Discharge: 2018 Discharge Physical Exam: General appearance:  alert, appears stated age and cooperative  Back: symmetric, no curvature. ROM normal. No CVA tenderness.   Pulmonary: clear to auscultation bilaterally  Cardiovascular: S1, S2 normal, no murmur, click, rub o 100-10 MG/5ML Syrp  Commonly known as:  ROBITUSSIN DM      Take 10 mL by mouth every 6 (six) hours. Refills:  0     insulin aspart 100 UNIT/ML Soln  Commonly known as:  NOVOLOG      Inject into the skin 4 (four) times daily before meals and nightly.    Re Oral Tab  Take 650 mg by mouth every 6 (six) hours as needed for Pain., Historical    albuterol sulfate (2.5 MG/3ML) 0.083% Inhalation Nebu Soln  Take 2.5 mg by nebulization every 4 (four) hours as needed for Wheezing or Shortness of Breath., Historical -----------------------------------------------------------------------------------------------  PATIENT DISCHARGE INSTRUCTIONS: See electronic chart    Cathryn Oppenheim, MD 1/15/2018    Time spent:  30 to 74 minutes

## 2018-01-17 ENCOUNTER — SNF VISIT (OUTPATIENT)
Dept: INTERNAL MEDICINE CLINIC | Facility: SKILLED NURSING FACILITY | Age: 73
End: 2018-01-17

## 2018-01-17 DIAGNOSIS — D64.9 ANEMIA, UNSPECIFIED TYPE: ICD-10-CM

## 2018-01-17 DIAGNOSIS — I50.31 ACUTE DIASTOLIC CONGESTIVE HEART FAILURE (HCC): ICD-10-CM

## 2018-01-17 DIAGNOSIS — Z09 FOLLOW UP: ICD-10-CM

## 2018-01-17 DIAGNOSIS — A04.72 C. DIFFICILE DIARRHEA: ICD-10-CM

## 2018-01-17 PROCEDURE — 99310 SBSQ NF CARE HIGH MDM 45: CPT | Performed by: NURSE PRACTITIONER

## 2018-01-17 NOTE — PROGRESS NOTES
Cassie Esqueda : 1945 Age 74year old female patient is admitted to [facility] for [facility]     Chief complaint: Initial APRN Assessment post re-admission F/U Anemia    The patient is a 22-year-old female with a medical history of Diab distress. HEENT: atraumatic/normocephalic, mucous membranes pink and moist, PERRLA, EOMI, sclera anicteric, conjunctiva normal. Neck supple; FROM; no JVD. RESPIRATORY:clear to auscultation, diminished bases, Supplemental oxygen via NC PRN.    CARDIOVASC CPAP/BiPAP in hospital, CPM   - in-house pulmonology following     5. Pulmonary hypertension  -with recent echo showed severe PHT, pt with sleep apnea, will monitor    - in-house pulmonology following     6.  S/p fall   -L foot fracture   - seen by Dr. Gabriella Campbell

## 2018-01-18 PROCEDURE — 99308 SBSQ NF CARE LOW MDM 20: CPT | Performed by: INTERNAL MEDICINE

## 2018-01-21 ENCOUNTER — SNF/IP PROF CHARGE ONLY (OUTPATIENT)
Dept: INTERNAL MEDICINE CLINIC | Facility: CLINIC | Age: 73
End: 2018-01-21

## 2018-01-21 DIAGNOSIS — I10 ESSENTIAL HYPERTENSION: ICD-10-CM

## 2018-01-21 DIAGNOSIS — R29.6 RECURRENT FALLS: ICD-10-CM

## 2018-01-21 DIAGNOSIS — E11.8 TYPE 2 DIABETES MELLITUS WITH COMPLICATION, WITHOUT LONG-TERM CURRENT USE OF INSULIN (HCC): ICD-10-CM

## 2018-01-21 DIAGNOSIS — D64.9 ANEMIA, UNSPECIFIED TYPE: ICD-10-CM

## 2018-01-21 DIAGNOSIS — I27.20 PULMONARY HTN (HCC): ICD-10-CM

## 2018-01-21 DIAGNOSIS — A04.72 C. DIFFICILE DIARRHEA: ICD-10-CM

## 2018-01-21 DIAGNOSIS — C54.1 ENDOMETRIAL CANCER (HCC): ICD-10-CM

## 2018-01-21 DIAGNOSIS — D50.0 IRON DEFICIENCY ANEMIA DUE TO CHRONIC BLOOD LOSS: Chronic | ICD-10-CM

## 2018-01-22 ENCOUNTER — SNF VISIT (OUTPATIENT)
Dept: INTERNAL MEDICINE CLINIC | Facility: SKILLED NURSING FACILITY | Age: 73
End: 2018-01-22

## 2018-01-22 DIAGNOSIS — D64.9 ANEMIA, UNSPECIFIED TYPE: ICD-10-CM

## 2018-01-22 DIAGNOSIS — C54.1 ENDOMETRIAL CANCER (HCC): ICD-10-CM

## 2018-01-22 DIAGNOSIS — A04.72 C. DIFFICILE DIARRHEA: ICD-10-CM

## 2018-01-22 PROCEDURE — 99308 SBSQ NF CARE LOW MDM 20: CPT | Performed by: CLINICAL NURSE SPECIALIST

## 2018-01-22 NOTE — PROGRESS NOTES
Sadia Erazo : 1945 Age 74year old female patient is admitted to [facility] for OWEN   Chief complaint:  F/U Anemia, c.diff, endometrial cancer   The patient is a 18-year-old female with a medical history of Diabetes, Hyperlipid bleeding, hx of vaginal bleeding & endometrial CA, hx of Anemia & seeing OB/Onc. Denies any constipation/melena or diarrhea. Denies abdominal pain, nausea or vomiting.    PHYSICAL EXAM:   GENERAL HEALTH: Pleasant  female,morbidly obese & in no ap recommends hematology to see patient at [facility] for PRN transfusions during her radiation treatment since she will be there 5X/week.    3. Diastolic congestive heart failure   -  on 11/20   - in-house cardiology following   - Lasix D/C due to signific

## 2018-01-23 PROCEDURE — 99308 SBSQ NF CARE LOW MDM 20: CPT | Performed by: INTERNAL MEDICINE

## 2018-01-25 PROCEDURE — 99308 SBSQ NF CARE LOW MDM 20: CPT | Performed by: INTERNAL MEDICINE

## 2018-01-29 ENCOUNTER — SNF VISIT (OUTPATIENT)
Dept: INTERNAL MEDICINE CLINIC | Facility: SKILLED NURSING FACILITY | Age: 73
End: 2018-01-29

## 2018-01-29 DIAGNOSIS — D64.9 ANEMIA, UNSPECIFIED TYPE: ICD-10-CM

## 2018-01-29 DIAGNOSIS — A04.72 C. DIFFICILE DIARRHEA: ICD-10-CM

## 2018-01-29 DIAGNOSIS — C54.1 ENDOMETRIAL CANCER (HCC): ICD-10-CM

## 2018-01-29 PROCEDURE — 99308 SBSQ NF CARE LOW MDM 20: CPT | Performed by: CLINICAL NURSE SPECIALIST

## 2018-01-30 PROCEDURE — 99308 SBSQ NF CARE LOW MDM 20: CPT | Performed by: INTERNAL MEDICINE

## 2018-01-30 NOTE — PROGRESS NOTES
Radha Marcano : 1945 Age 74year old female patient is admitted to [facility] for [facility]       Chief complaint: F/U Anemia, c.diff, endometrial cancer      The patient is a 26-year-old female with a medical history of Diabetes, Hype bleeding & endometrial CA, hx of Anemia & seeing OB/Onc. Denies any constipation/melena or diarrhea. Denies abdominal pain, nausea or vomiting. PHYSICAL EXAM:     GENERAL HEALTH: Pleasant  female,morbidly obese & in no apparent distress. hematology to see patient at [facility] for PRN transfusions during her radiation treatment since she will be there 5X/week if needed    3.  Diastolic congestive heart failure     -  on 11/20     - in-house cardiology following     - Lasix D/C due to sig

## 2018-02-01 PROCEDURE — 99308 SBSQ NF CARE LOW MDM 20: CPT | Performed by: INTERNAL MEDICINE

## 2018-02-05 ENCOUNTER — SNF VISIT (OUTPATIENT)
Dept: INTERNAL MEDICINE CLINIC | Facility: SKILLED NURSING FACILITY | Age: 73
End: 2018-02-05

## 2018-02-05 ENCOUNTER — HOSPITAL ENCOUNTER (OUTPATIENT)
Facility: HOSPITAL | Age: 73
Discharge: NURSING, REHAB OR LONG-TERM CARE | DRG: 812 | End: 2018-02-06
Attending: INTERNAL MEDICINE | Admitting: INTERNAL MEDICINE
Payer: MEDICARE

## 2018-02-05 DIAGNOSIS — R19.5 LOOSE STOOLS: ICD-10-CM

## 2018-02-05 DIAGNOSIS — D64.9 ANEMIA, UNSPECIFIED TYPE: ICD-10-CM

## 2018-02-05 DIAGNOSIS — C54.1 ENDOMETRIAL CANCER (HCC): ICD-10-CM

## 2018-02-05 LAB
ANTIBODY SCREEN: NEGATIVE
RH BLOOD TYPE: POSITIVE

## 2018-02-05 PROCEDURE — 86901 BLOOD TYPING SEROLOGIC RH(D): CPT | Performed by: INTERNAL MEDICINE

## 2018-02-05 PROCEDURE — 30233N1 TRANSFUSION OF NONAUTOLOGOUS RED BLOOD CELLS INTO PERIPHERAL VEIN, PERCUTANEOUS APPROACH: ICD-10-PCS | Performed by: INTERNAL MEDICINE

## 2018-02-05 PROCEDURE — 99309 SBSQ NF CARE MODERATE MDM 30: CPT | Performed by: CLINICAL NURSE SPECIALIST

## 2018-02-05 PROCEDURE — A4216 STERILE WATER/SALINE, 10 ML: HCPCS

## 2018-02-05 PROCEDURE — 86920 COMPATIBILITY TEST SPIN: CPT

## 2018-02-05 PROCEDURE — 36430 TRANSFUSION BLD/BLD COMPNT: CPT

## 2018-02-05 PROCEDURE — 86850 RBC ANTIBODY SCREEN: CPT | Performed by: INTERNAL MEDICINE

## 2018-02-05 PROCEDURE — 86900 BLOOD TYPING SEROLOGIC ABO: CPT | Performed by: INTERNAL MEDICINE

## 2018-02-05 RX ORDER — 0.9 % SODIUM CHLORIDE 0.9 %
VIAL (ML) INJECTION
Status: COMPLETED
Start: 2018-02-05 | End: 2018-02-05

## 2018-02-05 RX ORDER — DIPHENHYDRAMINE HYDROCHLORIDE 50 MG/ML
25 INJECTION INTRAMUSCULAR; INTRAVENOUS ONCE
Status: DISCONTINUED | OUTPATIENT
Start: 2018-02-05 | End: 2018-02-06

## 2018-02-05 RX ORDER — SODIUM CHLORIDE 0.9 % (FLUSH) 0.9 %
10 SYRINGE (ML) INJECTION AS NEEDED
Status: DISCONTINUED | OUTPATIENT
Start: 2018-02-05 | End: 2018-02-06

## 2018-02-05 RX ORDER — IBUPROFEN 400 MG/1
400 TABLET ORAL ONCE
Status: COMPLETED | OUTPATIENT
Start: 2018-02-05 | End: 2018-02-05

## 2018-02-05 RX ORDER — ACETAMINOPHEN 500 MG
1000 TABLET ORAL EVERY 6 HOURS PRN
Status: DISCONTINUED | OUTPATIENT
Start: 2018-02-05 | End: 2018-02-06

## 2018-02-05 RX ORDER — SODIUM CHLORIDE 9 MG/ML
INJECTION, SOLUTION INTRAVENOUS ONCE
Status: COMPLETED | OUTPATIENT
Start: 2018-02-05 | End: 2018-02-05

## 2018-02-05 NOTE — PROGRESS NOTES
Taco Torres : 1945 Age 74year old female patient is admitted to [facility] for OWEN       Chief complaint: F/U Anemia, loose stools, endometrial cancer      The patient is a 77-year-old female with a medical history of Diabetes vaginal bleeding, hx of vaginal bleeding & endometrial CA, hx of Anemia & seeing OB/Onc. Denies any constipation/melena. Denies abdominal pain, nausea or vomiting. + loose stools.     PHYSICAL EXAM:     GENERAL HEALTH: Pleasant  female,morbidly & seen by Dr. Gutierrez Mcmahon 1/15, mapping done and will start radiation today. Dr. Govind Luna recommends hematology to see patient at [facility] for PRN transfusions during her radiation treatment since she will be there 5X/week if needed    3.  Diastolic congestive heart

## 2018-02-06 ENCOUNTER — TELEPHONE (OUTPATIENT)
Dept: INTERNAL MEDICINE CLINIC | Facility: CLINIC | Age: 73
End: 2018-02-06

## 2018-02-06 VITALS
TEMPERATURE: 100 F | RESPIRATION RATE: 18 BRPM | DIASTOLIC BLOOD PRESSURE: 39 MMHG | SYSTOLIC BLOOD PRESSURE: 129 MMHG | OXYGEN SATURATION: 97 % | HEART RATE: 74 BPM

## 2018-02-06 PROCEDURE — 99308 SBSQ NF CARE LOW MDM 20: CPT | Performed by: INTERNAL MEDICINE

## 2018-02-06 NOTE — PROGRESS NOTES
[facility] updated about patient. [facility] Patient aware of plan.

## 2018-02-06 NOTE — TELEPHONE ENCOUNTER
Darwin Almazan is [address] [facility], RN. C.diff. + was having diarrhea. VSS afeb. Was on vanco. 125 qid X 14 days finished 1-27-18. Contact precautions. Vanco. 250 qid X 2 weeks. Then taper X 6 weeks. Then 250 q12hrs. X 2 weeks. 125 q12hrs. For 4 weeks.

## 2018-02-06 NOTE — PLAN OF CARE
Patient received from [address] [facility] home in stable condition. Vital signs are stable. IV started to right forearm. Orders received from Dr. Kim Sam for 1 unit of PRBC then patient is to return to [facility]. Dinner tray ordered for patient.  Lab ca

## 2018-02-07 ENCOUNTER — SNF VISIT (OUTPATIENT)
Dept: INTERNAL MEDICINE CLINIC | Facility: SKILLED NURSING FACILITY | Age: 73
End: 2018-02-07

## 2018-02-07 DIAGNOSIS — Z09 FOLLOW UP: ICD-10-CM

## 2018-02-07 DIAGNOSIS — A04.72 C. DIFFICILE DIARRHEA: ICD-10-CM

## 2018-02-07 DIAGNOSIS — D64.9 ANEMIA, UNSPECIFIED TYPE: ICD-10-CM

## 2018-02-07 LAB — BLOOD TYPE BARCODE: 5100

## 2018-02-07 PROCEDURE — 99308 SBSQ NF CARE LOW MDM 20: CPT | Performed by: NURSE PRACTITIONER

## 2018-02-07 NOTE — TELEPHONE ENCOUNTER
RN called Pt. N and V. Feels a lot better. No abd. Pain. Not tachy nor hypotensive. Afeb. V X 1 and nonbloody. No resolved. Happened at [facility]. If more episodes, ER. Message to PCP.

## 2018-02-07 NOTE — PROGRESS NOTES
Cassie Esqueda : 1945 Age 74year old female patient is admitted to [facility] for OWEN       Chief complaint: F/U Anemia, & loose stools      The patient is a 66-year-old female with a medical history of Diabetes, Hyperlipidemia, PERRLA, EOMI, sclera anicteric, conjunctiva normal. Neck supple; FROM; no JVD.      RESPIRATORY:clear to auscultation, diminished bases, on RA today     CARDIOVASCULAR: S1, S2, regular     ABDOMEN: normal active BS+, soft, non distended, nontender    : no recs for outpatient polysomnography     - continue supplemental Oxygen as needed     - was on CPAP/BiPAP in hospital, CPM     - in-house pulmonology following     5.  Pulmonary hypertension     -with recent echo showed severe PHT, pt with sleep apnea, will

## 2018-02-07 NOTE — TELEPHONE ENCOUNTER
Niels Gray at [facility] notified of Dr. Sunny Maynard orders repeated them back with good understanding  No further questions.

## 2018-02-08 ENCOUNTER — APPOINTMENT (OUTPATIENT)
Dept: GENERAL RADIOLOGY | Facility: HOSPITAL | Age: 73
DRG: 812 | End: 2018-02-08
Attending: EMERGENCY MEDICINE
Payer: MEDICARE

## 2018-02-08 ENCOUNTER — HOSPITAL ENCOUNTER (INPATIENT)
Facility: HOSPITAL | Age: 73
LOS: 2 days | Discharge: NURSING, REHAB OR LONG-TERM CARE | DRG: 812 | End: 2018-02-10
Attending: EMERGENCY MEDICINE | Admitting: INTERNAL MEDICINE
Payer: MEDICARE

## 2018-02-08 DIAGNOSIS — D64.9 ANEMIA, UNSPECIFIED TYPE: ICD-10-CM

## 2018-02-08 DIAGNOSIS — D64.9 ANEMIA: Primary | ICD-10-CM

## 2018-02-08 DIAGNOSIS — Z85.42 HISTORY OF ENDOMETRIAL CANCER: ICD-10-CM

## 2018-02-08 PROBLEM — E87.1 HYPONATREMIA: Status: ACTIVE | Noted: 2018-02-08

## 2018-02-08 PROBLEM — E87.2 METABOLIC ACIDOSIS: Status: ACTIVE | Noted: 2018-02-08

## 2018-02-08 PROBLEM — R73.9 HYPERGLYCEMIA: Status: ACTIVE | Noted: 2018-02-08

## 2018-02-08 PROBLEM — E87.20 METABOLIC ACIDOSIS: Status: ACTIVE | Noted: 2018-02-08

## 2018-02-08 LAB
ANION GAP SERPL CALC-SCNC: 10 MMOL/L (ref 0–18)
ANTIBODY SCREEN: NEGATIVE
BASOPHILS # BLD: 0 K/UL (ref 0–0.2)
BASOPHILS NFR BLD: 1 %
BUN SERPL-MCNC: 28 MG/DL (ref 8–20)
BUN/CREAT SERPL: 25.5 (ref 10–20)
CALCIUM SERPL-MCNC: 9 MG/DL (ref 8.5–10.5)
CHLORIDE SERPL-SCNC: 105 MMOL/L (ref 95–110)
CO2 SERPL-SCNC: 20 MMOL/L (ref 22–32)
CREAT SERPL-MCNC: 1.1 MG/DL (ref 0.5–1.5)
EOSINOPHIL # BLD: 0.1 K/UL (ref 0–0.7)
EOSINOPHIL NFR BLD: 2 %
ERYTHROCYTE [DISTWIDTH] IN BLOOD BY AUTOMATED COUNT: 21.1 % (ref 11–15)
GLUCOSE BLDC GLUCOMTR-MCNC: 198 MG/DL (ref 70–99)
GLUCOSE SERPL-MCNC: 188 MG/DL (ref 70–99)
HCT VFR BLD AUTO: 17.8 % (ref 35–48)
HGB BLD-MCNC: 5.9 G/DL (ref 12–16)
LYMPHOCYTES # BLD: 0.7 K/UL (ref 1–4)
LYMPHOCYTES NFR BLD: 16 %
MCH RBC QN AUTO: 33.5 PG (ref 27–32)
MCHC RBC AUTO-ENTMCNC: 33.1 G/DL (ref 32–37)
MCV RBC AUTO: 101.2 FL (ref 80–100)
MONOCYTES # BLD: 0.3 K/UL (ref 0–1)
MONOCYTES NFR BLD: 6 %
NEUTROPHILS # BLD AUTO: 3.1 K/UL (ref 1.8–7.7)
NEUTROPHILS NFR BLD: 75 %
OSMOLALITY UR CALC.SUM OF ELEC: 290 MOSM/KG (ref 275–295)
PLATELET # BLD AUTO: 303 K/UL (ref 140–400)
PMV BLD AUTO: 7.7 FL (ref 7.4–10.3)
POTASSIUM SERPL-SCNC: 4.6 MMOL/L (ref 3.3–5.1)
RBC # BLD AUTO: 1.76 M/UL (ref 3.7–5.4)
RH BLOOD TYPE: POSITIVE
SODIUM SERPL-SCNC: 135 MMOL/L (ref 136–144)
WBC # BLD AUTO: 4.1 K/UL (ref 4–11)

## 2018-02-08 PROCEDURE — 71045 X-RAY EXAM CHEST 1 VIEW: CPT | Performed by: EMERGENCY MEDICINE

## 2018-02-08 PROCEDURE — 30233N1 TRANSFUSION OF NONAUTOLOGOUS RED BLOOD CELLS INTO PERIPHERAL VEIN, PERCUTANEOUS APPROACH: ICD-10-PCS | Performed by: EMERGENCY MEDICINE

## 2018-02-08 RX ORDER — ACETAMINOPHEN 325 MG/1
650 TABLET ORAL EVERY 6 HOURS PRN
Status: DISCONTINUED | OUTPATIENT
Start: 2018-02-08 | End: 2018-02-10

## 2018-02-08 RX ORDER — MELATONIN
325 2 TIMES DAILY
Status: DISCONTINUED | OUTPATIENT
Start: 2018-02-09 | End: 2018-02-10

## 2018-02-08 RX ORDER — GUAIFENESIN/DEXTROMETHORPHAN 100-10MG/5
10 SYRUP ORAL EVERY 6 HOURS
Status: DISCONTINUED | OUTPATIENT
Start: 2018-02-08 | End: 2018-02-10

## 2018-02-08 RX ORDER — IPRATROPIUM BROMIDE AND ALBUTEROL SULFATE 2.5; .5 MG/3ML; MG/3ML
3 SOLUTION RESPIRATORY (INHALATION) 4 TIMES DAILY
Status: DISCONTINUED | OUTPATIENT
Start: 2018-02-08 | End: 2018-02-10

## 2018-02-08 RX ORDER — IPRATROPIUM BROMIDE AND ALBUTEROL SULFATE 2.5; .5 MG/3ML; MG/3ML
3 SOLUTION RESPIRATORY (INHALATION) EVERY 4 HOURS PRN
Status: DISCONTINUED | OUTPATIENT
Start: 2018-02-08 | End: 2018-02-10

## 2018-02-08 RX ORDER — MEGESTROL ACETATE 40 MG/1
80 TABLET ORAL 4 TIMES DAILY
Status: DISCONTINUED | OUTPATIENT
Start: 2018-02-09 | End: 2018-02-10

## 2018-02-08 RX ORDER — ATORVASTATIN CALCIUM 40 MG/1
40 TABLET, FILM COATED ORAL NIGHTLY
Status: DISCONTINUED | OUTPATIENT
Start: 2018-02-08 | End: 2018-02-10

## 2018-02-08 RX ORDER — DEXTROSE MONOHYDRATE 25 G/50ML
50 INJECTION, SOLUTION INTRAVENOUS AS NEEDED
Status: DISCONTINUED | OUTPATIENT
Start: 2018-02-08 | End: 2018-02-10

## 2018-02-08 RX ORDER — PANTOPRAZOLE SODIUM 40 MG/1
40 TABLET, DELAYED RELEASE ORAL
Status: DISCONTINUED | OUTPATIENT
Start: 2018-02-09 | End: 2018-02-10

## 2018-02-08 RX ORDER — MONTELUKAST SODIUM 10 MG/1
10 TABLET ORAL NIGHTLY
Status: DISCONTINUED | OUTPATIENT
Start: 2018-02-08 | End: 2018-02-10

## 2018-02-08 RX ORDER — LISINOPRIL 10 MG/1
10 TABLET ORAL DAILY
Status: DISCONTINUED | OUTPATIENT
Start: 2018-02-09 | End: 2018-02-10

## 2018-02-08 RX ORDER — AMLODIPINE BESYLATE 10 MG/1
10 TABLET ORAL DAILY
Status: DISCONTINUED | OUTPATIENT
Start: 2018-02-09 | End: 2018-02-10

## 2018-02-08 RX ORDER — CYCLOBENZAPRINE HCL 10 MG
10 TABLET ORAL 3 TIMES DAILY PRN
Status: DISCONTINUED | OUTPATIENT
Start: 2018-02-08 | End: 2018-02-10

## 2018-02-08 RX ORDER — ALBUTEROL SULFATE 2.5 MG/3ML
2.5 SOLUTION RESPIRATORY (INHALATION) EVERY 4 HOURS PRN
Status: DISCONTINUED | OUTPATIENT
Start: 2018-02-08 | End: 2018-02-10

## 2018-02-08 NOTE — ED PROVIDER NOTES
Patient Seen in: [facility] AND [facility] Emergency Department    History   Patient presents with:  Abnormal Result (metabolic, cardiac)    Stated Complaint: Low Hgb    HPI    The patient is a 20-year-old female with a past history of COPD, hypertension, hyperl Pulse 70   Temp 98.2 °F (36.8 °C) (Oral)   Resp 18   Ht 160 cm (5' 3\")   Wt 129.7 kg   SpO2 99%   BMI 50.66 kg/m²         Physical Exam    Constitutional: Well-developed well-nourished in no acute distress  Head: Normocephalic, no swelling or tenderness Saturation 73 (*)     Transferrin 150 (*)     All other components within normal limits   C-REACTIVE PROTEIN - Abnormal; Notable for the following:     C-Reactive Protein 1.3 (*)     All other components within normal limits   POCT GLUCOSE - Abnormal; Nota        6.0                  120                     7.0                  150                     8.0                  180             100-260       9.0                  210             130-290   VITAMIN B12 - Normal   CBC WITH DIFFERE will admit the patient. He recommends transfusing the patient. He request consult to  and  from gastroenterology.      MDM           Admission disposition: 2/8/2018  6:34 PM           Disposition and Plan     Clinical Impression:  Anemia  (

## 2018-02-08 NOTE — ED INITIAL ASSESSMENT (HPI)
Patient was brought in by ems. Patient was getting radiation due to uterus cancer when she was diagnose on 11/20/17. Per patient doctor, she needs to be admitted due to low hgb and possible transfusion.

## 2018-02-09 LAB
BILIRUB UR QL: NEGATIVE
C DIFF TOX B STL QL: POSITIVE
COLOR UR: YELLOW
GLUCOSE BLDC GLUCOMTR-MCNC: 166 MG/DL (ref 70–99)
GLUCOSE BLDC GLUCOMTR-MCNC: 192 MG/DL (ref 70–99)
GLUCOSE BLDC GLUCOMTR-MCNC: 208 MG/DL (ref 70–99)
GLUCOSE UR-MCNC: NEGATIVE MG/DL
HBA1C MFR BLD: 5.3 % (ref 4–6)
HCT VFR BLD AUTO: 20 % (ref 35–48)
HCT VFR BLD AUTO: 23.7 % (ref 35–48)
HGB BLD-MCNC: 6.9 G/DL (ref 12–16)
HGB BLD-MCNC: 8.1 G/DL (ref 12–16)
KETONES UR-MCNC: NEGATIVE MG/DL
NITRITE UR QL STRIP.AUTO: NEGATIVE
PH UR: 5 [PH] (ref 5–8)
PROT UR-MCNC: NEGATIVE MG/DL
RBC #/AREA URNS AUTO: 7 /HPF
SP GR UR STRIP: 1.01 (ref 1–1.03)
UROBILINOGEN UR STRIP-ACNC: <2
VIT C UR-MCNC: NEGATIVE MG/DL
WBC #/AREA URNS AUTO: 455 /HPF

## 2018-02-09 PROCEDURE — 99222 1ST HOSP IP/OBS MODERATE 55: CPT | Performed by: INTERNAL MEDICINE

## 2018-02-09 PROCEDURE — 99223 1ST HOSP IP/OBS HIGH 75: CPT | Performed by: INTERNAL MEDICINE

## 2018-02-09 RX ORDER — SODIUM CHLORIDE 9 MG/ML
INJECTION, SOLUTION INTRAVENOUS ONCE
Status: COMPLETED | OUTPATIENT
Start: 2018-02-09 | End: 2018-02-09

## 2018-02-09 RX ORDER — SODIUM CHLORIDE 0.9 % (FLUSH) 0.9 %
10 SYRINGE (ML) INJECTION AS NEEDED
Status: DISCONTINUED | OUTPATIENT
Start: 2018-02-09 | End: 2018-02-10

## 2018-02-09 RX ORDER — SODIUM CHLORIDE 9 MG/ML
INJECTION, SOLUTION INTRAVENOUS
Status: COMPLETED
Start: 2018-02-09 | End: 2018-02-09

## 2018-02-09 NOTE — CM/SW NOTE
Pt is from [facility]. SW asked KOFI/Corazon to send clinical updates to [facility]. SW messaged Jan/[facility] to confirm.      Haydee Summers, [address]

## 2018-02-09 NOTE — CONSULTS
[facility]    PATIENT'S NAME: William Kilo   ATTENDING PHYSICIAN: Jannette Lema MD   CONSULTING PHYSICIAN: Taqueria Sargent.  Samuel Camarena MD   PATIENT ACCOUNT#:   [id number]    LOCATION:  [address] [place] [address] RECORD #:   [id number]       DATE OF BIRTH:  [birth year] 303,000 and white blood cell count 4000. She has been transfused 2 units of packed red cells and her hemoglobin is now 6.9.      PAST MEDICAL HISTORY:  Stage I endometrial cancer as per above, diabetes mellitus, morbid obesity, COPD, fibromyalgia, hyperlip transfusion-dependent anemia. Initially, she was iron deficient; however, more recently she is macrocytic with adequate iron stores and markedly elevated ferritin (greater than 7000).   She has responded somewhat poorly to transfusions, and we have ruled o

## 2018-02-09 NOTE — CONSULTS
GI CONSULTATION:  Available medical records reviewed. Patient interviewed and examined. Please see orders and transcription. ( Dictated # [id number] ). Will discuss with Dr. Manolo Sheets . Thank you.

## 2018-02-09 NOTE — PLAN OF CARE
Problem: Diabetes/Glucose Control  Goal: Glucose maintained within prescribed range  INTERVENTIONS:  - Monitor Blood Glucose as ordered  - Assess for signs and symptoms of hyperglycemia and hypoglycemia  - Administer ordered medications to maintain glucose products as ordered by the doctor.     Problem: INFECTION  Goal: No evidence of infection  Interventions:  - Monitor for symptoms of infection  - Monitor surgical sites and insertion sites for all indwelling lines, tubes and drains for drainage, redness or delivery of care  - Encourage patient/family to participate in care and decision-making at the level they choose  - Honor patient and family perspectives and choices   Outcome: Progressing  (1) Patient is provided with patience, privacy, and respect at all

## 2018-02-09 NOTE — CONSULTS
[facility]    PATIENT'S NAME: Cassi Bachelor   ATTENDING PHYSICIAN: Duane Chimes, MD   CONSULTING PHYSICIAN: Ashish Villarreal MD   PATIENT ACCOUNT#:   [de-identified]    LOCATION:  [address] [place] [address] #:   [id number]       [address] hemoglobins were a little bit higher at 8.8 and 9.3, but bouncing down to as low as the 5 range and the 4 range. The patient denies rectal bleeding.   She states she does not normally look at her stool, but her caregivers do; and they have not noticed any for shortness of breath, recent lower extremity edema greatly improved, diarrhea resolved. All other review of systems are negative except as stated above.       PHYSICAL EXAMINATION:    GENERAL:  An elderly female, moderately pale; alert and oriented, min not appear to be iron-deficient. There was no active GI bleeding. I would not perform endoscopic evaluation at this time given her ongoing medical problems.   She does have Clostridium difficile and may have had some irritation in the colon or rectum due

## 2018-02-09 NOTE — PLAN OF CARE
Problem: Diabetes/Glucose Control  Goal: Glucose maintained within prescribed range  INTERVENTIONS:  - Monitor Blood Glucose as ordered  - Assess for signs and symptoms of hyperglycemia and hypoglycemia  - Administer ordered medications to maintain glucose needs  - Identify cognitive and physical deficits and behaviors that affect risk of falls.   - [place] fall precautions as indicated by assessment.  - Educate pt/family on patient safety including physical limitations  - Instruct pt to call for assistance

## 2018-02-09 NOTE — PLAN OF CARE
Dr Spring Love paged regarding critical value hemoglobin 6.9 this AM. Stated no new orders at this time, wait until he sees this patient today.

## 2018-02-09 NOTE — PLAN OF CARE
Received a call from Dr Royal Horton this AM to let GI know about hemoglobin 6.9. Dr Rossi Lynch making rounds this AM, notified of hemoglobin. She stated no new orders at this time, wait until she sees the patient.

## 2018-02-09 NOTE — H&P
[facility]    History and Physical    Julia Robles Patient Status:  Inpatient    1945 MRN [id number]   Location [facility] 5SW/SE Attending Kaitlynn Shepherd MD   Hosp Day # 1 PCP Emily Aguiar MD     Date:  2018  Date of Ad Allergies    Prescriptions Prior to Admission:  vancomycin 50 MG/ML Oral Solution Take 250 mg by mouth every 6 (six) hours.    Pantoprazole Sodium 40 MG Oral Tab EC Take 1 tablet (40 mg total) by mouth every morning before breakfast.   Ferrous Sulfate 325 ( nausea. Endocrine: Negative. Genitourinary: Negative. Musculoskeletal: Negative. Skin: Negative. Neurological: Positive for weakness. Psychiatric/Behavioral: Negative.         Physical Exam:   Vital Signs:  Blood pressure 135/49, pulse 80, t initial encounter- stable, pt/ot         Essential hypertension- well controlled d              C. difficile diarrhea- will resend the stool         ua done in ER looks dirty,will await cx       History of endometrial cancer  On radiation therapy at [facility]

## 2018-02-10 VITALS
DIASTOLIC BLOOD PRESSURE: 52 MMHG | HEIGHT: 63 IN | OXYGEN SATURATION: 99 % | HEART RATE: 70 BPM | WEIGHT: 286 LBS | SYSTOLIC BLOOD PRESSURE: 138 MMHG | TEMPERATURE: 98 F | BODY MASS INDEX: 50.68 KG/M2 | RESPIRATION RATE: 18 BRPM

## 2018-02-10 LAB
BASOPHILS # BLD: 0 K/UL (ref 0–0.2)
BASOPHILS NFR BLD: 0 %
BLOOD TYPE BARCODE: 5100
BLOOD TYPE BARCODE: 5100
CRP SERPL-MCNC: 1.3 MG/DL (ref 0–0.9)
EOSINOPHIL # BLD: 0.1 K/UL (ref 0–0.7)
EOSINOPHIL NFR BLD: 3 %
ERYTHROCYTE [DISTWIDTH] IN BLOOD BY AUTOMATED COUNT: 18.4 % (ref 11–15)
FERRITIN SERPL IA-MCNC: 1383 NG/ML (ref 11–307)
FOLATE SERPL-MCNC: 9.2 NG/ML
GLUCOSE BLDC GLUCOMTR-MCNC: 165 MG/DL (ref 70–99)
GLUCOSE BLDC GLUCOMTR-MCNC: 177 MG/DL (ref 70–99)
HCT VFR BLD AUTO: 22.8 % (ref 35–48)
HGB BLD-MCNC: 7.7 G/DL (ref 12–16)
IRON SATN MFR SERPL: 73 % (ref 15–50)
IRON SERPL-MCNC: 144 MCG/DL (ref 28–170)
LYMPHOCYTES # BLD: 0.7 K/UL (ref 1–4)
LYMPHOCYTES NFR BLD: 17 %
MCH RBC QN AUTO: 32.8 PG (ref 27–32)
MCHC RBC AUTO-ENTMCNC: 33.8 G/DL (ref 32–37)
MCV RBC AUTO: 97.1 FL (ref 80–100)
MONOCYTES # BLD: 0.3 K/UL (ref 0–1)
MONOCYTES NFR BLD: 7 %
NEUTROPHILS # BLD AUTO: 3.1 K/UL (ref 1.8–7.7)
NEUTROPHILS NFR BLD: 73 %
PLATELET # BLD AUTO: 249 K/UL (ref 140–400)
PMV BLD AUTO: 7 FL (ref 7.4–10.3)
RBC # BLD AUTO: 2.34 M/UL (ref 3.7–5.4)
TIBC SERPL-MCNC: 198 MCG/DL (ref 228–428)
TRANSFERRIN SERPL-MCNC: 150 MG/DL (ref 192–382)
VIT B12 SERPL-MCNC: 247 PG/ML (ref 181–914)
WBC # BLD AUTO: 4.2 K/UL (ref 4–11)

## 2018-02-10 PROCEDURE — 99232 SBSQ HOSP IP/OBS MODERATE 35: CPT | Performed by: INTERNAL MEDICINE

## 2018-02-10 PROCEDURE — 99238 HOSP IP/OBS DSCHRG MGMT 30/<: CPT | Performed by: INTERNAL MEDICINE

## 2018-02-10 RX ORDER — UBIDECARENONE 75 MG
100 CAPSULE ORAL DAILY
Status: DISCONTINUED | OUTPATIENT
Start: 2018-02-10 | End: 2018-02-10

## 2018-02-10 RX ORDER — IPRATROPIUM BROMIDE AND ALBUTEROL SULFATE 2.5; .5 MG/3ML; MG/3ML
3 SOLUTION RESPIRATORY (INHALATION)
Status: DISCONTINUED | OUTPATIENT
Start: 2018-02-10 | End: 2018-02-10

## 2018-02-10 RX ORDER — CYANOCOBALAMIN 1000 UG/ML
1000 INJECTION INTRAMUSCULAR; SUBCUTANEOUS ONCE
Status: COMPLETED | OUTPATIENT
Start: 2018-02-10 | End: 2018-02-10

## 2018-02-10 NOTE — DISCHARGE SUMMARY
DISCHARGE SUMMARY     Vinh Duffy Patient Status:  Inpatient    1945 MRN [id number]   Location [facility] 5SW/SE Attending Day Nam MD   Hosp Day # 2 PCP Jennifer Cabrera MD     Date of Admission: 2018  Date of Discharge: 2/10/2018 Essential hypertension- well controlled continue with current meds                  C. difficile diarrhea-on po vanco                  History of endometrial cancer  On radiation therapy at [address]         Discharge Physical Exam: Constitu into the skin 4 (four) times daily before meals and nightly. Refills:  0     ipratropium-albuterol 0.5-2.5 (3) MG/3ML Soln  Commonly known as:  DUONEB      Take 3 mL by nebulization every 4 (four) hours as needed (shortness of breath).    Refills:  0 Oral Tab  Take 40 mg by mouth nightly. Cyclobenzaprine HCl 10 MG Oral Tab  Take 10 mg by mouth 3 (three) times daily as needed for Muscle spasms.     !! ipratropium-albuterol 0.5-2.5 (3) MG/3ML Inhalation Solution  Take 3 mL by nebulization every 4 (four

## 2018-02-10 NOTE — PLAN OF CARE
Diabetes/Glucose Control    • Glucose maintained within prescribed range Adequate for Discharge        [facility]    • Maintain hematologic stability Adequate for Discharge        INFECTION    • No evidence of infection Adequate for Discharge        Nazia

## 2018-02-10 NOTE — PLAN OF CARE
Notified Dr. Jay Mckeon regarding patient stool results were positive for C-diff. Patient currently on Enteric contact precautions per protocol, and receives oral vancomycin as previously ordered as scheduled. No further orders obtained.

## 2018-02-10 NOTE — CM/SW NOTE
CTL notified patient can return to [facility] today. KANA ok'd with [facility] for 3 pm today. RN to Rn report is [phone]. Per RN patient is bed bound and unable to sit in a chair.  [facility] ambulance ordered for 3pm [phone]  RN to notify patient and family    Dc

## 2018-02-10 NOTE — PROGRESS NOTES
[facility] [address]     Gastroenterology Progress Note    [facility] Patient Status:  Inpatient    1945 MRN [id number]   Location [facility] 5SW/SE Attending John Tang MD   Hosp Day # 2 PCP Aubrey Dubon MD       Assess 1 month. Subjective:     Constitutional: Positive for fatigue. Negative for activity change, appetite change, chills, diaphoresis, fever and unexpected weight change. Patient complains of fatigue but otherwise nothing new.   Baseline shortness 98.3 °F (36.8 °C) Axillary 73 18 100 %   02/09/18 1417 142/39 97.6 °F (36.4 °C) Oral 72 18 94 %   02/09/18 1338 141/40 97.8 °F (36.6 °C) Oral 75 18 96 %   02/09/18 1238 130/43 98 °F (36.7 °C) Oral 72 18 96 %       Body mass index is 50.66 kg/m².     Physica normal. Judgment and thought content normal.       Results:     Recent Labs   Lab  02/08/18   1626  02/09/18   0612  02/09/18   1846  02/10/18   0645   RBC  1.76*   --    --   2.34*   HGB  5.9*  6.9*  8.1*  7.7*   HCT  17.8*  20.0*  23.7*  22.8*   MCV  101

## 2018-02-10 NOTE — PLAN OF CARE
Notified Dr. Tamy Darden regarding patient post H&H of 8.1/23.7 after blood transfusion. H&H in am. No further orders obtained.

## 2018-02-10 NOTE — PROGRESS NOTES
[facility] - [facility]    Hematology/Oncology   Progress Note    Jaron Billings Patient Status:  Inpatient    1945 MRN [id number]   Location [facility] 5SW/SE Attending Theodora Rodgers MD   Hosp Day # 2 PCP Levy Dwyer MD     Subjective previously. --will be at [facility] daily through March for radiation. She has hematology eval with Dr. Lakia Villatoro there (she states this is on 2/12).  Likely has component of anemia of  inflammation (previous low/normal iron saturation and markedly

## 2018-02-13 PROCEDURE — 99308 SBSQ NF CARE LOW MDM 20: CPT | Performed by: INTERNAL MEDICINE

## 2018-02-14 ENCOUNTER — SNF VISIT (OUTPATIENT)
Dept: INTERNAL MEDICINE CLINIC | Facility: SKILLED NURSING FACILITY | Age: 73
End: 2018-02-14

## 2018-02-14 DIAGNOSIS — A04.72 C. DIFFICILE DIARRHEA: ICD-10-CM

## 2018-02-14 DIAGNOSIS — D64.9 ANEMIA, UNSPECIFIED TYPE: ICD-10-CM

## 2018-02-14 DIAGNOSIS — Z09 FOLLOW UP: ICD-10-CM

## 2018-02-14 PROCEDURE — 99310 SBSQ NF CARE HIGH MDM 45: CPT | Performed by: NURSE PRACTITIONER

## 2018-02-14 NOTE — PROGRESS NOTES
Benny Pleitezmilagro : 1945 Age 74year old female patient is admitted to [facility] for OWEN       Chief complaint:Initial NP Assessment/Follow up Anemia    HPI   Patient is a 75 yo female  a medical history of Diabetes, Hyperlipidemia, moist, PERRLA, EOMI, sclera anicteric, conjunctiva normal. Neck supple; FROM; no JVD.      RESPIRATORY:clear to auscultation, diminished bases, on RA today     CARDIOVASCULAR: S1, S2, regular     ABDOMEN: normal active BS+, soft, non distended, nontender pulmonology     - seen by Dr. David Treviño inpatient w/ recs for outpatient polysomnography     - continue supplemental Oxygen as needed     - was on CPAP/BiPAP in hospital, CPM     - in-house pulmonology following       5.  Pulmonary hypertension     -with rece

## 2018-02-20 ENCOUNTER — TELEPHONE (OUTPATIENT)
Dept: INTERNAL MEDICINE CLINIC | Facility: CLINIC | Age: 73
End: 2018-02-20

## 2018-02-20 PROCEDURE — 99308 SBSQ NF CARE LOW MDM 20: CPT | Performed by: INTERNAL MEDICINE

## 2018-02-21 NOTE — TELEPHONE ENCOUNTER
Can do Bactrim 1 double strength tablet twice a day for 5 days. Check to make sure she does not have a fever. If they can do a UA and send out culture on her.

## 2018-02-27 PROCEDURE — 99308 SBSQ NF CARE LOW MDM 20: CPT | Performed by: INTERNAL MEDICINE

## 2018-03-01 PROCEDURE — 99308 SBSQ NF CARE LOW MDM 20: CPT | Performed by: INTERNAL MEDICINE

## 2018-03-02 ENCOUNTER — SNF/IP PROF CHARGE ONLY (OUTPATIENT)
Dept: INTERNAL MEDICINE CLINIC | Facility: CLINIC | Age: 73
End: 2018-03-02

## 2018-03-02 DIAGNOSIS — E11.9 TYPE 2 DIABETES MELLITUS WITHOUT COMPLICATION, UNSPECIFIED LONG TERM INSULIN USE STATUS: Chronic | ICD-10-CM

## 2018-03-02 DIAGNOSIS — E11.8 TYPE 2 DIABETES MELLITUS WITH COMPLICATION, WITHOUT LONG-TERM CURRENT USE OF INSULIN (HCC): ICD-10-CM

## 2018-03-02 DIAGNOSIS — D64.9 ANEMIA, UNSPECIFIED TYPE: ICD-10-CM

## 2018-03-02 DIAGNOSIS — I50.31 ACUTE DIASTOLIC CONGESTIVE HEART FAILURE (HCC): ICD-10-CM

## 2018-03-02 DIAGNOSIS — R29.6 RECURRENT FALLS: ICD-10-CM

## 2018-03-02 DIAGNOSIS — Z85.42 HISTORY OF ENDOMETRIAL CANCER: ICD-10-CM

## 2018-03-02 DIAGNOSIS — S92.902A FOOT FRACTURE, LEFT, CLOSED, INITIAL ENCOUNTER: ICD-10-CM

## 2018-03-02 DIAGNOSIS — I27.20 PULMONARY HTN (HCC): ICD-10-CM

## 2018-03-02 DIAGNOSIS — D50.0 IRON DEFICIENCY ANEMIA DUE TO CHRONIC BLOOD LOSS: Chronic | ICD-10-CM

## 2018-03-02 DIAGNOSIS — I10 ESSENTIAL HYPERTENSION: ICD-10-CM

## 2018-03-02 DIAGNOSIS — E66.01 MORBID OBESITY (HCC): ICD-10-CM

## 2018-03-02 DIAGNOSIS — N30.00 ACUTE CYSTITIS WITHOUT HEMATURIA: ICD-10-CM

## 2018-03-02 DIAGNOSIS — C54.1 ENDOMETRIAL CANCER (HCC): ICD-10-CM

## 2018-03-02 DIAGNOSIS — N95.0 POSTMENOPAUSAL BLEEDING: ICD-10-CM

## 2018-03-02 DIAGNOSIS — A04.72 C. DIFFICILE DIARRHEA: ICD-10-CM

## 2018-03-02 DIAGNOSIS — I27.20 PULMONARY HYPERTENSION, MODERATE TO SEVERE (HCC): ICD-10-CM

## 2018-03-02 DIAGNOSIS — S92.345D CLOSED NONDISPLACED FRACTURE OF FOURTH METATARSAL BONE OF LEFT FOOT WITH ROUTINE HEALING: Chronic | ICD-10-CM

## 2018-03-05 ENCOUNTER — SNF VISIT (OUTPATIENT)
Dept: INTERNAL MEDICINE CLINIC | Facility: SKILLED NURSING FACILITY | Age: 73
End: 2018-03-05

## 2018-03-05 DIAGNOSIS — N30.00 ACUTE CYSTITIS WITHOUT HEMATURIA: ICD-10-CM

## 2018-03-05 DIAGNOSIS — D64.9 ANEMIA, UNSPECIFIED TYPE: ICD-10-CM

## 2018-03-05 DIAGNOSIS — R19.7 DIARRHEA, UNSPECIFIED TYPE: ICD-10-CM

## 2018-03-05 DIAGNOSIS — R11.2 NAUSEA AND VOMITING, INTRACTABILITY OF VOMITING NOT SPECIFIED, UNSPECIFIED VOMITING TYPE: ICD-10-CM

## 2018-03-05 PROCEDURE — 99309 SBSQ NF CARE MODERATE MDM 30: CPT | Performed by: CLINICAL NURSE SPECIALIST

## 2018-03-05 NOTE — PROGRESS NOTES
Note Text: Radha Galinavanessa : 1945 Age 74year old female patient is admitted to [facility] AND [facility] for OWEN         Chief complaint: Follow up Anemia, UTI, diarrhea, radiation treatment         HPI     Patient is a 75 yo female a medical hi Code         CURRENT MEDICATIONS: Reviewed on [facility] EMR         VITALS: WNL and afebrile         LABS/Imaging: Reviewed. Reviewed         REVIEW OF SYSTEMS: AOX3, Denies SOB,CP or palpitations. + cough. Denies hematuria/dysuria/frequency/hematochzia.  Denies 2.Endometrial cancer     - s/p D&C on 11/28     - Uterine biopsy suggestive of Grade 1 endometrial cancer     -Seen by Dr. Vanessa Huston and thought she wasn't a surgical candidate so referred her ot Radiation/Oncology     - Seen by Dr. Rosita Martines on 12/18 & 1 retested on 2/26 2/2 diarrhea and negative for C. Diff     - monitor stools , retest for C. Diff        11. UTI     - + klebsiella pneumoniae     - completed bactrim 2/26         12.  Diarrhea     - monitor closely     - will consider adding immodium if con

## 2018-03-11 ENCOUNTER — SNF/IP PROF CHARGE ONLY (OUTPATIENT)
Dept: INTERNAL MEDICINE CLINIC | Facility: CLINIC | Age: 73
End: 2018-03-11

## 2018-03-11 DIAGNOSIS — D50.0 IRON DEFICIENCY ANEMIA DUE TO CHRONIC BLOOD LOSS: Chronic | ICD-10-CM

## 2018-03-11 DIAGNOSIS — I27.20 PULMONARY HTN (HCC): ICD-10-CM

## 2018-03-11 DIAGNOSIS — E66.01 MORBID OBESITY (HCC): ICD-10-CM

## 2018-03-11 DIAGNOSIS — R29.6 RECURRENT FALLS: ICD-10-CM

## 2018-03-11 DIAGNOSIS — E11.9 TYPE 2 DIABETES MELLITUS WITHOUT COMPLICATION, UNSPECIFIED LONG TERM INSULIN USE STATUS: Chronic | ICD-10-CM

## 2018-03-11 DIAGNOSIS — Z85.42 HISTORY OF ENDOMETRIAL CANCER: ICD-10-CM

## 2018-03-11 DIAGNOSIS — D64.9 ANEMIA, UNSPECIFIED TYPE: ICD-10-CM

## 2018-03-11 DIAGNOSIS — I10 ESSENTIAL HYPERTENSION: ICD-10-CM

## 2018-03-11 DIAGNOSIS — S92.345D CLOSED NONDISPLACED FRACTURE OF FOURTH METATARSAL BONE OF LEFT FOOT WITH ROUTINE HEALING: Chronic | ICD-10-CM

## 2018-03-11 DIAGNOSIS — E11.8 TYPE 2 DIABETES MELLITUS WITH COMPLICATION, WITHOUT LONG-TERM CURRENT USE OF INSULIN (HCC): ICD-10-CM

## 2018-03-11 DIAGNOSIS — C54.1 ENDOMETRIAL CANCER (HCC): ICD-10-CM

## 2018-03-15 PROCEDURE — 99308 SBSQ NF CARE LOW MDM 20: CPT | Performed by: INTERNAL MEDICINE

## 2018-03-16 ENCOUNTER — SNF VISIT (OUTPATIENT)
Dept: INTERNAL MEDICINE CLINIC | Facility: SKILLED NURSING FACILITY | Age: 73
End: 2018-03-16

## 2018-03-16 DIAGNOSIS — A04.72 C. DIFFICILE DIARRHEA: ICD-10-CM

## 2018-03-16 DIAGNOSIS — Z09 FOLLOW UP: ICD-10-CM

## 2018-03-16 DIAGNOSIS — I82.412 DEEP VEIN THROMBOSIS (DVT) OF FEMORAL VEIN OF LEFT LOWER EXTREMITY, UNSPECIFIED CHRONICITY (HCC): ICD-10-CM

## 2018-03-16 PROCEDURE — 99310 SBSQ NF CARE HIGH MDM 45: CPT | Performed by: NURSE PRACTITIONER

## 2018-03-16 NOTE — PROGRESS NOTES
Note Text: [facility] : 1945 Age 74year old female patient is admitted to [facility] for [facility]EN         Chief complaint: Initial Assessment and follow up Left femoral DVT        HPI   Patient is a 69-year female with  medical hi No Known Allergies         CODE STATUS: Full Code         CURRENT MEDICATIONS: Reviewed on [facility] EMR         VITALS: WNL and afebrile         LABS/Imaging: Reviewed. Reviewed         REVIEW OF SYSTEMS:   AOX3, Denies SOB,CP or palpitations. Denies cough.  [facility]Oncology     - Seen by Dr. Alfie Levine on 12/18 & 1/15.     - Pelvic MRI done @ [facility] & seen by Dr. Duyen Kan 1/15.  -Unsuccessful tandem placement during adm on 3/12, device removed and plan is to attempt procedure again on week of 3/19          3.

## 2018-03-18 ENCOUNTER — SNF/IP PROF CHARGE ONLY (OUTPATIENT)
Dept: INTERNAL MEDICINE CLINIC | Facility: CLINIC | Age: 73
End: 2018-03-18

## 2018-03-18 DIAGNOSIS — I10 ESSENTIAL HYPERTENSION: ICD-10-CM

## 2018-03-18 DIAGNOSIS — C54.1 ENDOMETRIAL CANCER (HCC): ICD-10-CM

## 2018-03-18 DIAGNOSIS — E11.8 TYPE 2 DIABETES MELLITUS WITH COMPLICATION, WITHOUT LONG-TERM CURRENT USE OF INSULIN (HCC): ICD-10-CM

## 2018-03-18 DIAGNOSIS — A04.72 C. DIFFICILE DIARRHEA: ICD-10-CM

## 2018-03-18 DIAGNOSIS — D64.9 ANEMIA, UNSPECIFIED TYPE: ICD-10-CM

## 2018-03-18 DIAGNOSIS — D50.0 IRON DEFICIENCY ANEMIA DUE TO CHRONIC BLOOD LOSS: Chronic | ICD-10-CM

## 2018-03-26 ENCOUNTER — SNF VISIT (OUTPATIENT)
Dept: INTERNAL MEDICINE CLINIC | Facility: SKILLED NURSING FACILITY | Age: 73
End: 2018-03-26

## 2018-03-26 DIAGNOSIS — I82.412 ACUTE DEEP VEIN THROMBOSIS (DVT) OF FEMORAL VEIN OF LEFT LOWER EXTREMITY (HCC): ICD-10-CM

## 2018-03-26 DIAGNOSIS — C54.1 ENDOMETRIAL CANCER (HCC): ICD-10-CM

## 2018-03-26 DIAGNOSIS — A04.72 C. DIFFICILE DIARRHEA: ICD-10-CM

## 2018-03-26 DIAGNOSIS — I10 ESSENTIAL HYPERTENSION: ICD-10-CM

## 2018-03-26 DIAGNOSIS — E11.9 TYPE 2 DIABETES MELLITUS WITHOUT COMPLICATION, UNSPECIFIED WHETHER LONG TERM INSULIN USE (HCC): Chronic | ICD-10-CM

## 2018-03-26 PROCEDURE — 99310 SBSQ NF CARE HIGH MDM 45: CPT | Performed by: CLINICAL NURSE SPECIALIST

## 2018-03-26 RX ORDER — POLYETHYLENE GLYCOL 3350 17 G/17G
17 POWDER, FOR SOLUTION ORAL 2 TIMES DAILY
COMMUNITY
End: 2018-04-09

## 2018-03-26 RX ORDER — GLIMEPIRIDE 2 MG/1
2 TABLET ORAL
COMMUNITY
End: 2018-09-13

## 2018-03-26 RX ORDER — HYDRALAZINE HYDROCHLORIDE 25 MG/1
100 TABLET, FILM COATED ORAL EVERY 8 HOURS
COMMUNITY
End: 2018-09-13

## 2018-03-26 RX ORDER — DOCUSATE SODIUM 100 MG/1
100 CAPSULE, LIQUID FILLED ORAL 2 TIMES DAILY
COMMUNITY
End: 2018-04-09

## 2018-03-26 RX ORDER — ENOXAPARIN SODIUM 150 MG/ML
120 INJECTION SUBCUTANEOUS EVERY 12 HOURS
COMMUNITY
End: 2018-11-08

## 2018-03-26 RX ORDER — HYDROCODONE BITARTRATE AND ACETAMINOPHEN 5; 325 MG/1; MG/1
2 TABLET ORAL EVERY 6 HOURS PRN
COMMUNITY
End: 2018-04-09

## 2018-03-26 RX ORDER — ISOSORBIDE DINITRATE 20 MG/1
20 TABLET ORAL 3 TIMES DAILY
COMMUNITY
End: 2018-07-26

## 2018-03-26 RX ORDER — FUROSEMIDE 40 MG/1
20 TABLET ORAL 2 TIMES DAILY
COMMUNITY
End: 2018-11-08

## 2018-03-26 NOTE — PROGRESS NOTES
Florencemilagro Salma : 1945 Age 74year old female patient is admitted to [facility] for OWEN         Chief complaint: Initial APRN Assessment and follow up implanted tandem and Ovoid procedure, DVT, C-diff    Initial HPI:    Pt diagnose Anuel for a planned procedure. Patient underwent an insertion of tandem and ovoid for brachytherapy. Patient recieved Brachytherapy  2x a day for a total of 6 sessions.  Patients hemoglobin was 6.9 and had one unit of pRBC on 3/19 and two units pRBC on 3/2 2/2 chronic blood loss and endometrial ca     - postmenopausal bleeding x 3 years     - Seeing Dr. Damon Ireland at [facility].      - completed Radiation M-F daily     - implanted tandem and ovid treatment 3/19-3/22    - CPM ferrous sulfate 650mg PO BID Atorvastatin Daily         10. Morbid obesity     -problem with moving, and getting up     - continue PT/OT         10. C.  Diff     -continue vanco 250mg q6 hrs until 3/27            11.Hypercapnic respiratory failure     - likely due to ASHLEY per pulmonology

## 2018-03-28 ENCOUNTER — SNF VISIT (OUTPATIENT)
Dept: INTERNAL MEDICINE CLINIC | Facility: SKILLED NURSING FACILITY | Age: 73
End: 2018-03-28

## 2018-03-28 DIAGNOSIS — D64.9 ANEMIA, UNSPECIFIED TYPE: ICD-10-CM

## 2018-03-28 PROCEDURE — 99310 SBSQ NF CARE HIGH MDM 45: CPT | Performed by: NURSE PRACTITIONER

## 2018-03-28 NOTE — PROGRESS NOTES
Antoniogreta Allyson : 1945 Age 74year old female patient is admitted to [facility] AND [facility] for OWEN         Chief complaint: Low hemoglobin    Initial HPI:    Pt diagnosed with stage I endometrial cancer in 2017 and is being followed and ovoid for brachytherapy. Patient recieved Brachytherapy  2x a day for a total of 6 sessions. Patients hemoglobin was 6.9 and had one unit of pRBC on 3/19 and two units pRBC on 3/20.  Patient discharged from hospital when medically stable on 3/22 and sen blood loss and endometrial ca     - postmenopausal bleeding x 3 years     - Seeing Dr. Ileana Elder at [facility].      - completed Radiation M-F daily     - implanted tandem and ovid treatment 3/19-3/22    - CPM ferrous sulfate 650mg PO BID     -On Panto AC TID     - Hgb A1c 5.1 12/1         9. Hyperlipidemia     -CPM Atorvastatin Daily         10. Morbid obesity     -problem with moving, and getting up     - continue PT/OT         10. C.  Diff     -Just completed vanco 3/27  -Had had more than two formed st

## 2018-03-29 ENCOUNTER — TELEPHONE (OUTPATIENT)
Dept: INTERNAL MEDICINE CLINIC | Facility: CLINIC | Age: 73
End: 2018-03-29

## 2018-03-30 ENCOUNTER — SNF VISIT (OUTPATIENT)
Dept: INTERNAL MEDICINE CLINIC | Facility: SKILLED NURSING FACILITY | Age: 73
End: 2018-03-30

## 2018-03-30 DIAGNOSIS — R23.4 INDURATION AT INJECTION SITE: ICD-10-CM

## 2018-03-30 DIAGNOSIS — D64.9 ANEMIA, UNSPECIFIED TYPE: ICD-10-CM

## 2018-03-30 DIAGNOSIS — E66.01 MORBID OBESITY (HCC): ICD-10-CM

## 2018-03-30 PROCEDURE — 99310 SBSQ NF CARE HIGH MDM 45: CPT | Performed by: NURSE PRACTITIONER

## 2018-03-30 NOTE — TELEPHONE ENCOUNTER
Ian Taylor called from [phone] readmission to [facility]. Will assign to another MD due to fact Dr. Merry Bernardo not here.

## 2018-03-30 NOTE — PROGRESS NOTES
Jimmy An : 1945 Age 74year old female patient is admitted to [facility] for [facility]EN       Chief complaint: Initial NP Assessment/Follow up post blood transfusion at [facility] and c/o indurations in abdomen     HPI:   Patient is a From lovenox injections. RLQ Abdomen with dense texture upon assessent,nontender, no open ulcers and with slight warmth, no erythema-?cellulitis? . Will follow up on Monday, luis angel etienne for the next couple of days.  Denies fever or chills and states she is d repeat CBC 4/2, patient is stable and no active bleeding at present. LLQ Abdomen  with bruise from lovenox injections. About four small size induration noted on abdomen and marked to be followed up on Monday, ? From lovenox injections.  RLQ Abdomen with by  podiatrist 12/14, with repeat portable XR 12/21: WB as tolerated, pt started PT     - continue acetaminophen PRN         7. HTN     - continue amlodipine     - continue hydralazine     - continue Isorsobide at 20mg TID.          8. DM type II     - co

## 2018-04-02 PROCEDURE — 99305 1ST NF CARE MODERATE MDM 35: CPT | Performed by: INTERNAL MEDICINE

## 2018-04-03 ENCOUNTER — TELEPHONE (OUTPATIENT)
Dept: INTERNAL MEDICINE CLINIC | Facility: CLINIC | Age: 73
End: 2018-04-03

## 2018-04-03 NOTE — TELEPHONE ENCOUNTER
Heena Thomas from [place]. Got admitted for blood transfusion at [facility]. Hgb at 5. Going back to [facility]. Message to PCP.

## 2018-04-03 NOTE — TELEPHONE ENCOUNTER
Spoke with daughter all questins answered, pt will be discussing with Hematology at [facility] about her Moms anemia

## 2018-04-03 NOTE — TELEPHONE ENCOUNTER
Patients daughter Brady Guerrero called requesting to speak to you in regards to patients condition.   [phone]

## 2018-04-06 ENCOUNTER — SNF/IP PROF CHARGE ONLY (OUTPATIENT)
Dept: INTERNAL MEDICINE CLINIC | Facility: CLINIC | Age: 73
End: 2018-04-06

## 2018-04-06 DIAGNOSIS — I50.31 ACUTE DIASTOLIC CONGESTIVE HEART FAILURE (HCC): ICD-10-CM

## 2018-04-06 DIAGNOSIS — C54.1 ENDOMETRIAL CANCER (HCC): ICD-10-CM

## 2018-04-06 DIAGNOSIS — D50.0 IRON DEFICIENCY ANEMIA DUE TO CHRONIC BLOOD LOSS: Chronic | ICD-10-CM

## 2018-04-06 DIAGNOSIS — E66.01 MORBID OBESITY (HCC): ICD-10-CM

## 2018-04-06 DIAGNOSIS — I27.20 PULMONARY HTN (HCC): ICD-10-CM

## 2018-04-06 DIAGNOSIS — I10 ESSENTIAL HYPERTENSION: ICD-10-CM

## 2018-04-09 ENCOUNTER — SNF VISIT (OUTPATIENT)
Dept: INTERNAL MEDICINE CLINIC | Facility: SKILLED NURSING FACILITY | Age: 73
End: 2018-04-09

## 2018-04-09 DIAGNOSIS — I82.412 ACUTE DEEP VEIN THROMBOSIS (DVT) OF FEMORAL VEIN OF LEFT LOWER EXTREMITY (HCC): ICD-10-CM

## 2018-04-09 DIAGNOSIS — E66.01 MORBID OBESITY (HCC): ICD-10-CM

## 2018-04-09 DIAGNOSIS — A04.72 C. DIFFICILE DIARRHEA: ICD-10-CM

## 2018-04-09 DIAGNOSIS — I10 ESSENTIAL HYPERTENSION: ICD-10-CM

## 2018-04-09 DIAGNOSIS — C54.1 ENDOMETRIAL CANCER (HCC): ICD-10-CM

## 2018-04-09 DIAGNOSIS — D64.9 ANEMIA, UNSPECIFIED TYPE: ICD-10-CM

## 2018-04-09 PROCEDURE — 99310 SBSQ NF CARE HIGH MDM 45: CPT | Performed by: CLINICAL NURSE SPECIALIST

## 2018-04-09 NOTE — PROGRESS NOTES
Taco Torres : 1945 Age 74year old female patient is admitted to [facility] for OWEN        Chief complaint: Initial NP Assessment/Follow up post blood transfusion at [address] [place] [address], c.diff and c/o indurations in abdomen, DVT L femoral decision was made to admit pt for multiple unit blood transfusion and further work up. Pt received a total of 7 U PRBC between 4/3 and 4/5. Hgb was 8.2 on 4/6.   Pt was seen by GI (Dr. Suraj Garcia) while at [facility] and recs were made for Cscope, EGD and ca auscultation, on RA today     CARDIOVASCULAR: S1, S2, regular     ABDOMEN: Obese,normal active BS+, soft, non distended, nontender     : no vaginal bleeding     MUSCULOSKELETAL: L foot/ankle deformed- chronic.      EXTREMITIES/VASCULAR:+ trace edema BLE brachytherapy session d/c'd on 3/22     - f/u Dr. Kaylin Jay May 1st     -f/u with Edward Crews May 1st         3.  Diastolic congestive heart failure     - in-house cardiology following     - CPM lasix 40mg Daily,     - CHF clinic ff     - daily weights     - car

## 2018-04-10 ENCOUNTER — TELEPHONE (OUTPATIENT)
Dept: INTERNAL MEDICINE CLINIC | Facility: CLINIC | Age: 73
End: 2018-04-10

## 2018-04-11 PROCEDURE — 99308 SBSQ NF CARE LOW MDM 20: CPT | Performed by: INTERNAL MEDICINE

## 2018-04-13 ENCOUNTER — SNF/IP PROF CHARGE ONLY (OUTPATIENT)
Dept: INTERNAL MEDICINE CLINIC | Facility: CLINIC | Age: 73
End: 2018-04-13

## 2018-04-13 DIAGNOSIS — E66.01 MORBID OBESITY (HCC): ICD-10-CM

## 2018-04-13 DIAGNOSIS — I50.31 ACUTE DIASTOLIC CONGESTIVE HEART FAILURE (HCC): ICD-10-CM

## 2018-04-13 DIAGNOSIS — I10 ESSENTIAL HYPERTENSION: ICD-10-CM

## 2018-04-13 DIAGNOSIS — D64.9 ANEMIA, UNSPECIFIED TYPE: ICD-10-CM

## 2018-04-13 DIAGNOSIS — A04.72 C. DIFFICILE DIARRHEA: ICD-10-CM

## 2018-04-13 DIAGNOSIS — C54.1 ENDOMETRIAL CANCER (HCC): ICD-10-CM

## 2018-04-17 PROCEDURE — 99308 SBSQ NF CARE LOW MDM 20: CPT | Performed by: INTERNAL MEDICINE

## 2018-04-18 ENCOUNTER — SNF VISIT (OUTPATIENT)
Dept: INTERNAL MEDICINE CLINIC | Facility: SKILLED NURSING FACILITY | Age: 73
End: 2018-04-18

## 2018-04-18 DIAGNOSIS — Z09 FOLLOW UP: ICD-10-CM

## 2018-04-18 PROCEDURE — 99308 SBSQ NF CARE LOW MDM 20: CPT | Performed by: NURSE PRACTITIONER

## 2018-04-18 NOTE — PROGRESS NOTES
Amy Thakur : 1945 Age 74year old female patient is admitted to [facility] AND [facility] for OWEN        Chief complaint: Follow up post blood transfusion at [facility] ORTHO.        HPI:    Patient is a 69-year female with medical history of and further work up. Pt received a total of 7 U PRBC between 4/3 and 4/5. Hgb was 8.2 on 4/6. Pt was seen by GI (Dr. Rajat Clifford) while at [facility] and recs were made for Cscope, EGD and capsule study.   These tests were delayed due to re-occurrence of C. auscultation, on RA today     CARDIOVASCULAR: S1, S2, regular     ABDOMEN: Obese,normal active BS+, soft, non distended, nontender     : no vaginal bleeding     MUSCULOSKELETAL: L foot/ankle deformed- chronic.      EXTREMITIES/VASCULAR:+ trace edema BLE Pelvic MRI done @ [facility] & seen by Dr. Oly Leger 1/15.     -Unsuccessful tandem placement during adm on 3/12, device removed     -3/19 tandem and ovid placement successful with 6 brachytherapy session d/c'd on 3/22     - f/u Dr. Oly Leger May 1st     -f/u with / Darion

## 2018-04-19 ENCOUNTER — SNF/IP PROF CHARGE ONLY (OUTPATIENT)
Dept: INTERNAL MEDICINE CLINIC | Facility: CLINIC | Age: 73
End: 2018-04-19

## 2018-04-19 DIAGNOSIS — C54.1 ENDOMETRIAL CANCER (HCC): ICD-10-CM

## 2018-04-19 DIAGNOSIS — S92.902A FOOT FRACTURE, LEFT, CLOSED, INITIAL ENCOUNTER: ICD-10-CM

## 2018-04-19 DIAGNOSIS — I27.20 PULMONARY HTN (HCC): ICD-10-CM

## 2018-04-19 DIAGNOSIS — Z85.42 HISTORY OF ENDOMETRIAL CANCER: ICD-10-CM

## 2018-04-19 DIAGNOSIS — D64.9 ANEMIA, UNSPECIFIED TYPE: ICD-10-CM

## 2018-04-19 DIAGNOSIS — A04.72 C. DIFFICILE DIARRHEA: ICD-10-CM

## 2018-04-19 DIAGNOSIS — I10 ESSENTIAL HYPERTENSION: ICD-10-CM

## 2018-04-19 DIAGNOSIS — S92.345D CLOSED NONDISPLACED FRACTURE OF FOURTH METATARSAL BONE OF LEFT FOOT WITH ROUTINE HEALING: Chronic | ICD-10-CM

## 2018-04-19 DIAGNOSIS — E11.9 TYPE 2 DIABETES MELLITUS WITHOUT COMPLICATION, UNSPECIFIED WHETHER LONG TERM INSULIN USE (HCC): Chronic | ICD-10-CM

## 2018-04-19 DIAGNOSIS — E66.01 MORBID OBESITY (HCC): ICD-10-CM

## 2018-04-19 DIAGNOSIS — I27.20 PULMONARY HYPERTENSION, MODERATE TO SEVERE (HCC): ICD-10-CM

## 2018-04-19 DIAGNOSIS — D50.0 IRON DEFICIENCY ANEMIA DUE TO CHRONIC BLOOD LOSS: Chronic | ICD-10-CM

## 2018-04-19 PROCEDURE — 99308 SBSQ NF CARE LOW MDM 20: CPT | Performed by: INTERNAL MEDICINE

## 2018-04-23 ENCOUNTER — SNF VISIT (OUTPATIENT)
Dept: INTERNAL MEDICINE CLINIC | Facility: SKILLED NURSING FACILITY | Age: 73
End: 2018-04-23

## 2018-04-23 DIAGNOSIS — C54.1 ENDOMETRIAL CANCER (HCC): ICD-10-CM

## 2018-04-23 DIAGNOSIS — I82.412 ACUTE DEEP VEIN THROMBOSIS (DVT) OF FEMORAL VEIN OF LEFT LOWER EXTREMITY (HCC): ICD-10-CM

## 2018-04-23 DIAGNOSIS — D64.9 TRANSFUSION-DEPENDENT ANEMIA: ICD-10-CM

## 2018-04-23 DIAGNOSIS — A04.72 C. DIFFICILE COLITIS: ICD-10-CM

## 2018-04-23 DIAGNOSIS — Z09 FOLLOW UP: ICD-10-CM

## 2018-04-23 PROCEDURE — 99308 SBSQ NF CARE LOW MDM 20: CPT | Performed by: CLINICAL NURSE SPECIALIST

## 2018-04-23 RX ORDER — ONDANSETRON 4 MG/1
4 TABLET, ORALLY DISINTEGRATING ORAL EVERY 8 HOURS PRN
COMMUNITY
End: 2018-08-30

## 2018-04-23 NOTE — PROGRESS NOTES
Pedro Jordan : 1945 Age 74year old female patient is admitted to [facility] AND [facility] for OWEN         Chief complaint: Follow up transfusion dependent anemia, C-diff, DVT, endometrial cancer.          HPI:     Patient is a 69-year female blood transfusion and further work up. Pt received a total of 7 U PRBC between 4/3 and 4/5. Hgb was 8.2 on 4/6. Pt was seen by GI (Dr. Cuco Ugarte) while at [facility] and recs were made for Cscope, EGD and capsule study.  These tests were delayed due to re-occ regular     ABDOMEN: Obese,normal active BS+, soft, non distended, nontender     : no vaginal bleeding     MUSCULOSKELETAL: L foot/ankle deformed- chronic.      EXTREMITIES/VASCULAR:+ trace edema BLE     SKIN:LLQ induration from lovenox injections improvi -Unsuccessful tandem placement during adm on 3/12, device removed     -3/19 tandem and ovid placement successful with 6 brachytherapy session d/c'd on 3/22     - f/u Dr. Anshul Gavin May 1st     -f/u with Edward Crews May 1st         3.  Diastolic congestive hea

## 2018-05-01 ENCOUNTER — TELEPHONE (OUTPATIENT)
Dept: INTERNAL MEDICINE CLINIC | Facility: CLINIC | Age: 73
End: 2018-05-01

## 2018-05-01 NOTE — TELEPHONE ENCOUNTER
Travon Cordon from [facility] requesting to speak to Dr Adilson Warren regarding a issue with transportation

## 2018-05-03 PROCEDURE — 99308 SBSQ NF CARE LOW MDM 20: CPT | Performed by: INTERNAL MEDICINE

## 2018-05-08 PROCEDURE — 99308 SBSQ NF CARE LOW MDM 20: CPT | Performed by: INTERNAL MEDICINE

## 2018-05-10 PROCEDURE — 99308 SBSQ NF CARE LOW MDM 20: CPT | Performed by: INTERNAL MEDICINE

## 2018-05-11 ENCOUNTER — SNF VISIT (OUTPATIENT)
Dept: INTERNAL MEDICINE CLINIC | Facility: SKILLED NURSING FACILITY | Age: 73
End: 2018-05-11

## 2018-05-11 DIAGNOSIS — A04.72 C. DIFFICILE DIARRHEA: ICD-10-CM

## 2018-05-11 DIAGNOSIS — D64.9 ANEMIA, UNSPECIFIED TYPE: ICD-10-CM

## 2018-05-11 DIAGNOSIS — Z09 FOLLOW UP: ICD-10-CM

## 2018-05-11 PROCEDURE — 99309 SBSQ NF CARE MODERATE MDM 30: CPT | Performed by: NURSE PRACTITIONER

## 2018-05-11 NOTE — PROGRESS NOTES
Chin Huerta : 1945 Age 74year old female patient is admitted to [facility] for OWEN         Chief complaint: Follow up C-diff, & DVT        HPI:     Patient is a 69-year female with medical history of Diabetes, Hyperlipidemia, received a total of 7 U PRBC between 4/3 and 4/5. Hgb was 8.2 on 4/6. Pt was seen by GI (Dr. Bina De La Cruz) while at [facility] and recs were made for Cscope, EGD and capsule study. These tests were delayed due to re-occurrence of C.  Diff on 4/5 and pt was start or vomiting. + soft stools. PHYSICAL EXAM:     GENERAL HEALTH: Pleasant  female,morbidly obese & in no apparent distress.      HEENT: atraumatic/normocephalic, mucous membranes pink and moist, PERRLA, EOMI, sclera anicteric, conjunctiva nor by Dr. Elle Tate on 12/18 & 1/15.     - Pelvic MRI done @ [facility] & seen by Dr. Garber Butter 1/15.     -Unsuccessful tandem placement during adm on 3/12, device removed     -3/19 tandem and ovid placement successful with 6 brachytherapy session d/c'd on 3/22 CPAP/BiPAP in hospital, CPM     - in-house pulmonology following         12. UTI     -completed Bactrim 3/25         13. ? Asthma     -CPM Montlukast 10mg daily

## 2018-05-15 PROCEDURE — 99308 SBSQ NF CARE LOW MDM 20: CPT | Performed by: INTERNAL MEDICINE

## 2018-05-16 ENCOUNTER — TELEPHONE (OUTPATIENT)
Dept: INTERNAL MEDICINE CLINIC | Facility: CLINIC | Age: 73
End: 2018-05-16

## 2018-05-17 PROCEDURE — 99308 SBSQ NF CARE LOW MDM 20: CPT | Performed by: INTERNAL MEDICINE

## 2018-05-18 ENCOUNTER — SNF VISIT (OUTPATIENT)
Dept: INTERNAL MEDICINE CLINIC | Facility: SKILLED NURSING FACILITY | Age: 73
End: 2018-05-18

## 2018-05-18 DIAGNOSIS — Z09 FOLLOW UP: ICD-10-CM

## 2018-05-18 PROCEDURE — 99308 SBSQ NF CARE LOW MDM 20: CPT | Performed by: NURSE PRACTITIONER

## 2018-05-18 NOTE — PROGRESS NOTES
Claire Krueger : 1945 Age 74year old female patient is admitted to [facility] for OWEN         Chief complaint: Follow up        HPI:     Patient is a 69-year female with medical history of Diabetes, Hyperlipidemia, HTN, pulmonary U PRBC between 4/3 and 4/5. Hgb was 8.2 on 4/6. Pt was seen by GI (Dr. Cuco Ugarte) while at [place] and recs were made for Cscope, EGD and capsule study. These tests were delayed due to re-occurrence of C.  Diff on 4/5 and pt was started on oral vancomycin pink and moist, PERRLA, EOMI, sclera anicteric, conjunctiva normal.    RESPIRATORY:clear to auscultation, on RA today     CARDIOVASCULAR: S1, S2, regular     ABDOMEN: Obese,normal active BS+, soft, non distended, nontender     : no vaginal bleeding     M cancer     - s/p D&C on 11/28     - Uterine biopsy suggestive of Grade 1 endometrial cancer     -Seen by Dr. Coty Dean and thought she wasn't a surgical candidate so referred her ot Radiation/Oncology     - Seen by Dr. Jaymie Oliva on 12/18 & 1/15.     - Pel 11.Hypercapnic respiratory failure     - likely due to ASHLEY per pulmonology     - seen by Dr. Ada Duncan inpatient w/ recs for outpatient polysomnography     - continue supplemental Oxygen as needed     - was on CPAP/BiPAP in hospital, CPM     - in-house pulmo

## 2018-05-21 ENCOUNTER — SNF VISIT (OUTPATIENT)
Dept: INTERNAL MEDICINE CLINIC | Facility: SKILLED NURSING FACILITY | Age: 73
End: 2018-05-21

## 2018-05-21 DIAGNOSIS — D64.9 ANEMIA, UNSPECIFIED TYPE: ICD-10-CM

## 2018-05-21 DIAGNOSIS — Z09 FOLLOW UP: ICD-10-CM

## 2018-05-21 DIAGNOSIS — A04.72 C. DIFFICILE DIARRHEA: ICD-10-CM

## 2018-05-21 PROCEDURE — 99308 SBSQ NF CARE LOW MDM 20: CPT | Performed by: CLINICAL NURSE SPECIALIST

## 2018-05-22 NOTE — PROGRESS NOTES
Matthew Pachecorico : 1945 Age 74year old female patient is admitted to [facility] for OWEN   Chief complaint: anemia, Follow up c. diff   HPI:   Patient is a 69-year female with medical history of Diabetes, Hyperlipidemia, HTN, pulmo PRBC between 4/3 and 4/5. Hgb was 8.2 on 4/6. Pt was seen by GI (Dr. Sabina Marin) while at [facility] and recs were made for Cscope, EGD and capsule study. These tests were delayed due to re-occurrence of C.  Diff on 4/5 and pt was started on oral vancomycin x1 Denies any constipation/melena. Denies abdominal pain, nausea or vomiting. Denies any diarrhea. PHYSICAL EXAM:   GENERAL HEALTH: Pleasant  female,morbidly obese & in no apparent distress.    HEENT: atraumatic/normocephalic, mucous membranes pink Radiation/Oncology   - Seen by Dr. Mark Muñoz on 12/18 & 1/15.   - Pelvic MRI done @ [facility] & seen by Dr. Ean Boss 1/15.   -Unsuccessful tandem placement during adm on 3/12, device removed   -3/19 tandem and ovid placement successful with 6 brachytherapy sess

## 2018-05-24 PROCEDURE — 99308 SBSQ NF CARE LOW MDM 20: CPT | Performed by: INTERNAL MEDICINE

## 2018-05-29 PROCEDURE — 99308 SBSQ NF CARE LOW MDM 20: CPT | Performed by: INTERNAL MEDICINE

## 2018-06-04 ENCOUNTER — SNF VISIT (OUTPATIENT)
Dept: INTERNAL MEDICINE CLINIC | Facility: SKILLED NURSING FACILITY | Age: 73
End: 2018-06-04

## 2018-06-04 DIAGNOSIS — A04.72 C. DIFFICILE DIARRHEA: ICD-10-CM

## 2018-06-04 DIAGNOSIS — Z09 FOLLOW UP: ICD-10-CM

## 2018-06-04 DIAGNOSIS — I82.412 ACUTE DEEP VEIN THROMBOSIS (DVT) OF FEMORAL VEIN OF LEFT LOWER EXTREMITY (HCC): ICD-10-CM

## 2018-06-04 DIAGNOSIS — D64.9 ANEMIA, UNSPECIFIED TYPE: ICD-10-CM

## 2018-06-04 PROCEDURE — 99308 SBSQ NF CARE LOW MDM 20: CPT | Performed by: CLINICAL NURSE SPECIALIST

## 2018-06-04 NOTE — PROGRESS NOTES
Matthew Plaza : 1945 Age 74year old female patient is admitted to [facility] for OWEN     Chief complaint:  Follow up anemia, c. diff, DVT     HPI:   Patient is a 69-year female with medical history of Diabetes, Hyperlipidemia, H total of 7 U PRBC between 4/3 and 4/5. Hgb was 8.2 on 4/6. Pt was seen by GI (Dr. Shantel Cooper) while at [facility] and recs were made for Cscope, EGD and capsule study. These tests were delayed due to re-occurrence of C.  Diff on 4/5 and pt was started on oral MUSCULOSKELETAL: L foot/ankle deformed- chronic. EXTREMITIES/VASCULAR:+ trace edema BLE   SKIN:LUQ induration from lovenox injections improving. + small size induration noted on BUE   NEUROLOGIC: follows commands, WC bound & AOX3. Unable to ambulate.    P in-house cardiology following   - CPM lasix 20mg Daily,   - CHF clinic ff   - daily weights: stable   - cardiac diet w/ no added salt     4. Left femoral DVT   -CPM Lovenox 130mg injection SC Q12   - repeat femoral Dopplet at [address] negative but study poorl

## 2018-06-05 PROCEDURE — 99308 SBSQ NF CARE LOW MDM 20: CPT | Performed by: INTERNAL MEDICINE

## 2018-06-11 ENCOUNTER — SNF VISIT (OUTPATIENT)
Dept: INTERNAL MEDICINE CLINIC | Facility: SKILLED NURSING FACILITY | Age: 73
End: 2018-06-11

## 2018-06-11 DIAGNOSIS — D64.9 ANEMIA, UNSPECIFIED TYPE: ICD-10-CM

## 2018-06-11 DIAGNOSIS — I82.412 ACUTE DEEP VEIN THROMBOSIS (DVT) OF FEMORAL VEIN OF LEFT LOWER EXTREMITY (HCC): ICD-10-CM

## 2018-06-11 DIAGNOSIS — M79.675 PAIN OF LEFT GREAT TOE: ICD-10-CM

## 2018-06-11 DIAGNOSIS — Z09 FOLLOW UP: ICD-10-CM

## 2018-06-11 PROCEDURE — 99308 SBSQ NF CARE LOW MDM 20: CPT | Performed by: CLINICAL NURSE SPECIALIST

## 2018-06-11 NOTE — PROGRESS NOTES
Zulma De La Rosa : 1945 Age 74year old female patient is admitted to [facility] for OWEN     Chief complaint: Left great toe pain, Follow up anemia, c. diff, DVT     HPI:   Patient is a 69-year female with medical history of Diabete Pt received a total of 7 U PRBC between 4/3 and 4/5. Hgb was 8.2 on 4/6. Pt was seen by GI (Dr. Shawn Mills [facility])while at [place] and recs were made for Cscope, EGD and capsule study. These tests were delayed due to re-occurrence of C.  Diff on 4/5 and pt was sta conjunctiva normal.   RESPIRATORY:clear to auscultation, on RA today   CARDIOVASCULAR: S1, S2, regular   ABDOMEN: Obese,normal active BS+, soft, non distended, nontender.  2 induration upper LQ from lovenox injections   : no vaginal bleeding   MUSCULOSKEL done @ [facility] & seen by Dr. Fadi Ibarra 1/15.   -Unsuccessful tandem placement during adm on 3/12, device removed   -3/19 tandem and ovid placement successful with 6 brachytherapy session d/c'd on 3/22   - f/u Dr. Fadi Ibarra May 1st   -f/u with Edward Crews May 1st     3. CPM   - in-house pulmonology following     13. UTI   -completed Bactrim 3/25     14. ? Asthma   -CPM Montlukast 10mg daily   -in house pulmonology following    15.  L great toe pain   - will monitor   - improving   - use ice packs as needed

## 2018-06-13 ENCOUNTER — SNF VISIT (OUTPATIENT)
Dept: INTERNAL MEDICINE CLINIC | Facility: SKILLED NURSING FACILITY | Age: 73
End: 2018-06-13

## 2018-06-13 DIAGNOSIS — Z09 FOLLOW UP: ICD-10-CM

## 2018-06-13 DIAGNOSIS — D64.9 ANEMIA, UNSPECIFIED TYPE: ICD-10-CM

## 2018-06-13 PROCEDURE — 99308 SBSQ NF CARE LOW MDM 20: CPT | Performed by: NURSE PRACTITIONER

## 2018-06-13 NOTE — PROGRESS NOTES
Sadia Erazo : 1945 Age 74year old female patient is admitted to [facility] for OWEN     Chief complaint: Follow up anemia, post transfusion     HPI:   Patient is a 69-year female with medical history of Diabetes, Hyperlipidemia total of 7 U PRBC between 4/3 and 4/5. Hgb was 8.2 on 4/6. Pt was seen by GI (Dr. Hilario [facility])while at [place] and recs were made for Cscope, EGD and capsule study. These tests were delayed due to re-occurrence of C.  Diff on 4/5 and pt was started on oral v injections   : no vaginal bleeding   MUSCULOSKELETAL: L foot/ankle deformed- chronic; + pain to L great toe, no swelling, redness or bruising noted, able to move toe w/o pain    EXTREMITIES/VASCULAR:+ trace edema BLE   SKIN:LUQ induration from lovenox in [place] May 1st     3. Diastolic congestive heart failure   - in-house cardiology following   - CPM lasix 20mg BID,   - CHF clinic ff   - daily weights: stable   - cardiac diet w/ no added salt     4. Left femoral DVT   -CPM Lovenox 120mg injection SC Q12 (d

## 2018-06-18 ENCOUNTER — SNF VISIT (OUTPATIENT)
Dept: INTERNAL MEDICINE CLINIC | Facility: SKILLED NURSING FACILITY | Age: 73
End: 2018-06-18

## 2018-06-18 DIAGNOSIS — Z71.89 COMPLEX CARE COORDINATION: ICD-10-CM

## 2018-06-18 DIAGNOSIS — D64.9 ANEMIA, UNSPECIFIED TYPE: ICD-10-CM

## 2018-06-18 DIAGNOSIS — I82.412 ACUTE DEEP VEIN THROMBOSIS (DVT) OF FEMORAL VEIN OF LEFT LOWER EXTREMITY (HCC): ICD-10-CM

## 2018-06-18 DIAGNOSIS — Z09 FOLLOW UP: ICD-10-CM

## 2018-06-18 PROCEDURE — 99308 SBSQ NF CARE LOW MDM 20: CPT | Performed by: CLINICAL NURSE SPECIALIST

## 2018-06-18 NOTE — PROGRESS NOTES
Yanira Christina : 1945 Age 74year old female [facility]     Chief complaint: R arm ecchymosis, Follow up anemia, colonoscopy cancelled, DVT      HPI:   Patient is a 69-year female with medical histor further work up. Pt received a total of 7 U PRBC between 4/3 and 4/5. Hgb was 8.2 on 4/6. Pt was seen by GI (Dr. Collette Footman, [facility])while at [facility] and recs were made for Cscope, EGD and capsule study. These tests were delayed due to re-occurrence of C.  Diff on 4 : no vaginal bleeding   MUSCULOSKELETAL: L foot/ankle deformed- chronic; + pain to L great toe, no swelling, redness or bruising noted, able to move toe w/o pain, improving   EXTREMITIES/VASCULAR:+ trace edema BLE   SKIN:LUQ induration from lovenox injec @[facility] & seen by Dr. Yossi Benjamin 1/15.   -Unsuccessful tandem placement during adm on 3/12, device removed   -3/19 tandem and ovid placement successful with 6 brachytherapy session d/c'd on 3/22   - f/u Dr. Yossi Benjamin May 1st   -f/u with Edward Crews May 1st   3.  Steffi Salmeron -CPMMontlukast 10mg daily   -in house pulmonology following   15.  L great toe pain   - will monitor   - improved

## 2018-06-21 PROCEDURE — 99308 SBSQ NF CARE LOW MDM 20: CPT | Performed by: INTERNAL MEDICINE

## 2018-06-23 ENCOUNTER — SNF VISIT (OUTPATIENT)
Dept: INTERNAL MEDICINE CLINIC | Facility: SKILLED NURSING FACILITY | Age: 73
End: 2018-06-23

## 2018-06-23 DIAGNOSIS — Z09 FOLLOW UP: ICD-10-CM

## 2018-06-23 PROCEDURE — 99308 SBSQ NF CARE LOW MDM 20: CPT | Performed by: NURSE PRACTITIONER

## 2018-06-23 NOTE — PROGRESS NOTES
Cyndi Artist : 1945 Age 74year old female patient is admitted to [facility] for OWEN     Chief complaint: Follow up R upper arm ecchymosis and scheduled colonoscopy     HPI:   Patient is a 69-year female with medical history of work up. Pt received a total of 7 U PRBC between 4/3 and 4/5. Hgb was 8.2 on 4/6. Pt was seen by GI (Dr. Durwin Collet, [facility])while at [facility] and recs were made for Cscope, EGD and capsule study. These tests were delayed due to re-occurrence of C.  Diff on 4/5 and p CARDIOVASCULAR: S1, S2, regular   ABDOMEN: Obese,normal active BS+, soft, non distended, nontender.  2 induration upper LQ from lovenox injections   : no vaginal bleeding   MUSCULOSKELETAL: L foot/ankle deformed- chronic; + pain to L great toe, no swellin 11/28   - Uterine biopsy suggestive of Grade 1 endometrial cancer   -Seen by Dr. Derrick Medrano and thought she wasn't a surgical candidate so referred her ot Radiation/Oncology   - Seen by Dr. Eber Diaz on 12/18 & 1/15.   - [facility] & seen by  Baylee inpatient w/ recs for outpatient polysomnography   - continue supplemental Oxygen as needed   - was on CPAP/BiPAP in hospital, CPM   - in-house pulmonology following   13. UTI   -completed Bactrim 3/25   14. ?  Asthma   -CPMMontlukast 10mg daily   -

## 2018-06-26 ENCOUNTER — SNF VISIT (OUTPATIENT)
Dept: INTERNAL MEDICINE CLINIC | Facility: SKILLED NURSING FACILITY | Age: 73
End: 2018-06-26

## 2018-06-26 DIAGNOSIS — B96.20 E-COLI UTI: ICD-10-CM

## 2018-06-26 DIAGNOSIS — N39.0 E-COLI UTI: ICD-10-CM

## 2018-06-26 PROCEDURE — 99307 SBSQ NF CARE SF MDM 10: CPT | Performed by: NURSE PRACTITIONER

## 2018-06-27 ENCOUNTER — SNF VISIT (OUTPATIENT)
Dept: INTERNAL MEDICINE CLINIC | Facility: SKILLED NURSING FACILITY | Age: 73
End: 2018-06-27

## 2018-06-27 DIAGNOSIS — B96.20 E-COLI UTI: ICD-10-CM

## 2018-06-27 DIAGNOSIS — N39.0 E-COLI UTI: ICD-10-CM

## 2018-06-27 DIAGNOSIS — D64.9 ANEMIA, UNSPECIFIED TYPE: ICD-10-CM

## 2018-06-27 DIAGNOSIS — E87.6 HYPOKALEMIA: ICD-10-CM

## 2018-06-27 DIAGNOSIS — R60.0 EDEMA OF LOWER EXTREMITY: ICD-10-CM

## 2018-06-27 PROCEDURE — 99309 SBSQ NF CARE MODERATE MDM 30: CPT | Performed by: NURSE PRACTITIONER

## 2018-06-27 NOTE — PROGRESS NOTES
Encounter for abnormal laboratory test results review  Urine culture +ECOLI in urine, sensitivity to macrobid, start macrobid 100mg BID X 7 days  Encourage patient to increase oral fluid take.

## 2018-06-28 NOTE — PROGRESS NOTES
Cyndi Artist : 1945 Age 74year old female patient is admitted to [facility] for OWEN     Chief complaint: Follow up post colonoscopy, EGD, Anemia, c/o worsening of glaucoma, hypokalemia,UTI + ECOLI and BLE edema  HPI:   Patient multiple unit blood transfusion and further work up. Pt received a total of 7 U PRBC between 4/3 and 4/5. Hgb was 8.2 on 4/6. Pt was seen by GI (Dr. Melinda Correa [facility])while at [facility] and recs were made for Cscope, EGD and capsule study.  These tests were delayed abdominal pain, nausea or vomiting. Denies any diarrhea. Pain in L toe improving. PHYSICAL EXAM:   GENERAL HEALTH: Pleasant  female,morbidly obese & in no apparent distress.    HEENT: atraumatic/normocephalic, mucous membranes pink and moist, PER hematology with Dr. Cheryl Ruiz today (6/4) pt will ask about lovenox therapy   - seen by GI (dr. Enmanuel Alejandro) at [facility], [address] [place] [address] for: cscope, EGD and capsule study deferred 2/2 c.diff. Scheduled for 6/18   - last Hgb 7 on 6/4, 6/11 7.4 received 1 Unit PRBCs, 7.2 on Daily   10. Morbid obesity   -problem with moving, and getting up   - continue PT/OT   11. C.  Diff   -completed vanco 3/27   -completed another round of Vanco 125mg q6hrs x   14 days (4/19)   -Discontinue contact isolation 5/11   -CPM vanco 250mg daily unti

## 2018-06-29 ENCOUNTER — SNF/IP PROF CHARGE ONLY (OUTPATIENT)
Dept: INTERNAL MEDICINE CLINIC | Facility: CLINIC | Age: 73
End: 2018-06-29

## 2018-06-29 DIAGNOSIS — E11.8 TYPE 2 DIABETES MELLITUS WITH COMPLICATION, WITHOUT LONG-TERM CURRENT USE OF INSULIN (HCC): ICD-10-CM

## 2018-06-29 DIAGNOSIS — E61.1 IRON DEFICIENCY: ICD-10-CM

## 2018-06-29 DIAGNOSIS — S92.902A FOOT FRACTURE, LEFT, CLOSED, INITIAL ENCOUNTER: ICD-10-CM

## 2018-06-29 DIAGNOSIS — I27.20 PULMONARY HYPERTENSION, MODERATE TO SEVERE (HCC): ICD-10-CM

## 2018-06-29 DIAGNOSIS — D53.9 MACROCYTIC ANEMIA: ICD-10-CM

## 2018-06-29 DIAGNOSIS — D50.0 IRON DEFICIENCY ANEMIA DUE TO CHRONIC BLOOD LOSS: Chronic | ICD-10-CM

## 2018-06-29 DIAGNOSIS — S92.345D CLOSED NONDISPLACED FRACTURE OF FOURTH METATARSAL BONE OF LEFT FOOT WITH ROUTINE HEALING: Chronic | ICD-10-CM

## 2018-06-29 DIAGNOSIS — I10 ESSENTIAL HYPERTENSION: ICD-10-CM

## 2018-06-29 DIAGNOSIS — C54.1 ENDOMETRIAL CANCER (HCC): ICD-10-CM

## 2018-06-29 DIAGNOSIS — I50.31 ACUTE DIASTOLIC CONGESTIVE HEART FAILURE (HCC): ICD-10-CM

## 2018-06-29 DIAGNOSIS — D64.9 ANEMIA, UNSPECIFIED TYPE: ICD-10-CM

## 2018-06-29 DIAGNOSIS — A04.72 C. DIFFICILE DIARRHEA: ICD-10-CM

## 2018-06-29 DIAGNOSIS — E66.01 MORBID OBESITY (HCC): ICD-10-CM

## 2018-06-29 DIAGNOSIS — Z85.42 HISTORY OF ENDOMETRIAL CANCER: ICD-10-CM

## 2018-06-29 DIAGNOSIS — R73.9 HYPERGLYCEMIA: ICD-10-CM

## 2018-06-29 DIAGNOSIS — R29.6 RECURRENT FALLS: ICD-10-CM

## 2018-06-29 DIAGNOSIS — N30.00 ACUTE CYSTITIS WITHOUT HEMATURIA: ICD-10-CM

## 2018-06-29 DIAGNOSIS — E87.2 METABOLIC ACIDOSIS: ICD-10-CM

## 2018-06-29 DIAGNOSIS — E87.5 HYPERKALEMIA: ICD-10-CM

## 2018-06-29 DIAGNOSIS — I27.20 PULMONARY HTN (HCC): ICD-10-CM

## 2018-06-29 DIAGNOSIS — E11.9 TYPE 2 DIABETES MELLITUS WITHOUT COMPLICATION, UNSPECIFIED WHETHER LONG TERM INSULIN USE (HCC): Chronic | ICD-10-CM

## 2018-06-29 DIAGNOSIS — N95.0 POSTMENOPAUSAL BLEEDING: ICD-10-CM

## 2018-07-02 ENCOUNTER — SNF VISIT (OUTPATIENT)
Dept: INTERNAL MEDICINE CLINIC | Facility: SKILLED NURSING FACILITY | Age: 73
End: 2018-07-02

## 2018-07-02 DIAGNOSIS — E87.6 HYPOKALEMIA: ICD-10-CM

## 2018-07-02 DIAGNOSIS — Z09 FOLLOW UP: ICD-10-CM

## 2018-07-02 DIAGNOSIS — N30.00 ACUTE CYSTITIS WITHOUT HEMATURIA: ICD-10-CM

## 2018-07-02 DIAGNOSIS — D64.9 ANEMIA, UNSPECIFIED TYPE: ICD-10-CM

## 2018-07-02 PROCEDURE — 99308 SBSQ NF CARE LOW MDM 20: CPT | Performed by: CLINICAL NURSE SPECIALIST

## 2018-07-02 RX ORDER — POTASSIUM CHLORIDE 1.5 G/1.77G
20 POWDER, FOR SOLUTION ORAL DAILY
COMMUNITY
End: 2018-09-13

## 2018-07-02 NOTE — PROGRESS NOTES
Jimmy An : 1945 Age 74year old female patient is admitted to [facility] for OWEN     Chief complaint: Follow up post colonoscopy, EGD, Anemia, c/o worsening of glaucoma, hypokalemia,UTI + ECOLI and BLE edema     HPI:   [place] multiple unit blood transfusion and further work up. Pt received a total of 7 U PRBC between 4/3 and 4/5. Hgb was 8.2 on 4/6. Pt was seen by GI (Dr. Rosa Maria Wu [facility])while at [facility] and recs were made for Cscope, EGD and capsule study.  These tests were delayed apparent distress.    HEENT: atraumatic/normocephalic, mucous membranes pink and moist, PERRLA, EOMI, sclera anicteric, conjunctiva normal.   RESPIRATORY:clear to auscultation, on RA today   CARDIOVASCULAR: S1, S2, regular   ABDOMEN: Obese,normal active BS+ deferred 2/2 c.diff at that time. Tests completed 6/25   - Will restart Iron as it was on hold for colonoscopy on 6/25     2. Endometrial cancer- s/p D&C on 11/28   - Uterine biopsy suggestive of Grade 1 endometrial cancer   -Seen by Dr. Melissa Rock and thought colonoscopy for further taper recs. Currently on BID doseing 2/2 recent abx use     12. Hypercapnic respiratory failure   - likely due to ASHLEY per pulmonology   - seen by Dr. Clemencia Park inpatient w/ recs for outpatient polysomnography   - continue supplemental

## 2018-07-03 ENCOUNTER — SNF/IP PROF CHARGE ONLY (OUTPATIENT)
Dept: INTERNAL MEDICINE CLINIC | Facility: CLINIC | Age: 73
End: 2018-07-03

## 2018-07-03 DIAGNOSIS — N30.00 ACUTE CYSTITIS WITHOUT HEMATURIA: ICD-10-CM

## 2018-07-03 DIAGNOSIS — I27.20 PULMONARY HYPERTENSION, MODERATE TO SEVERE (HCC): ICD-10-CM

## 2018-07-03 DIAGNOSIS — Z85.42 HISTORY OF ENDOMETRIAL CANCER: ICD-10-CM

## 2018-07-03 DIAGNOSIS — D64.9 ANEMIA, UNSPECIFIED TYPE: ICD-10-CM

## 2018-07-03 DIAGNOSIS — I27.20 PULMONARY HTN (HCC): ICD-10-CM

## 2018-07-03 DIAGNOSIS — C54.1 ENDOMETRIAL CANCER (HCC): ICD-10-CM

## 2018-07-03 DIAGNOSIS — E11.9 TYPE 2 DIABETES MELLITUS WITHOUT COMPLICATION, UNSPECIFIED WHETHER LONG TERM INSULIN USE (HCC): Chronic | ICD-10-CM

## 2018-07-03 DIAGNOSIS — S92.345D CLOSED NONDISPLACED FRACTURE OF FOURTH METATARSAL BONE OF LEFT FOOT WITH ROUTINE HEALING: Chronic | ICD-10-CM

## 2018-07-03 DIAGNOSIS — E66.01 MORBID OBESITY (HCC): ICD-10-CM

## 2018-07-03 DIAGNOSIS — I10 ESSENTIAL HYPERTENSION: ICD-10-CM

## 2018-07-03 PROCEDURE — 99308 SBSQ NF CARE LOW MDM 20: CPT | Performed by: INTERNAL MEDICINE

## 2018-07-05 ENCOUNTER — TELEPHONE (OUTPATIENT)
Dept: INTERNAL MEDICINE CLINIC | Facility: CLINIC | Age: 73
End: 2018-07-05

## 2018-07-11 ENCOUNTER — SNF VISIT (OUTPATIENT)
Dept: INTERNAL MEDICINE CLINIC | Facility: SKILLED NURSING FACILITY | Age: 73
End: 2018-07-11

## 2018-07-11 DIAGNOSIS — D64.9 ANEMIA, UNSPECIFIED TYPE: ICD-10-CM

## 2018-07-11 DIAGNOSIS — R60.0 BILATERAL LOWER EXTREMITY EDEMA: ICD-10-CM

## 2018-07-11 PROCEDURE — 99309 SBSQ NF CARE MODERATE MDM 30: CPT | Performed by: NURSE PRACTITIONER

## 2018-07-11 NOTE — PROGRESS NOTES
Jimmy An : 1945 Age 74year old female patient is admitted to [facility] for OWEN     Chief complaint: Follow Anemia and CHF     HPI:   Patient is a 69-year female with medical history of Diabetes, Hyperlipidemia,HTN, pulmona between 4/3 and 4/5. Hgb was 8.2 on 4/6. Pt was seen by GI (Lc Simonm)while at [facility] and recs were made for Cscope, EGD and capsule study. These tests were delayed due to re-occurrence of C.  Diff on 4/5 and pt was started on oral vancomycin x14 days HEALTH: Pleasant  female,morbidly obese & in no apparent distress.    HEENT: atraumatic/normocephalic, mucous membranes pink and moist, PERRLA, EOMI, sclera anicteric, conjunctiva normal.   RESPIRATORY:clear to auscultation, on RA today   CARDIOVAS -Seen by Dr. Hsieh Case and thought she wasn't a surgical candidate so referred her ot Radiation/Oncology   - Seen by Dr. Anusha Schilling on 12/18 & 1/15.   - Pelvic MRI done @[facility] & seen by Dr. Snyder Conley 1/15.   -Unsuccessful tandem placement during adm on 3/12, dev polysomnography   - continue supplemental Oxygen as needed   - was on CPAP/BiPAP in hospital, CPM   - in-house pulmonology following     13.  UTI   -completed Bactrim 3/25   - +ESBL completed macrobid x 7 days on 7/1   - vanco 250mg BID PO x 7 more days and

## 2018-07-13 PROCEDURE — 99308 SBSQ NF CARE LOW MDM 20: CPT | Performed by: INTERNAL MEDICINE

## 2018-07-16 ENCOUNTER — SNF VISIT (OUTPATIENT)
Dept: INTERNAL MEDICINE CLINIC | Facility: SKILLED NURSING FACILITY | Age: 73
End: 2018-07-16

## 2018-07-16 DIAGNOSIS — D64.9 ANEMIA, UNSPECIFIED TYPE: ICD-10-CM

## 2018-07-16 DIAGNOSIS — Z09 FOLLOW UP: ICD-10-CM

## 2018-07-16 DIAGNOSIS — N30.00 ACUTE CYSTITIS WITHOUT HEMATURIA: ICD-10-CM

## 2018-07-16 DIAGNOSIS — I50.9 CONGESTIVE HEART FAILURE, UNSPECIFIED HF CHRONICITY, UNSPECIFIED HEART FAILURE TYPE (HCC): ICD-10-CM

## 2018-07-16 PROCEDURE — 99308 SBSQ NF CARE LOW MDM 20: CPT | Performed by: CLINICAL NURSE SPECIALIST

## 2018-07-16 RX ORDER — CIPROFLOXACIN 250 MG/1
250 TABLET, FILM COATED ORAL 2 TIMES DAILY
COMMUNITY
Start: 2018-07-14 | End: 2018-07-19

## 2018-07-16 NOTE — PROGRESS NOTES
Castrohiro Melissa : 1945 Age 74year old female patient is admitted to [facility] for OWEN     Chief complaint: UTI, Follow Anemia and CHF     HPI:   Patient is a 69-year female with medical history of Diabetes, Hyperlipidemia,HTN, pu PRBC between 4/3 and 4/5. Hgb was 8.2 on 4/6. [facility] at [address] [place] [address] and recs were made for Cscope, EGD and capsule study. These tests were delayed due to re-occurrence of C.  Diff on 4/5 and pt was started on oral vancomycin x14 from lovenox injections   : no vaginal bleeding   MUSCULOSKELETAL: L foot/ankle deformed- chronic;   EXTREMITIES/VASCULAR:+1 edema BLE R>L mainly at feet   SKIN:LUQ induration from lovenox injections improving.    NEUROLOGIC: follows commands and no focal congestive heart failure   - in-house cardiology following   - CPM lasix 20mg BID,   - CHF clinic ff   - daily weights: stable   - cardiac diet w/ no added salt     4. Left femoral DVT   -CPM Lovenox 120mg injection SC Q12 (decreased from 130 on 6/4 by  pulmonology following     15. L great toe pain   - resolved     16. Hypokalemia-resolved   - 3.4 on 6/27   - CPM KCL 20mEq QD   - K level 3.9 7/9.   -BMP 7/16   -Will monitor.

## 2018-07-18 ENCOUNTER — SNF/IP PROF CHARGE ONLY (OUTPATIENT)
Dept: INTERNAL MEDICINE CLINIC | Facility: CLINIC | Age: 73
End: 2018-07-18

## 2018-07-18 ENCOUNTER — SNF VISIT (OUTPATIENT)
Dept: INTERNAL MEDICINE CLINIC | Facility: SKILLED NURSING FACILITY | Age: 73
End: 2018-07-18

## 2018-07-18 DIAGNOSIS — S92.902A FOOT FRACTURE, LEFT, CLOSED, INITIAL ENCOUNTER: ICD-10-CM

## 2018-07-18 DIAGNOSIS — D64.9 ANEMIA, UNSPECIFIED TYPE: ICD-10-CM

## 2018-07-18 DIAGNOSIS — S92.345D CLOSED NONDISPLACED FRACTURE OF FOURTH METATARSAL BONE OF LEFT FOOT WITH ROUTINE HEALING: Chronic | ICD-10-CM

## 2018-07-18 DIAGNOSIS — I10 ESSENTIAL HYPERTENSION: ICD-10-CM

## 2018-07-18 DIAGNOSIS — Z85.42 HISTORY OF ENDOMETRIAL CANCER: ICD-10-CM

## 2018-07-18 DIAGNOSIS — E11.9 TYPE 2 DIABETES MELLITUS WITHOUT COMPLICATION, UNSPECIFIED WHETHER LONG TERM INSULIN USE (HCC): Chronic | ICD-10-CM

## 2018-07-18 DIAGNOSIS — I50.33 ACUTE ON CHRONIC DIASTOLIC CONGESTIVE HEART FAILURE (HCC): ICD-10-CM

## 2018-07-18 DIAGNOSIS — I50.31 ACUTE DIASTOLIC CONGESTIVE HEART FAILURE (HCC): ICD-10-CM

## 2018-07-18 PROCEDURE — 99308 SBSQ NF CARE LOW MDM 20: CPT | Performed by: NURSE PRACTITIONER

## 2018-07-18 NOTE — PROGRESS NOTES
Bon Davis : 1945 Age 74year old female patient is admitted to [facility] for OWEN     Chief complaint: Follow  CHF     HPI:   Patient is a 69-year female with medical history of Diabetes, Hyperlipidemia,HTN, pulmonary edema, 4/3 and 4/5. Hgb was 8.2 on 4/6. Pt was seen by GI (Dr. Rosa Maria Wu [facility])while at [place] and recs were made for Cscope, EGD and capsule study. These tests were delayed due to re-occurrence of C. Diff on 4/5 and pt was started on oral vancomycin x14 days.  Per GI mainly at feet, slighlty improved. SKIN:LUQ induration from lovenox injections improving. NEUROLOGIC: follows commands and no focal deficits, WC bound & AOX3. PSYCHIATRIC: alert and oriented x 3; affect appropriate and follows commands.      ASSESSMEN stable   - cardiac diet w/ no added salt     4. Left femoral DVT   -CPM Lovenox 120mg injection SC Q12 (decreased from 130 on 6/4 by Dr. Suri Posey)   - repeat femoral Doppler at [facility] negative but study poorly visualized so decision was made to continue loven 20mEq QD   - K level 3.9 7/9.   -BMP 7/16   -Will monitor.

## 2018-07-26 ENCOUNTER — SNF VISIT (OUTPATIENT)
Dept: INTERNAL MEDICINE CLINIC | Facility: SKILLED NURSING FACILITY | Age: 73
End: 2018-07-26

## 2018-07-26 DIAGNOSIS — C54.1 ENDOMETRIAL CANCER (HCC): ICD-10-CM

## 2018-07-26 DIAGNOSIS — I50.9 CONGESTIVE HEART FAILURE, UNSPECIFIED HF CHRONICITY, UNSPECIFIED HEART FAILURE TYPE (HCC): ICD-10-CM

## 2018-07-26 DIAGNOSIS — D64.9 ANEMIA, UNSPECIFIED TYPE: ICD-10-CM

## 2018-07-26 DIAGNOSIS — Z09 FOLLOW UP: ICD-10-CM

## 2018-07-26 PROCEDURE — 99308 SBSQ NF CARE LOW MDM 20: CPT | Performed by: CLINICAL NURSE SPECIALIST

## 2018-07-26 RX ORDER — ISOSORBIDE MONONITRATE 30 MG/1
30 TABLET, EXTENDED RELEASE ORAL DAILY
COMMUNITY
End: 2018-09-13

## 2018-07-26 NOTE — PROGRESS NOTES
Amy Thakur : 1945 Age 74year old female patient is admitted to [facility] for OWEN     Chief complaint: Follow anemia, endometrial cancer, CHF     HPI:   Patient is a 69-year female with medical history of Diabetes, Hyperlipid total of 7 U PRBC between 4/3 and 4/5. Hgb was 8.2 on 4/6. Pt was seen by GI (Dr. Jose Guadalupe Garcia [facility])while at [facility] and recswere made for Cscope, EGD and capsule study. These tests were delayed due to re-occurrence of C.  Diff on 4/5 and pt was started on oral va EXTREMITIES/VASCULAR:+1 edema BLE R>L mainly at feet, slighlty improved. SKIN:LUQ induration from lovenox injections improving. NEUROLOGIC: follows commands and no focal deficits, WC bound & AOX3.    PSYCHIATRIC: alert and oriented x 3; affect appropria femoral DVT   -CPM Lovenox 120mg injection SC Q12 (decreased from 130 on 6/4 by Dr. Sailaja Yoo)   - repeat femoral Doppler at [facility] negative but study poorly visualized so decision was made to continue lovenox   - f/u w/  7/30/18     5.  Pulmonary hyp 20mEq QD   - K level 3.9 7/9.   -Will monitor.

## 2018-07-31 ENCOUNTER — SNF VISIT (OUTPATIENT)
Dept: INTERNAL MEDICINE CLINIC | Facility: SKILLED NURSING FACILITY | Age: 73
End: 2018-07-31

## 2018-07-31 DIAGNOSIS — C54.1 ENDOMETRIAL CANCER (HCC): ICD-10-CM

## 2018-07-31 DIAGNOSIS — Z09 FOLLOW UP: ICD-10-CM

## 2018-07-31 DIAGNOSIS — D64.9 ANEMIA, UNSPECIFIED TYPE: ICD-10-CM

## 2018-07-31 DIAGNOSIS — Z02.9 DISCHARGE PLANNING ISSUES: ICD-10-CM

## 2018-07-31 PROCEDURE — 99308 SBSQ NF CARE LOW MDM 20: CPT | Performed by: CLINICAL NURSE SPECIALIST

## 2018-07-31 NOTE — PROGRESS NOTES
Cari Valadez : 1945 Age 74year old female patient is admitted to [facility] for OWEN     Chief complaint: Discharge planning, Follow anemia, endometrial cancer, CHF     HPI:   Patient is a 71-year female with medical history of up. Pt received a total of 7 U PRBC between 4/3 and 4/5. Hgb was 8.2 on 4/6. Pt was seen by GI (Dr. Rick Manzo [facility])while at [facility] and recswere made for Cscope, EGD and capsule study. These tests were delayed due to re-occurrence of C.  Diff on 4/5 and pt was hx of Anemia. Denies any constipation/melena. Denies abdominal pain, nausea or vomiting. Denies any diarrhea. PHYSICAL EXAM:   GENERAL HEALTH: Pleasant  female,morbidly obese & in no apparent distress.    HEENT: atraumatic/normocephalic, mucous suggestive of Grade 1 endometrial cancer   -Seen by Dr. Jenna Hightower and thought she wasn't a surgical candidate so referred her ot Radiation/Oncology   - Seen by Dr. Jenna Hull on 12/18 & 1/15.   - Pelvic MRI done @[facility] & seen by Dr. Valerie Ireland 1/15.   Laddonia Henok 6/25, will see ID specialist after colonoscopy for further taper recs.  Discussed w/ pt to scheduled f/u appt w/ ID.   12.Hypercapnic respiratory failure   - likely due to ASHLEY per pulmonology   - seen by Dr. Millard Height inpatient w/ recs for outpatient polysomn

## 2018-08-06 ENCOUNTER — SNF VISIT (OUTPATIENT)
Dept: INTERNAL MEDICINE CLINIC | Facility: SKILLED NURSING FACILITY | Age: 73
End: 2018-08-06

## 2018-08-06 DIAGNOSIS — C54.1 ENDOMETRIAL CANCER (HCC): ICD-10-CM

## 2018-08-06 DIAGNOSIS — D64.9 ANEMIA, UNSPECIFIED TYPE: ICD-10-CM

## 2018-08-06 DIAGNOSIS — Z09 FOLLOW UP: ICD-10-CM

## 2018-08-06 DIAGNOSIS — S80.11XA LEG HEMATOMA, RIGHT, INITIAL ENCOUNTER: ICD-10-CM

## 2018-08-06 PROCEDURE — 99308 SBSQ NF CARE LOW MDM 20: CPT | Performed by: CLINICAL NURSE SPECIALIST

## 2018-08-06 NOTE — PROGRESS NOTES
[facility] : 1945 Age 74year old female patient is admitted to [facility] for OWEN   Chief complaint: R inner thigh small hematoma vs ? abscess, Follow/up anemia, endometrial cancer, CHF     HPI:   Patient is a 69-year female transfusion and further work up. Pt received a total of 7 U PRBC between 4/3 and 4/5. Hgb was 8.2 on 4/6. Pt was seen by GI (Dr. Dhruv Richards [facility])while at [facility] and recswere made for Cscope, EGD and capsule study.  These tests were delayed due to re-occurrence membranes pink and moist, PERRLA, EOMI, sclera anicteric, conjunctiva normal.   RESPIRATORY:clear to auscultation, on RA today   CARDIOVASCULAR: S1, S2, regular   ABDOMEN: Obese,normal active BS+, soft, non distended, nontender.  2 induration upper LQ from suggestive of Grade 1 endometrial cancer   -Seen by Dr. Apple Cornelius and thought she wasn't a surgical candidate so referred her ot Radiation/Oncology   - Seen by Dr. Arnulfo Harvey on 12/18 & 1/15.   - Pelvic MRI done @[facility] & seen by Dr. Dat Conley 1/15.   Johny Haines 6/25, will see ID specialist after colonoscopy for further taper recs.  Discussed w/ pt to scheduled f/u appt w/ ID.   12.Hypercapnic respiratory failure   - likely due to ASHLEY per pulmonology   - seen by Dr. Dino Rudd inpatient w/ recs for outpatient polysomn

## 2018-08-08 ENCOUNTER — SNF VISIT (OUTPATIENT)
Dept: INTERNAL MEDICINE CLINIC | Facility: SKILLED NURSING FACILITY | Age: 73
End: 2018-08-08

## 2018-08-08 DIAGNOSIS — L03.115 CELLULITIS OF RIGHT THIGH: ICD-10-CM

## 2018-08-08 PROCEDURE — 99309 SBSQ NF CARE MODERATE MDM 30: CPT | Performed by: NURSE PRACTITIONER

## 2018-08-09 ENCOUNTER — SNF VISIT (OUTPATIENT)
Dept: INTERNAL MEDICINE CLINIC | Facility: SKILLED NURSING FACILITY | Age: 73
End: 2018-08-09

## 2018-08-09 DIAGNOSIS — L03.818 CELLULITIS OF OTHER SPECIFIED SITE: ICD-10-CM

## 2018-08-09 DIAGNOSIS — D64.9 ANEMIA, UNSPECIFIED TYPE: ICD-10-CM

## 2018-08-09 DIAGNOSIS — Z09 FOLLOW UP: ICD-10-CM

## 2018-08-09 DIAGNOSIS — S80.11XS HEMATOMA OF LEG, RIGHT, SEQUELA: ICD-10-CM

## 2018-08-09 PROCEDURE — 99308 SBSQ NF CARE LOW MDM 20: CPT | Performed by: CLINICAL NURSE SPECIALIST

## 2018-08-09 RX ORDER — SULFAMETHOXAZOLE AND TRIMETHOPRIM 800; 160 MG/1; MG/1
1 TABLET ORAL 2 TIMES DAILY
COMMUNITY
Start: 2018-08-09 | End: 2018-08-18

## 2018-08-09 NOTE — PROGRESS NOTES
Umberto Mendez : 1945 Age 74year old female patient is admitted to [facility] for OWEN     Chief complaint: F/u R inner thigh small hematoma vs ? abscess, anemia, endometrial cancer, CHF     HPI:   Patient is a 69-year female with transfusion and further work up. Pt received a total of 7 U PRBC between 4/3 and 4/5. Hgb was 8.2 on 4/6. Pt was seen by GI (Dr. Reinaldo Manzano [facility])while at [facility] made for Cscope, EGD and capsule study.  These tests were delayed due to re-occurrence female,morbidly obese & in no apparent distress.    HEENT: atraumatic/normocephalic, mucous membranes pink and moist, PERRLA, EOMI, sclera anicteric, conjunctiva normal.   RESPIRATORY:clear to auscultation, on RA today   CARDIOVASCULAR: S1, S2, regular   AB Colonoscopy and EGD done 6/25-results unknown,according to patient 4 polyps removed and biopsy done w/o any significant findings   2. Endometrial cancer   - s/p D&C on 11/28   - Uterine biopsy suggestive of Grade 1 endometrial cancer   -Seen by Dr. Hsieh Case a PT/OT   11. C.  Diff   -completed vanco 3/27   -completed another round of Vanco 125mg q6hrs x   14 days (4/19)   -Discontinue contact isolation 5/11   -CPM vanco 250mg daily until colonoscopy 6/25, will see ID specialist after colonoscopy for further taper

## 2018-08-10 NOTE — PROGRESS NOTES
Umberto Mendez : 1945 Age 74year old female patient is admitted to [facility] for [facility]EN   Chief complaint: R inner thigh cellulitis and Follow/up anemia     HPI:   Patient is a 69-year female with medical history of Diabetes, Hyper a total of 7 U PRBC between 4/3 and 4/5. Hgb was 8.2 on 4/6. [facility] at [address] [place] [address] and recswere made for Cscope, EGD and capsule study. These tests were delayed due to re-occurrence of C.  Diff on 4/5 and pt was started on oral Denies abdominal pain, nausea or vomiting. Denies any diarrhea. PHYSICAL EXAM:   GENERAL HEALTH: Pleasant  female,morbidly obese & in no apparent distress.    HEENT: atraumatic/normocephalic, mucous membranes pink and moist, PERRLA, EOMI, sclera (dr. Sherine Flores) at [address] for: cscope, EGD and capsule study deferred 2/2 c.diff at that time.  Tests completed 6/25   - CPM iron daily   - Colonoscopy and EGD done 6/25-results unknown,according to patient 4 polyps removed and biopsy done w/o any si AC TID   - Hgb A1c 5.1 12/1   9. Hyperlipidemia   -CPM Atorvastatin Daily   10. Morbid obesity   -problem with moving, and getting up   - continue PT/OT   11. C.  Diff   -completed vanco 3/27   -completed another round of Vanco 125mg q6hrs x   14 days (4/19)

## 2018-08-13 ENCOUNTER — SNF VISIT (OUTPATIENT)
Dept: INTERNAL MEDICINE CLINIC | Facility: SKILLED NURSING FACILITY | Age: 73
End: 2018-08-13

## 2018-08-13 DIAGNOSIS — L02.415 ABSCESS OF RIGHT THIGH: ICD-10-CM

## 2018-08-13 PROCEDURE — 99308 SBSQ NF CARE LOW MDM 20: CPT | Performed by: NURSE PRACTITIONER

## 2018-08-13 NOTE — PROGRESS NOTES
Radha Marcano : 1945 Age 74year old female patient is admitted to [facility] for OWEN     Chief complaint: F/u R inner thigh abscess and  anemia     HPI:   Patient is a 69-year female with medical history of Diabetes, Hyperlipide total of 7 U PRBC between 4/3 and 4/5. Hgb was 8.2 on 4/6. Pt was seen by GI (Dr. David Samson [facility])while at [place] [facility] made for Cscope, EGD and capsule study. These tests were delayed due to re-occurrence of C.  Diff on 4/5 and pt was started on oral va EOMI, sclera anicteric, conjunctiva normal.   RESPIRATORY:clear to auscultation, on RA today   CARDIOVASCULAR: S1, S2, regular   ABDOMEN: Obese,normal active BS+, soft, non distended, nontender.  2 induration upper LQ from lovenox injections   : no vagina - Seen by Dr. Orvan Galeazzi on 12/18 & 1/15.   - Pelvic MRI done @[facility] & seen by Dr. Mccoy List 1/15.   -Unsuccessful tandem placement during adm on 3/12, device removed   -3/19 tandem and ovid placement successful with 6 brachytherapy session d/c'd on 3/22   - QID dosing as pt started on Bactrim    12. Hypercapnic respiratory failure   - likely due to ASHLEY per pulmonology   - seen by Dr. David Treviño inpatient w/ recs for outpatient polysomnography   - continue supplemental Oxygen as needed   - was on CPAP/BiPAP in hos

## 2018-08-15 ENCOUNTER — SNF VISIT (OUTPATIENT)
Dept: INTERNAL MEDICINE CLINIC | Facility: SKILLED NURSING FACILITY | Age: 73
End: 2018-08-15

## 2018-08-15 DIAGNOSIS — L02.415 ABSCESS OF RIGHT THIGH: ICD-10-CM

## 2018-08-15 DIAGNOSIS — D64.9 ANEMIA, UNSPECIFIED TYPE: ICD-10-CM

## 2018-08-15 DIAGNOSIS — Z09 FOLLOW UP: ICD-10-CM

## 2018-08-15 PROCEDURE — 99308 SBSQ NF CARE LOW MDM 20: CPT | Performed by: CLINICAL NURSE SPECIALIST

## 2018-08-15 NOTE — PROGRESS NOTES
Pedro Jordan : 1945 Age 74year old female patient is admitted to [facility] for OWEN     Chief complaint: F/u R inner thigh abscess and anemia     HPI:   Patient is a 69-year female with medical history of Diabetes, Hyperlipidem total of 7 U PRBC between 4/3 and 4/5. Hgb was 8.2 on 4/6. Pt was seen by GI (Dr. Hilario Chandler [facility])while at [facility] and recswere made for Cscope, EGD and capsule study. These tests were delayed due to re-occurrence of C.  Diff on 4/5 and pt was started on oral va Obese,normal active BS+, soft, non distended, nontender. 2 induration upper LQ from lovenox injections   : no vaginal bleeding   MUSCULOSKELETAL:L foot/ankle deformed- chronic;   EXTREMITIES/VASCULAR:+1 edema BLE R>L mainly at feet, slighlty improved. on 3/12, device removed   -3/19 tandem and ovid placement successful with 6 brachytherapy session d/c'd on 3/22   - f/u Dr. Godwin Carmichael 07/30/18, next f/u 4 weeks.  Pt will see Dr. Godwin Carmichael and Melissa Rock at the same time to discuss future plan of care   - f/u / [facility] failure   - likely due to ASHLEY per pulmonology   - seen by Dr. Helga Santa inpatient w/ recs for outpatient polysomnography   - continue supplemental Oxygen as needed   - was on CPAP/BiPAP in hospital, CPM   - in-house pulmonology [id number].  UTI   -completed

## 2018-08-17 ENCOUNTER — TELEPHONE (OUTPATIENT)
Dept: INTERNAL MEDICINE CLINIC | Facility: CLINIC | Age: 73
End: 2018-08-17

## 2018-08-17 NOTE — TELEPHONE ENCOUNTER
Out on pass for Sunday. Alert. Not ambulatory. Uses Wheelchair. Dine with Family. Coming back to rehab. Out for 2 hrs. ONLY. Message to PCP.

## 2018-08-20 ENCOUNTER — SNF VISIT (OUTPATIENT)
Dept: INTERNAL MEDICINE CLINIC | Facility: SKILLED NURSING FACILITY | Age: 73
End: 2018-08-20

## 2018-08-20 DIAGNOSIS — D64.9 ANEMIA, UNSPECIFIED TYPE: ICD-10-CM

## 2018-08-20 DIAGNOSIS — L02.415 ABSCESS OF RIGHT THIGH: ICD-10-CM

## 2018-08-20 DIAGNOSIS — Z09 FOLLOW UP: ICD-10-CM

## 2018-08-20 PROCEDURE — 99308 SBSQ NF CARE LOW MDM 20: CPT | Performed by: CLINICAL NURSE SPECIALIST

## 2018-08-20 NOTE — PROGRESS NOTES
Jose Luis Esquivel : 1945 Age 74year old female patient is admitted to [facility] for OWEN     Chief complaint: F/u R inner thigh abscess and anemia     HPI:   Patient is a 69-year female with medical history of Diabetes, Hyperlipidem total of 7 U PRBC between 4/3 and 4/5. Hgb was 8.2 on 4/6. Pt was seen by GI (Dr. Alexa Riggs [facility])while at [facility] and recswere made for Cscope, EGD and capsule study. These tests were delayed due to re-occurrence of C.  Diff on 4/5 and pt was started on oral va MUSCULOSKELETAL:L foot/ankle deformed- chronic;   EXTREMITIES/VASCULAR:+1 edema BLE R>L mainly at feet, slighlty improved. SKIN:LUQ induration from lovenox injections improving. + R inner thigh abscess w/ surrounding induration improved significantly.  L f/u Dr. Lilia Lua 07/30/18, next f/u 4 weeks. Pt will see Dr. Lilia Lua and Dimitris Castro at the same time to discuss future plan of care   - f/u Edward Crews in 4 weeks   3.  Diastolic congestive heart failure   - in-house cardiology following   - CPM lasix 20mg BID,   - C - continue supplemental Oxygen as needed   - was on CPAP/BiPAP in hospital, CPM   - in-house pulmonology [id number].  UTI   -completed Bactrim 3/25   - +ESBL completed macrobid x 7 days on 7/1   - + E.coli 7/11 : started Cipro 250mg BID x 5 days on 7/14 -

## 2018-08-25 ENCOUNTER — SNF VISIT (OUTPATIENT)
Dept: INTERNAL MEDICINE CLINIC | Facility: SKILLED NURSING FACILITY | Age: 73
End: 2018-08-25

## 2018-08-25 DIAGNOSIS — D64.9 ANEMIA, UNSPECIFIED TYPE: ICD-10-CM

## 2018-08-25 DIAGNOSIS — R19.5 FECAL OCCULT BLOOD TEST POSITIVE: ICD-10-CM

## 2018-08-25 DIAGNOSIS — S70.11XD HEMATOMA OF RIGHT THIGH, SUBSEQUENT ENCOUNTER: ICD-10-CM

## 2018-08-25 PROCEDURE — 99309 SBSQ NF CARE MODERATE MDM 30: CPT | Performed by: NURSE PRACTITIONER

## 2018-08-25 NOTE — PROGRESS NOTES
Chin Huerta : 1945 Age 74year old female patient is admitted to [facility] for OWEN     Chief complaint: F/u R inner thigh abscess and anemia     HPI:   Patient is a 69-year female with medical history of Diabetes, Hyperlipidem total of 7 U PRBC between 4/3 and 4/5. Hgb was 8.2 on 4/6. Pt was seen by GI (Dr. Rosita Babb [facility])while at [facility] made for Cscope, EGD and capsule study. These tests were delayed due to re-occurrence of C.  Diff on 4/5 and pt was started on oral va mucous membranes pink and moist, PERRLA, EOMI, sclera anicteric, conjunctiva normal.   RESPIRATORY:clear to auscultation, no wheezes or rales. CARDIOVASCULAR: S1, S2, regular   ABDOMEN: Obese,normal active BS+, soft, non distended, nontender.  2 induratio cancer   -Seen by Dr. Dylon Meyer and thought she wasn't a surgical candidate so referred her ot Radiation/Oncology   - Seen by Dr. Chetna Cotton on 12/18 & 1/15.   - Pelvic MRI done @[facility] & seen by Dr. Tabby Martines 1/15.   -Unsuccessful tandem placement during adm on -CPM vanco 250mg daily until colonoscopy 6/25, will see ID specialist after colonoscopy for further taper recs. Discussed w/ pt to scheduled f/u appt w/ ID.   - currently vanco increased to QID dosing as pt was on Bactrim   12. Hypercapnic respiratory failu

## 2018-08-27 ENCOUNTER — TELEPHONE (OUTPATIENT)
Dept: INTERNAL MEDICINE CLINIC | Facility: CLINIC | Age: 73
End: 2018-08-27

## 2018-08-27 NOTE — TELEPHONE ENCOUNTER
[facility] will be faxing over request today for hospital bed for patient.

## 2018-08-28 ENCOUNTER — SNF/IP PROF CHARGE ONLY (OUTPATIENT)
Dept: INTERNAL MEDICINE CLINIC | Facility: CLINIC | Age: 73
End: 2018-08-28

## 2018-08-28 DIAGNOSIS — I27.20 PULMONARY HTN (HCC): ICD-10-CM

## 2018-08-28 DIAGNOSIS — Z85.42 HISTORY OF ENDOMETRIAL CANCER: ICD-10-CM

## 2018-08-28 DIAGNOSIS — E66.01 MORBID OBESITY (HCC): ICD-10-CM

## 2018-08-28 DIAGNOSIS — I10 ESSENTIAL HYPERTENSION: ICD-10-CM

## 2018-08-28 DIAGNOSIS — S92.345D CLOSED NONDISPLACED FRACTURE OF FOURTH METATARSAL BONE OF LEFT FOOT WITH ROUTINE HEALING: Chronic | ICD-10-CM

## 2018-08-28 DIAGNOSIS — C54.1 ENDOMETRIAL CANCER (HCC): ICD-10-CM

## 2018-08-28 DIAGNOSIS — D64.9 ANEMIA, UNSPECIFIED TYPE: ICD-10-CM

## 2018-08-28 DIAGNOSIS — E11.8 TYPE 2 DIABETES MELLITUS WITH COMPLICATION, WITHOUT LONG-TERM CURRENT USE OF INSULIN (HCC): ICD-10-CM

## 2018-08-28 DIAGNOSIS — S92.902A FOOT FRACTURE, LEFT, CLOSED, INITIAL ENCOUNTER: ICD-10-CM

## 2018-08-28 PROCEDURE — 99308 SBSQ NF CARE LOW MDM 20: CPT | Performed by: INTERNAL MEDICINE

## 2018-08-30 ENCOUNTER — SNF VISIT (OUTPATIENT)
Dept: INTERNAL MEDICINE CLINIC | Facility: SKILLED NURSING FACILITY | Age: 73
End: 2018-08-30

## 2018-08-30 DIAGNOSIS — Z02.9 DISCHARGE PLANNING ISSUES: ICD-10-CM

## 2018-08-30 DIAGNOSIS — Z86.19 H/O CLOSTRIDIUM DIFFICILE INFECTION: ICD-10-CM

## 2018-08-30 DIAGNOSIS — E66.01 MORBID OBESITY (HCC): ICD-10-CM

## 2018-08-30 DIAGNOSIS — C54.1 ENDOMETRIAL CANCER (HCC): ICD-10-CM

## 2018-08-30 DIAGNOSIS — Z09 FOLLOW UP: ICD-10-CM

## 2018-08-30 DIAGNOSIS — E11.8 TYPE 2 DIABETES MELLITUS WITH COMPLICATION, WITHOUT LONG-TERM CURRENT USE OF INSULIN (HCC): ICD-10-CM

## 2018-08-30 DIAGNOSIS — I50.32 CHRONIC DIASTOLIC CONGESTIVE HEART FAILURE (HCC): ICD-10-CM

## 2018-08-30 DIAGNOSIS — D64.9 ANEMIA, UNSPECIFIED TYPE: ICD-10-CM

## 2018-08-30 PROCEDURE — 99356 PROLONGED SERV,INPATIENT,1ST HR: CPT | Performed by: CLINICAL NURSE SPECIALIST

## 2018-08-30 PROCEDURE — 99310 SBSQ NF CARE HIGH MDM 45: CPT | Performed by: CLINICAL NURSE SPECIALIST

## 2018-08-30 NOTE — PROGRESS NOTES
Discharge Summary    Fernando Fitzpatrick : 1945 Age 74year old female patient is admitted to [facility] for OWEN     Chief complaint: Discharge education, F/u R inner thigh abscess, anemia, endometrial cancer, CHF, DM    [facility] stay:   However, the procedure was unsuccessful so it was removed due to the device perforating the uterus. Patient also has severe transfusion-dependent anemia with previous severe iron deficiency in the setting of stage I endometrial cancer.  On her 3/19Admission remained at  for rehab and frequent blood monitoring. She had weekly CBC checks and transfusions pending Hgb level. Ptw as going to [facility] [place] for transfusion when needed.   Once more physically independent and medically stable pt v obese & in no apparent distress. HEENT: atraumatic/normocephalic, mucous membranes pink and moist, PERRLA, EOMI, sclera anicteric, conjunctiva normal.   RESPIRATORY:clear to auscultation, no wheezes or rales.    CARDIOVASCULAR: S1, S2, regular   ABDOMEN: 9/5/18     2. Endometrial cancer   - s/p D&C on 11/28   - Uterine biopsy suggestive of Grade 1 endometrial cancer   -Seen by Dr. Adelaide Salmon and thought she wasn't a surgical candidate so referred her ot Radiation/Oncology   - Seen by Dr. Jory Hoang on 12/18 3/27   -completed another round of Vanco 125mg q6hrs x 14 days (4/19)   -Discontinue contact isolation 5/11   -CPM vanco 250mg daily until will see ID specialist after colonoscopy for further taper recs.  Discussed w/ pt to scheduled f/u appt w/ ID,Dr. Erin Gray

## 2018-09-04 ENCOUNTER — TELEPHONE (OUTPATIENT)
Dept: INTERNAL MEDICINE CLINIC | Facility: CLINIC | Age: 73
End: 2018-09-04

## 2018-09-04 DIAGNOSIS — E13.9 OTHER SPECIFIED DIABETES MELLITUS WITHOUT COMPLICATION, WITHOUT LONG-TERM CURRENT USE OF INSULIN (HCC): Primary | ICD-10-CM

## 2018-09-04 RX ORDER — BLOOD SUGAR DIAGNOSTIC
STRIP MISCELLANEOUS
Qty: 100 STRIP | Refills: 2 | Status: SHIPPED | OUTPATIENT
Start: 2018-09-04 | End: 2018-09-13

## 2018-09-04 NOTE — TELEPHONE ENCOUNTER
One touch verio strips sent to pharmacy per protocol.     Diabetes Medications  Protocol Criteria:  · Appointment scheduled in the past 6 months or the next 3 months  · A1C < 7.5 in the past 6 months  · Creatinine in the past 12 months  · Creatinine result

## 2018-09-04 NOTE — TELEPHONE ENCOUNTER
Patient called stating Dr seen her at [facility]. She is stating she needs test strips for her blood sugar monitor. Also stated she has a 1 touch verio. Any further information feel fee to contact her.

## 2018-09-04 NOTE — TELEPHONE ENCOUNTER
Spoke with patient. She checks her blood sugars three times a day before meals. Pharmacy made aware.

## 2018-09-06 ENCOUNTER — TELEPHONE (OUTPATIENT)
Dept: INTERNAL MEDICINE CLINIC | Facility: CLINIC | Age: 73
End: 2018-09-06

## 2018-09-06 NOTE — TELEPHONE ENCOUNTER
Spoke with Annika at [address] [facility]   Patient is wanting to switch to Dr. Jennifer Gruber daughter she has to make follow up appointment to see Dr. Royal Horton at office. Daughter is to call and schedule appt.

## 2018-09-13 ENCOUNTER — OFFICE VISIT (OUTPATIENT)
Dept: INTERNAL MEDICINE CLINIC | Facility: CLINIC | Age: 73
End: 2018-09-13
Payer: MEDICARE

## 2018-09-13 VITALS
DIASTOLIC BLOOD PRESSURE: 58 MMHG | HEART RATE: 71 BPM | OXYGEN SATURATION: 94 % | BODY MASS INDEX: 49 KG/M2 | SYSTOLIC BLOOD PRESSURE: 119 MMHG | WEIGHT: 279 LBS

## 2018-09-13 DIAGNOSIS — S92.345D CLOSED NONDISPLACED FRACTURE OF FOURTH METATARSAL BONE OF LEFT FOOT WITH ROUTINE HEALING: Chronic | ICD-10-CM

## 2018-09-13 DIAGNOSIS — E11.9 TYPE 2 DIABETES MELLITUS WITHOUT COMPLICATION, WITHOUT LONG-TERM CURRENT USE OF INSULIN (HCC): Chronic | ICD-10-CM

## 2018-09-13 DIAGNOSIS — E61.1 IRON DEFICIENCY: ICD-10-CM

## 2018-09-13 DIAGNOSIS — C54.1 ENDOMETRIAL CANCER (HCC): ICD-10-CM

## 2018-09-13 DIAGNOSIS — D50.0 IRON DEFICIENCY ANEMIA DUE TO CHRONIC BLOOD LOSS: Chronic | ICD-10-CM

## 2018-09-13 DIAGNOSIS — E66.01 MORBID OBESITY (HCC): ICD-10-CM

## 2018-09-13 DIAGNOSIS — I10 ESSENTIAL HYPERTENSION: Primary | ICD-10-CM

## 2018-09-13 DIAGNOSIS — I27.20 PULMONARY HYPERTENSION, MODERATE TO SEVERE (HCC): ICD-10-CM

## 2018-09-13 DIAGNOSIS — I50.31 DIASTOLIC CHF, ACUTE (HCC): ICD-10-CM

## 2018-09-13 PROCEDURE — 99214 OFFICE O/P EST MOD 30 MIN: CPT | Performed by: INTERNAL MEDICINE

## 2018-09-13 PROCEDURE — 1111F DSCHRG MED/CURRENT MED MERGE: CPT | Performed by: INTERNAL MEDICINE

## 2018-09-13 RX ORDER — POTASSIUM CHLORIDE 1.5 G/1.77G
20 POWDER, FOR SOLUTION ORAL DAILY
Qty: 30 PACKET | Refills: 2 | Status: SHIPPED | OUTPATIENT
Start: 2018-09-13 | End: 2018-11-08

## 2018-09-13 RX ORDER — ATORVASTATIN CALCIUM 40 MG/1
40 TABLET, FILM COATED ORAL NIGHTLY
Qty: 30 TABLET | Refills: 2 | Status: SHIPPED | OUTPATIENT
Start: 2018-09-13 | End: 2018-12-10

## 2018-09-13 RX ORDER — MONTELUKAST SODIUM 10 MG/1
10 TABLET ORAL NIGHTLY
Qty: 7 TABLET | Refills: 0 | Status: SHIPPED | OUTPATIENT
Start: 2018-09-13 | End: 2018-09-13

## 2018-09-13 RX ORDER — HYDRALAZINE HYDROCHLORIDE 25 MG/1
100 TABLET, FILM COATED ORAL EVERY 8 HOURS
Qty: 90 TABLET | Refills: 2 | Status: SHIPPED | OUTPATIENT
Start: 2018-09-13 | End: 2018-09-14

## 2018-09-13 RX ORDER — GLIMEPIRIDE 2 MG/1
2 TABLET ORAL
Qty: 30 TABLET | Refills: 2 | Status: SHIPPED | OUTPATIENT
Start: 2018-09-13 | End: 2018-12-19

## 2018-09-13 RX ORDER — BLOOD SUGAR DIAGNOSTIC
STRIP MISCELLANEOUS
Qty: 100 STRIP | Refills: 2 | Status: SHIPPED | OUTPATIENT
Start: 2018-09-13 | End: 2019-02-21

## 2018-09-13 RX ORDER — ISOSORBIDE MONONITRATE 30 MG/1
30 TABLET, EXTENDED RELEASE ORAL DAILY
Qty: 30 TABLET | Refills: 2 | Status: SHIPPED | OUTPATIENT
Start: 2018-09-13 | End: 2018-12-10

## 2018-09-13 RX ORDER — AMLODIPINE BESYLATE 10 MG/1
10 TABLET ORAL DAILY
Qty: 30 TABLET | Refills: 2 | Status: SHIPPED | OUTPATIENT
Start: 2018-09-13 | End: 2018-10-01

## 2018-09-13 RX ORDER — PANTOPRAZOLE SODIUM 40 MG/1
40 TABLET, DELAYED RELEASE ORAL
Qty: 30 TABLET | Refills: 2 | Status: SHIPPED | OUTPATIENT
Start: 2018-09-13 | End: 2018-12-10

## 2018-09-14 RX ORDER — MONTELUKAST SODIUM 10 MG/1
TABLET ORAL
Qty: 90 TABLET | Refills: 0 | Status: SHIPPED | OUTPATIENT
Start: 2018-09-14 | End: 2018-12-10

## 2018-09-14 RX ORDER — HYDRALAZINE HYDROCHLORIDE 25 MG/1
TABLET, FILM COATED ORAL
Qty: 1012 TABLET | Refills: 2 | Status: SHIPPED | OUTPATIENT
Start: 2018-09-14 | End: 2019-04-08

## 2018-09-19 ENCOUNTER — TELEPHONE (OUTPATIENT)
Dept: INTERNAL MEDICINE CLINIC | Facility: CLINIC | Age: 73
End: 2018-09-19

## 2018-09-20 NOTE — PROGRESS NOTES
HPI:    Patient ID: Ladan Form is a 74year old female.     HPI  Patient comes in today to establish care in office I know her from seeing her at [place] [id number] home patient has been there for long time with multiple medical problems initially endometri Disp: 30 tablet Rfl: 2   Enoxaparin Sodium 150 MG/ML Subcutaneous Solution Inject 120 mg into the skin every 12 (twelve) hours. Disp:  Rfl:    furosemide 40 MG Oral Tab Take 20 mg by mouth 2 (two) times daily.    Disp:  Rfl:    Vitamin B-12 250 MCG Oral T septicemia      Social History: Social History    Tobacco Use      Smoking status: Never Smoker      Smokeless tobacco: Never Used    Alcohol use: No    Drug use: No       PHYSICAL EXAM:    Physical Exam   Constitutional: She is oriented to person, muriel Sig: Take 1 tablet (40 mg total) by mouth every morning before breakfast.   • Isosorbide Mononitrate ER 30 MG Oral Tablet 24 Hr 30 tablet 2     Sig: Take 1 tablet (30 mg total) by mouth daily.    • glimepiride 2 MG Oral Tab 30 tablet 2     Sig: Take 1 tabl

## 2018-09-24 ENCOUNTER — TELEPHONE (OUTPATIENT)
Dept: INTERNAL MEDICINE CLINIC | Facility: CLINIC | Age: 73
End: 2018-09-24

## 2018-09-24 NOTE — TELEPHONE ENCOUNTER
Plan does not cover one touch verio test strips, call for [phone], # [phone]. Or call with change.

## 2018-09-28 ENCOUNTER — TELEPHONE (OUTPATIENT)
Dept: INTERNAL MEDICINE CLINIC | Facility: CLINIC | Age: 73
End: 2018-09-28

## 2018-10-01 ENCOUNTER — TELEPHONE (OUTPATIENT)
Dept: INTERNAL MEDICINE CLINIC | Facility: CLINIC | Age: 73
End: 2018-10-01

## 2018-10-01 RX ORDER — POTASSIUM CHLORIDE 1500 MG/1
1 TABLET, FILM COATED, EXTENDED RELEASE ORAL DAILY
Qty: 90 TABLET | Refills: 1 | Status: SHIPPED | OUTPATIENT
Start: 2018-10-01 | End: 2019-03-19

## 2018-10-01 RX ORDER — AMLODIPINE BESYLATE 10 MG/1
10 TABLET ORAL DAILY
Qty: 30 TABLET | Refills: 2 | Status: SHIPPED | OUTPATIENT
Start: 2018-10-01 | End: 2018-12-19

## 2018-10-01 NOTE — TELEPHONE ENCOUNTER
Received call from daughter, Faustino Agarwal, regarding potassium. Stated patient used to take tablet but medication ordered was powder and patient will not take it. [facility]. Pending.

## 2018-10-01 NOTE — TELEPHONE ENCOUNTER
Current Outpatient Medications:     Rfl: 0   Patient is completely out of medication     Rfl: 2      Rfl: 2      Rfl: 2      Rfl: 2      Rfl: 2      Rfl: 2      Rfl: 2   AmLODIPine Besylate 10 MG Oral Tab Take 1 tablet (10 mg total) by mouth daily.  Disp:

## 2018-10-04 ENCOUNTER — TELEPHONE (OUTPATIENT)
Dept: INTERNAL MEDICINE CLINIC | Facility: CLINIC | Age: 73
End: 2018-10-04

## 2018-10-04 DIAGNOSIS — E11.9 TYPE 2 DIABETES MELLITUS WITHOUT COMPLICATION, WITHOUT LONG-TERM CURRENT USE OF INSULIN (HCC): Primary | ICD-10-CM

## 2018-10-05 NOTE — TELEPHONE ENCOUNTER
Order has been faxed to [employer], pt states she has the one touch verio. Pt has been informed.

## 2018-10-10 ENCOUNTER — PATIENT MESSAGE (OUTPATIENT)
Dept: INTERNAL MEDICINE CLINIC | Facility: CLINIC | Age: 73
End: 2018-10-10

## 2018-10-10 DIAGNOSIS — R53.83 OTHER FATIGUE: ICD-10-CM

## 2018-10-10 DIAGNOSIS — R06.83 SNORING: ICD-10-CM

## 2018-10-10 DIAGNOSIS — G47.33 OSA (OBSTRUCTIVE SLEEP APNEA): Primary | ICD-10-CM

## 2018-10-10 NOTE — TELEPHONE ENCOUNTER
From: Cyndi Morse  To: Nelson Clements MD  Sent: 10/10/2018 12:39 PM CDT  Subject: Non-Urgent Medical Question    This message is being sent by Efrain Parsons on behalf of Cyndi Morse    At my last visit with Dr. Vianey Dang he said that he would order a home sl

## 2018-10-18 ENCOUNTER — TELEPHONE (OUTPATIENT)
Dept: INTERNAL MEDICINE CLINIC | Facility: CLINIC | Age: 73
End: 2018-10-18

## 2018-10-18 NOTE — TELEPHONE ENCOUNTER
Lucretia Philip from [employer] ambulance service calling form needs to be completed as to why she needed a stretcher to take her from nursing home to cancer center @ [facility] for blood transfusion, lab work and testing.  Faxing to [place] office     Service date was May 30th

## 2018-10-18 NOTE — TELEPHONE ENCOUNTER
ok No complaints No complaints No complaints No complaints No complaints No complaints No complaints No complaints No complaints No complaints No complaints No complaints No complaints No complaints No complaints No complaints No complaints No complaints No complaints No complaints No complaints No complaints No complaints No complaints No complaints

## 2018-10-19 ENCOUNTER — LAB REQUISITION (OUTPATIENT)
Dept: LAB | Facility: HOSPITAL | Age: 73
End: 2018-10-19
Payer: MEDICARE

## 2018-10-19 ENCOUNTER — TELEPHONE (OUTPATIENT)
Dept: INTERNAL MEDICINE CLINIC | Facility: CLINIC | Age: 73
End: 2018-10-19

## 2018-10-19 DIAGNOSIS — N39.0 URINARY TRACT INFECTION: ICD-10-CM

## 2018-10-19 DIAGNOSIS — R30.0 DYSURIA: Primary | ICD-10-CM

## 2018-10-19 PROCEDURE — 81001 URINALYSIS AUTO W/SCOPE: CPT | Performed by: INTERNAL MEDICINE

## 2018-10-19 PROCEDURE — 87086 URINE CULTURE/COLONY COUNT: CPT | Performed by: INTERNAL MEDICINE

## 2018-10-19 PROCEDURE — 87077 CULTURE AEROBIC IDENTIFY: CPT | Performed by: INTERNAL MEDICINE

## 2018-10-19 PROCEDURE — 87186 SC STD MICRODIL/AGAR DIL: CPT | Performed by: INTERNAL MEDICINE

## 2018-10-19 NOTE — TELEPHONE ENCOUNTER
Patient has been com planing of burning when urinating she is looking for an order for UA and culture

## 2018-10-22 RX ORDER — CIPROFLOXACIN 250 MG/1
250 TABLET, FILM COATED ORAL 2 TIMES DAILY
Qty: 10 TABLET | Refills: 0 | Status: SHIPPED | OUTPATIENT
Start: 2018-10-22 | End: 2018-10-27

## 2018-10-29 RX ORDER — CIPROFLOXACIN 250 MG/1
250 TABLET, FILM COATED ORAL 2 TIMES DAILY
Qty: 10 TABLET | Refills: 0 | Status: SHIPPED | OUTPATIENT
Start: 2018-10-29 | End: 2018-11-03

## 2018-11-07 ENCOUNTER — TELEPHONE (OUTPATIENT)
Dept: INTERNAL MEDICINE CLINIC | Facility: CLINIC | Age: 73
End: 2018-11-07

## 2018-11-07 NOTE — TELEPHONE ENCOUNTER
Patient is having retinol surgery with Dr Genevieve Cruz.  Elaina Faith from his office faxing orders to Dr Aisha England patient has pre surgery appointment Thursday November 8th at 5:30 pm.

## 2018-11-08 ENCOUNTER — TELEPHONE (OUTPATIENT)
Dept: INTERNAL MEDICINE CLINIC | Facility: CLINIC | Age: 73
End: 2018-11-08

## 2018-11-08 ENCOUNTER — OFFICE VISIT (OUTPATIENT)
Dept: INTERNAL MEDICINE CLINIC | Facility: CLINIC | Age: 73
End: 2018-11-08
Payer: MEDICARE

## 2018-11-08 VITALS
WEIGHT: 286.63 LBS | BODY MASS INDEX: 50.79 KG/M2 | HEART RATE: 76 BPM | HEIGHT: 63 IN | OXYGEN SATURATION: 98 % | TEMPERATURE: 98 F | SYSTOLIC BLOOD PRESSURE: 114 MMHG | DIASTOLIC BLOOD PRESSURE: 62 MMHG

## 2018-11-08 DIAGNOSIS — R06.83 SNORING: ICD-10-CM

## 2018-11-08 DIAGNOSIS — R30.0 DYSURIA: ICD-10-CM

## 2018-11-08 DIAGNOSIS — G47.30 SLEEP APNEA, UNSPECIFIED TYPE: ICD-10-CM

## 2018-11-08 DIAGNOSIS — M54.41 CHRONIC BILATERAL LOW BACK PAIN WITH BILATERAL SCIATICA: ICD-10-CM

## 2018-11-08 DIAGNOSIS — Z01.818 PRE-OP EVALUATION: Primary | ICD-10-CM

## 2018-11-08 DIAGNOSIS — M54.42 CHRONIC BILATERAL LOW BACK PAIN WITH BILATERAL SCIATICA: ICD-10-CM

## 2018-11-08 DIAGNOSIS — G89.29 CHRONIC BILATERAL LOW BACK PAIN WITH BILATERAL SCIATICA: ICD-10-CM

## 2018-11-08 PROCEDURE — 99214 OFFICE O/P EST MOD 30 MIN: CPT | Performed by: INTERNAL MEDICINE

## 2018-11-08 PROCEDURE — 93000 ELECTROCARDIOGRAM COMPLETE: CPT | Performed by: INTERNAL MEDICINE

## 2018-11-08 PROCEDURE — 81003 URINALYSIS AUTO W/O SCOPE: CPT | Performed by: INTERNAL MEDICINE

## 2018-11-08 PROCEDURE — 90653 IIV ADJUVANT VACCINE IM: CPT | Performed by: INTERNAL MEDICINE

## 2018-11-08 PROCEDURE — G0008 ADMIN INFLUENZA VIRUS VAC: HCPCS | Performed by: INTERNAL MEDICINE

## 2018-11-08 PROCEDURE — 36415 COLL VENOUS BLD VENIPUNCTURE: CPT | Performed by: INTERNAL MEDICINE

## 2018-11-08 RX ORDER — FUROSEMIDE 40 MG/1
20 TABLET ORAL 2 TIMES DAILY
Qty: 30 TABLET | Refills: 2 | Status: SHIPPED | OUTPATIENT
Start: 2018-11-08 | End: 2019-02-05

## 2018-11-09 NOTE — PROGRESS NOTES
HPI:    Patient ID: Cyndi Artist is a 74year old female. HPI  Today for preop clearance for eye surgery. Eye doctor wants her to have a preop and EKG.   Patient has had an echo last year was normal denies any chest pain or shortness of breath patient no 30 tablet Rfl: 2   Glucose Blood (ONETOUCH VERIO) In Vitro Strip Check as directed Disp: 100 strip Rfl: 2   atorvastatin 40 MG Oral Tab Take 1 tablet (40 mg total) by mouth nightly.  Disp: 30 tablet Rfl: 2   SAXagliptin HCl 2.5 MG Oral Tab Take 1 tablet (2. Normocephalic and atraumatic. Eyes: Conjunctivae are normal. Pupils are equal, round, and reactive to light. No scleral icterus. Neck: Normal range of motion. Neck supple. Cardiovascular: Normal rate and regular rhythm.    Pulmonary/Chest: Effort norm

## 2018-11-09 NOTE — TELEPHONE ENCOUNTER
Pt would like EKG and lab work results when they come in  sent to Dr Sally Lake at [facility] [address] [phone]

## 2018-11-09 NOTE — TELEPHONE ENCOUNTER
Please can you call [facility] or home medical to find out if they do Home sleep study or who does. For this patient,.

## 2018-11-13 ENCOUNTER — TELEPHONE (OUTPATIENT)
Dept: INTERNAL MEDICINE CLINIC | Facility: CLINIC | Age: 73
End: 2018-11-13

## 2018-12-11 RX ORDER — ISOSORBIDE MONONITRATE 30 MG/1
TABLET, EXTENDED RELEASE ORAL
Qty: 30 TABLET | Refills: 0 | Status: SHIPPED | OUTPATIENT
Start: 2018-12-11 | End: 2019-02-04

## 2018-12-11 RX ORDER — PANTOPRAZOLE SODIUM 40 MG/1
TABLET, DELAYED RELEASE ORAL
Qty: 30 TABLET | Refills: 0 | Status: SHIPPED | OUTPATIENT
Start: 2018-12-11 | End: 2019-02-04

## 2018-12-11 RX ORDER — MONTELUKAST SODIUM 10 MG/1
TABLET ORAL
Qty: 90 TABLET | Refills: 0 | Status: SHIPPED | OUTPATIENT
Start: 2018-12-11 | End: 2019-02-21

## 2018-12-11 RX ORDER — ATORVASTATIN CALCIUM 40 MG/1
TABLET, FILM COATED ORAL
Qty: 30 TABLET | Refills: 0 | Status: SHIPPED | OUTPATIENT
Start: 2018-12-11 | End: 2019-02-21

## 2018-12-17 RX ORDER — ISOSORBIDE MONONITRATE 30 MG/1
TABLET, EXTENDED RELEASE ORAL
Qty: 30 TABLET | Refills: 0 | Status: SHIPPED | OUTPATIENT
Start: 2018-12-17 | End: 2019-01-14

## 2018-12-17 RX ORDER — SAXAGLIPTIN 2.5 MG/1
TABLET, FILM COATED ORAL
Qty: 30 TABLET | Refills: 0 | Status: SHIPPED | OUTPATIENT
Start: 2018-12-17 | End: 2019-01-20

## 2018-12-19 RX ORDER — MONTELUKAST SODIUM 10 MG/1
TABLET ORAL
Qty: 90 TABLET | Refills: 0 | Status: SHIPPED | OUTPATIENT
Start: 2018-12-19 | End: 2019-03-19

## 2018-12-19 RX ORDER — AMLODIPINE BESYLATE 10 MG/1
TABLET ORAL
Qty: 30 TABLET | Refills: 0 | Status: SHIPPED | OUTPATIENT
Start: 2018-12-19 | End: 2019-01-28

## 2018-12-19 RX ORDER — ATORVASTATIN CALCIUM 40 MG/1
TABLET, FILM COATED ORAL
Qty: 30 TABLET | Refills: 0 | Status: SHIPPED | OUTPATIENT
Start: 2018-12-19 | End: 2019-01-18

## 2018-12-19 RX ORDER — GLIMEPIRIDE 2 MG/1
TABLET ORAL
Qty: 30 TABLET | Refills: 0 | Status: SHIPPED | OUTPATIENT
Start: 2018-12-19 | End: 2019-01-18

## 2018-12-19 RX ORDER — PANTOPRAZOLE SODIUM 40 MG/1
TABLET, DELAYED RELEASE ORAL
Qty: 30 TABLET | Refills: 0 | Status: SHIPPED | OUTPATIENT
Start: 2018-12-19 | End: 2019-01-18

## 2019-01-14 RX ORDER — ISOSORBIDE MONONITRATE 30 MG/1
TABLET, EXTENDED RELEASE ORAL
Qty: 30 TABLET | Refills: 0 | Status: SHIPPED | OUTPATIENT
Start: 2019-01-14 | End: 2019-02-21

## 2019-01-18 RX ORDER — GLIMEPIRIDE 2 MG/1
TABLET ORAL
Qty: 30 TABLET | Refills: 0 | Status: SHIPPED | OUTPATIENT
Start: 2019-01-18 | End: 2019-02-04

## 2019-01-18 RX ORDER — ATORVASTATIN CALCIUM 40 MG/1
TABLET, FILM COATED ORAL
Qty: 30 TABLET | Refills: 0 | Status: SHIPPED | OUTPATIENT
Start: 2019-01-18 | End: 2019-02-04

## 2019-01-18 RX ORDER — PANTOPRAZOLE SODIUM 40 MG/1
TABLET, DELAYED RELEASE ORAL
Qty: 30 TABLET | Refills: 0 | Status: SHIPPED | OUTPATIENT
Start: 2019-01-18 | End: 2019-02-21

## 2019-01-21 RX ORDER — SAXAGLIPTIN 2.5 MG/1
TABLET, FILM COATED ORAL
Qty: 30 TABLET | Refills: 0 | Status: SHIPPED | OUTPATIENT
Start: 2019-01-21 | End: 2019-02-04

## 2019-01-28 RX ORDER — AMLODIPINE BESYLATE 10 MG/1
TABLET ORAL
Qty: 30 TABLET | Refills: 0 | Status: SHIPPED | OUTPATIENT
Start: 2019-01-28 | End: 2019-02-04

## 2019-02-02 ENCOUNTER — TELEPHONE (OUTPATIENT)
Dept: INTERNAL MEDICINE CLINIC | Facility: CLINIC | Age: 74
End: 2019-02-02

## 2019-02-02 NOTE — TELEPHONE ENCOUNTER
Current Outpatient Medications:   •  AMLODIPINE BESYLATE 10 MG Oral Tab, TAKE 1 TABLET BY MOUTH DAILY, Disp: 30 tablet, Rfl: 0  •  ONGLYZA 2.5 MG Oral Tab, TAKE 1 TABLET BY MOUTH DAILY WITH BREAKFAST, Disp: 30 tablet, Rfl: 0  •  ATORVASTATIN 40 MG Oral T

## 2019-02-04 RX ORDER — ATORVASTATIN CALCIUM 40 MG/1
TABLET, FILM COATED ORAL
Qty: 90 TABLET | Refills: 0 | OUTPATIENT
Start: 2019-02-04

## 2019-02-04 RX ORDER — ISOSORBIDE MONONITRATE 30 MG/1
30 TABLET, EXTENDED RELEASE ORAL
Qty: 30 TABLET | Refills: 0 | Status: SHIPPED | OUTPATIENT
Start: 2019-02-04 | End: 2019-02-27

## 2019-02-04 RX ORDER — PANTOPRAZOLE SODIUM 40 MG/1
TABLET, DELAYED RELEASE ORAL
Qty: 90 TABLET | Refills: 0 | OUTPATIENT
Start: 2019-02-04

## 2019-02-04 RX ORDER — GLIMEPIRIDE 2 MG/1
TABLET ORAL
Qty: 90 TABLET | Refills: 0 | OUTPATIENT
Start: 2019-02-04

## 2019-02-04 RX ORDER — ISOSORBIDE MONONITRATE 30 MG/1
TABLET, EXTENDED RELEASE ORAL
Qty: 90 TABLET | Refills: 0 | OUTPATIENT
Start: 2019-02-04

## 2019-02-04 RX ORDER — AMLODIPINE BESYLATE 10 MG/1
10 TABLET ORAL
Qty: 30 TABLET | Refills: 0 | Status: SHIPPED | OUTPATIENT
Start: 2019-02-04 | End: 2019-02-06

## 2019-02-04 RX ORDER — GLIMEPIRIDE 2 MG/1
TABLET ORAL
Qty: 30 TABLET | Refills: 0 | Status: SHIPPED | OUTPATIENT
Start: 2019-02-04 | End: 2019-03-05

## 2019-02-04 RX ORDER — ATORVASTATIN CALCIUM 40 MG/1
TABLET, FILM COATED ORAL
Qty: 30 TABLET | Refills: 0 | Status: SHIPPED | OUTPATIENT
Start: 2019-02-04 | End: 2019-04-08

## 2019-02-04 RX ORDER — PANTOPRAZOLE SODIUM 40 MG/1
TABLET, DELAYED RELEASE ORAL
Qty: 30 TABLET | Refills: 0 | Status: SHIPPED | OUTPATIENT
Start: 2019-02-04 | End: 2019-04-08

## 2019-02-05 RX ORDER — FUROSEMIDE 40 MG/1
TABLET ORAL
Qty: 30 TABLET | Refills: 0 | Status: SHIPPED | OUTPATIENT
Start: 2019-02-05 | End: 2019-05-20

## 2019-02-06 RX ORDER — AMLODIPINE BESYLATE 10 MG/1
TABLET ORAL
Qty: 90 TABLET | Refills: 0 | Status: SHIPPED | OUTPATIENT
Start: 2019-02-06 | End: 2019-07-25

## 2019-02-21 ENCOUNTER — OFFICE VISIT (OUTPATIENT)
Dept: INTERNAL MEDICINE CLINIC | Facility: CLINIC | Age: 74
End: 2019-02-21
Payer: MEDICARE

## 2019-02-21 VITALS
OXYGEN SATURATION: 98 % | SYSTOLIC BLOOD PRESSURE: 122 MMHG | TEMPERATURE: 98 F | BODY MASS INDEX: 52.38 KG/M2 | DIASTOLIC BLOOD PRESSURE: 52 MMHG | HEART RATE: 76 BPM | HEIGHT: 62 IN | WEIGHT: 284.63 LBS

## 2019-02-21 DIAGNOSIS — I50.32 CHRONIC DIASTOLIC CONGESTIVE HEART FAILURE (HCC): ICD-10-CM

## 2019-02-21 DIAGNOSIS — I10 ESSENTIAL HYPERTENSION: Primary | ICD-10-CM

## 2019-02-21 DIAGNOSIS — L98.9 SKIN LESIONS: ICD-10-CM

## 2019-02-21 DIAGNOSIS — M79.672 LEFT FOOT PAIN: ICD-10-CM

## 2019-02-21 DIAGNOSIS — E66.01 MORBID OBESITY (HCC): ICD-10-CM

## 2019-02-21 DIAGNOSIS — D50.0 IRON DEFICIENCY ANEMIA DUE TO CHRONIC BLOOD LOSS: ICD-10-CM

## 2019-02-21 DIAGNOSIS — E11.9 TYPE 2 DIABETES MELLITUS WITHOUT COMPLICATION, WITHOUT LONG-TERM CURRENT USE OF INSULIN (HCC): ICD-10-CM

## 2019-02-21 PROCEDURE — 99214 OFFICE O/P EST MOD 30 MIN: CPT | Performed by: INTERNAL MEDICINE

## 2019-02-21 RX ORDER — LANCETS
EACH MISCELLANEOUS
Qty: 100 EACH | Refills: 2 | Status: SHIPPED | OUTPATIENT
Start: 2019-02-21

## 2019-02-21 RX ORDER — BLOOD SUGAR DIAGNOSTIC
STRIP MISCELLANEOUS
Qty: 100 STRIP | Refills: 2 | Status: CANCELLED | OUTPATIENT
Start: 2019-02-21

## 2019-02-22 NOTE — PROGRESS NOTES
HPI:    Patient ID: Cassie Esqueda is a 74year old female. HPI  Comes in today with daughter for follow-up she is complaining today of left foot pain lately she is felt some Duhart spots on her foot feels like she is walking on rocks.   Patient has chronic NIGHTLY Disp: 90 tablet Rfl: 0   Potassium Chloride ER 20 MEQ Oral Tab CR Take 1 tablet by mouth daily.  Disp: 90 tablet Rfl: 1   HYDRALAZINE HCL 25 MG Oral Tab TAKE 4 TABLETS BY MOUTH EVERY 8 HOURS Disp: 1012 tablet Rfl: 2   Vitamin B-12 250 MCG Oral Tab T light. No scleral icterus. Neck: Normal range of motion. Neck supple. Cardiovascular: Normal rate and regular rhythm. Pulmonary/Chest: Effort normal and breath sounds normal.   Abdominal: Soft.  Bowel sounds are normal.   Musculoskeletal: Normal range

## 2019-02-27 RX ORDER — ISOSORBIDE MONONITRATE 30 MG/1
TABLET, EXTENDED RELEASE ORAL
Qty: 30 TABLET | Refills: 2 | Status: SHIPPED | OUTPATIENT
Start: 2019-02-27 | End: 2019-04-08

## 2019-03-05 ENCOUNTER — MED REC SCAN ONLY (OUTPATIENT)
Dept: INTERNAL MEDICINE CLINIC | Facility: CLINIC | Age: 74
End: 2019-03-05

## 2019-03-06 ENCOUNTER — TELEPHONE (OUTPATIENT)
Dept: INTERNAL MEDICINE CLINIC | Facility: CLINIC | Age: 74
End: 2019-03-06

## 2019-03-06 RX ORDER — GLIMEPIRIDE 2 MG/1
TABLET ORAL
Qty: 30 TABLET | Refills: 0 | Status: SHIPPED | OUTPATIENT
Start: 2019-03-06 | End: 2019-04-08

## 2019-03-06 NOTE — TELEPHONE ENCOUNTER
Dayne Acevedo. Called [phone], stating that they received the CMN form for this patient at their office.   Please re-fax to the correct number [phone]

## 2019-03-19 RX ORDER — POTASSIUM CHLORIDE 20 MEQ/1
TABLET, EXTENDED RELEASE ORAL
Qty: 90 TABLET | Refills: 0 | Status: SHIPPED | OUTPATIENT
Start: 2019-03-19 | End: 2019-04-08

## 2019-03-19 RX ORDER — MONTELUKAST SODIUM 10 MG/1
TABLET ORAL
Qty: 90 TABLET | Refills: 0 | Status: SHIPPED | OUTPATIENT
Start: 2019-03-19 | End: 2019-04-29

## 2019-03-25 ENCOUNTER — OFFICE VISIT (OUTPATIENT)
Dept: PODIATRY CLINIC | Facility: CLINIC | Age: 74
End: 2019-03-25
Payer: MEDICARE

## 2019-03-25 DIAGNOSIS — L60.0 ONYCHOCRYPTOSIS: ICD-10-CM

## 2019-03-25 DIAGNOSIS — L84 CALLUS OF FOOT: ICD-10-CM

## 2019-03-25 DIAGNOSIS — B35.1 ONYCHOMYCOSIS: ICD-10-CM

## 2019-03-25 DIAGNOSIS — M76.822 POSTERIOR TIBIAL TENDON DYSFUNCTION (PTTD) OF LEFT LOWER EXTREMITY: Primary | ICD-10-CM

## 2019-03-25 DIAGNOSIS — E11.8 TYPE 2 DIABETES MELLITUS WITH COMPLICATION, WITHOUT LONG-TERM CURRENT USE OF INSULIN (HCC): ICD-10-CM

## 2019-03-25 PROCEDURE — 99203 OFFICE O/P NEW LOW 30 MIN: CPT | Performed by: PODIATRIST

## 2019-03-25 NOTE — PROGRESS NOTES
Normaga Artist is a 74year old female. Patient presents with:  Consult: DM foot care, left foot painful callus -- Saw Dr. Vianey Dang on 02/21/19. Last A1c was 8.1 on 02/09/18. Rates pain 4-5/10 while standing. BG was not checked today.          HPI:   Patient pres topically.  Disp:  Rfl:       Past Medical History:   Diagnosis Date   • Back pain    • Cancer ([facility] [address]     uterine cancerr   • Congestive heart disease ([facility]    • COPD (chronic obstructive pulmonary disease) ([facility]    • Diabetes (HCC)    • Essential hypertensi for her to walk on. Her toenails 1-5 are thickened yellowed and dystrophic with subungual debris detritus and hyperkeratosis present. 2. Vascular: The patient has palpable dorsalis pedis and posterior tibial pulses bilateral feet   3.  Neurologic: Patien

## 2019-03-26 RX ORDER — ATORVASTATIN CALCIUM 40 MG/1
TABLET, FILM COATED ORAL
Qty: 30 TABLET | Refills: 0 | Status: SHIPPED | OUTPATIENT
Start: 2019-03-26 | End: 2019-07-25

## 2019-03-30 ENCOUNTER — LAB ENCOUNTER (OUTPATIENT)
Dept: LAB | Age: 74
End: 2019-03-30
Attending: INTERNAL MEDICINE
Payer: MEDICARE

## 2019-03-30 DIAGNOSIS — I50.32 CHRONIC DIASTOLIC CONGESTIVE HEART FAILURE (HCC): ICD-10-CM

## 2019-03-30 DIAGNOSIS — E66.01 MORBID OBESITY (HCC): ICD-10-CM

## 2019-03-30 DIAGNOSIS — I10 ESSENTIAL HYPERTENSION: ICD-10-CM

## 2019-03-30 DIAGNOSIS — M79.672 LEFT FOOT PAIN: ICD-10-CM

## 2019-03-30 DIAGNOSIS — E11.9 TYPE 2 DIABETES MELLITUS WITHOUT COMPLICATION, WITHOUT LONG-TERM CURRENT USE OF INSULIN (HCC): ICD-10-CM

## 2019-03-30 DIAGNOSIS — D50.0 IRON DEFICIENCY ANEMIA DUE TO CHRONIC BLOOD LOSS: ICD-10-CM

## 2019-03-30 DIAGNOSIS — L98.9 SKIN LESIONS: ICD-10-CM

## 2019-03-30 LAB
BACTERIA UR QL AUTO: NEGATIVE /HPF
BILIRUB UR QL: NEGATIVE
CLARITY UR: CLEAR
COLOR UR: YELLOW
GLUCOSE UR-MCNC: NEGATIVE MG/DL
HGB UR QL STRIP.AUTO: NEGATIVE
KETONES UR-MCNC: NEGATIVE MG/DL
NITRITE UR QL STRIP.AUTO: NEGATIVE
PH UR: 5 [PH] (ref 5–8)
PROT UR-MCNC: 30 MG/DL
RBC #/AREA URNS AUTO: 1 /HPF
SP GR UR STRIP: 1.02 (ref 1–1.03)
UROBILINOGEN UR STRIP-ACNC: <2
VIT C UR-MCNC: NEGATIVE MG/DL
WBC #/AREA URNS AUTO: 16 /HPF

## 2019-03-30 PROCEDURE — 81001 URINALYSIS AUTO W/SCOPE: CPT | Performed by: INTERNAL MEDICINE

## 2019-03-30 PROCEDURE — 82043 UR ALBUMIN QUANTITATIVE: CPT

## 2019-03-30 PROCEDURE — 85025 COMPLETE CBC W/AUTO DIFF WBC: CPT

## 2019-03-30 PROCEDURE — 36415 COLL VENOUS BLD VENIPUNCTURE: CPT

## 2019-03-30 PROCEDURE — 83036 HEMOGLOBIN GLYCOSYLATED A1C: CPT

## 2019-03-30 PROCEDURE — 82570 ASSAY OF URINE CREATININE: CPT

## 2019-03-30 PROCEDURE — 87086 URINE CULTURE/COLONY COUNT: CPT | Performed by: INTERNAL MEDICINE

## 2019-03-30 PROCEDURE — 80061 LIPID PANEL: CPT

## 2019-03-30 PROCEDURE — 85060 BLOOD SMEAR INTERPRETATION: CPT

## 2019-04-03 ENCOUNTER — OFFICE VISIT (OUTPATIENT)
Dept: DERMATOLOGY CLINIC | Facility: CLINIC | Age: 74
End: 2019-04-03
Payer: MEDICARE

## 2019-04-03 DIAGNOSIS — L57.0 AK (ACTINIC KERATOSIS): Primary | ICD-10-CM

## 2019-04-03 DIAGNOSIS — D23.4 BENIGN NEOPLASM OF SCALP AND SKIN OF NECK: ICD-10-CM

## 2019-04-03 DIAGNOSIS — D22.9 MULTIPLE NEVI: ICD-10-CM

## 2019-04-03 DIAGNOSIS — D23.60 BENIGN NEOPLASM OF SKIN OF UPPER LIMB, INCLUDING SHOULDER, UNSPECIFIED LATERALITY: ICD-10-CM

## 2019-04-03 DIAGNOSIS — L81.4 LENTIGO: ICD-10-CM

## 2019-04-03 DIAGNOSIS — D23.30 BENIGN NEOPLASM OF SKIN OF FACE: ICD-10-CM

## 2019-04-03 DIAGNOSIS — L82.1 SEBORRHEIC KERATOSES: ICD-10-CM

## 2019-04-03 DIAGNOSIS — D23.5 BENIGN NEOPLASM OF SKIN OF TRUNK, EXCEPT SCROTUM: ICD-10-CM

## 2019-04-03 PROCEDURE — 99202 OFFICE O/P NEW SF 15 MIN: CPT | Performed by: DERMATOLOGY

## 2019-04-03 PROCEDURE — 17003 DESTRUCT PREMALG LES 2-14: CPT | Performed by: DERMATOLOGY

## 2019-04-03 PROCEDURE — 17000 DESTRUCT PREMALG LESION: CPT | Performed by: DERMATOLOGY

## 2019-04-08 RX ORDER — POTASSIUM CHLORIDE 20 MEQ/1
20 TABLET, EXTENDED RELEASE ORAL
Qty: 90 TABLET | Refills: 0 | Status: SHIPPED | OUTPATIENT
Start: 2019-04-08 | End: 2019-07-04

## 2019-04-08 RX ORDER — ISOSORBIDE MONONITRATE 30 MG/1
30 TABLET, EXTENDED RELEASE ORAL DAILY
Qty: 30 TABLET | Refills: 2 | Status: SHIPPED | OUTPATIENT
Start: 2019-04-08 | End: 2019-07-04

## 2019-04-08 RX ORDER — GLIMEPIRIDE 2 MG/1
TABLET ORAL
Qty: 90 TABLET | Refills: 0 | Status: SHIPPED | OUTPATIENT
Start: 2019-04-08 | End: 2019-07-04

## 2019-04-08 RX ORDER — PANTOPRAZOLE SODIUM 40 MG/1
TABLET, DELAYED RELEASE ORAL
Qty: 90 TABLET | Refills: 0 | Status: SHIPPED | OUTPATIENT
Start: 2019-04-08 | End: 2019-07-04

## 2019-04-08 RX ORDER — HYDRALAZINE HYDROCHLORIDE 25 MG/1
25 TABLET, FILM COATED ORAL 4 TIMES DAILY
Qty: 120 TABLET | Refills: 3 | Status: SHIPPED | OUTPATIENT
Start: 2019-04-08 | End: 2019-08-11

## 2019-04-08 RX ORDER — ATORVASTATIN CALCIUM 40 MG/1
TABLET, FILM COATED ORAL
Qty: 30 TABLET | Refills: 0 | Status: SHIPPED | OUTPATIENT
Start: 2019-04-08 | End: 2019-05-05

## 2019-04-08 NOTE — TELEPHONE ENCOUNTER
Passed protocol  Diabetes Medications  Protocol Criteria:  · Appointment scheduled in the past 6 months or the next 3 months  · A1C < 7.5 in the past 6 months  · Creatinine in the past 12 months  · Creatinine result < 1.5   Recent Outpatient Visits

## 2019-04-08 NOTE — TELEPHONE ENCOUNTER
Atorvastatin failed protocol  Cholesterol Medications  Protocol Criteria:  · Appointment scheduled in the past 12 months or in the next 3 months  · ALT & LDL on file in the past 12 months  · ALT result < 80  · LDL result <130   Recent Outpatient Visits

## 2019-04-08 NOTE — TELEPHONE ENCOUNTER
Passed protocol       Hypertensive Medications  Protocol Criteria:  · Appointment scheduled in the past 6 months or in the next 3 months  · BMP or CMP in the past 12 months  · Creatinine result < 2  Recent Outpatient Visits            5 days ago     [place]

## 2019-04-08 NOTE — TELEPHONE ENCOUNTER
No Protocol for Isororbide and Potassium Chloride   Please review   Refill Protocol Appointment Criteria  · Appointment scheduled in the past 12 months or in the next 3 months  Recent Outpatient Visits            5 days ago     [facility]

## 2019-04-15 NOTE — PROGRESS NOTES
Vinh Duffy is a 74year old female. HPI:     CC:  Patient presents with:  Derm Problem: new pt. presents with multiple itchy, crusty lesions to face - brothers with unknown type of skin CA. Pt refused full body.          Allergies:  Acetaminophen    HISTO as needed for Muscle spasms. Disp:  Rfl:    acetaminophen 325 MG Oral Tab Take 325 mg by mouth every 6 (six) hours as needed for Pain. Disp:  Rfl:    hydrALAzine HCl 25 MG Oral Tab Take 1 tablet (25 mg total) by mouth 4 (four) times daily.  Disp: 120 tabl Social Needs      Financial resource strain: Not on file      Food insecurity:        Worry: Not on file        Inability: Not on file      Transportation needs:        Medical: Not on file        Non-medical: Not on file    Tobacco Use      Smoking statu above. No fevers, chills, night sweats, unusual sun sensitivity. No other skin complaints. History, medications, allergies reviewed as noted. Physical Examination:     Well-developed well-nourished patient alert oriented in no acute distress. grid.  Instructions reviewed at length. Benign nevi, seborrheic  keratoses, cherry angiomas:  Reassurance regarding other benign skin lesions. Signs and symptoms of skin cancer, ABCDE's of melanoma discussed with patient.  Sunscreen use, sun protection, s

## 2019-04-15 NOTE — TELEPHONE ENCOUNTER
Refill passed per [facility], [facility] protocol.   Diabetes Medications  Protocol Criteria:  · Appointment scheduled in the past 6 months or the next 3 months  · A1C < 7.5 in the past 6 months  · Creatinine in the past 12 months  · Creatinine result < 1.5   Rece

## 2019-04-29 RX ORDER — MONTELUKAST SODIUM 10 MG/1
10 TABLET ORAL NIGHTLY
Qty: 90 TABLET | Refills: 1 | Status: SHIPPED | OUTPATIENT
Start: 2019-04-29

## 2019-04-29 NOTE — TELEPHONE ENCOUNTER
Refill passed per [facility], [facility] protocol.   Refill Protocol Appointment Criteria  · Appointment scheduled in the past 6 months or in the next 3 months  Recent Outpatient Visits            3 weeks ago AK (actinic keratosis)    Palma Quiros

## 2019-05-06 RX ORDER — ATORVASTATIN CALCIUM 40 MG/1
TABLET, FILM COATED ORAL
Qty: 90 TABLET | Refills: 1 | Status: SHIPPED | OUTPATIENT
Start: 2019-05-06

## 2019-05-06 NOTE — TELEPHONE ENCOUNTER
Please review; protocol failed.     Requested Prescriptions     Pending Prescriptions Disp Refills   • ATORVASTATIN 40 MG Oral Tab [Pharmacy Med Name: ATORVASTATIN 40MG TABLETS] 30 tablet 0     Sig: TAKE 1 TABLET BY MOUTH EVERY NIGHT         Recent Visits

## 2019-05-08 RX ORDER — AMLODIPINE BESYLATE 10 MG/1
TABLET ORAL
Qty: 90 TABLET | Refills: 1 | Status: SHIPPED | OUTPATIENT
Start: 2019-05-08

## 2019-05-08 NOTE — TELEPHONE ENCOUNTER
Refill passed per [facility], [facility] protocol.   Hypertensive Medications  Protocol Criteria:  · Appointment scheduled in the past 6 months or in the next 3 months  · BMP or CMP in the past 12 months  · Creatinine result < 2  Recent Outpatient Visits

## 2019-05-20 RX ORDER — FUROSEMIDE 40 MG/1
40 TABLET ORAL 2 TIMES DAILY
Qty: 90 TABLET | Refills: 1 | Status: SHIPPED | OUTPATIENT
Start: 2019-05-20

## 2019-05-20 NOTE — TELEPHONE ENCOUNTER
Refill passed per [facility] protocol.   Hypertensive Medications  Protocol Criteria:  · Appointment scheduled in the past 6 months or in the next 3 months  · BMP or CMP in the past 12 months  · Creatinine result < 2  Recent Outpatient Visits

## 2019-07-05 RX ORDER — PANTOPRAZOLE SODIUM 40 MG/1
TABLET, DELAYED RELEASE ORAL
Qty: 90 TABLET | Refills: 1 | Status: SHIPPED | OUTPATIENT
Start: 2019-07-05 | End: 2019-07-31

## 2019-07-05 RX ORDER — POTASSIUM CHLORIDE 20 MEQ/1
TABLET, EXTENDED RELEASE ORAL
Qty: 90 TABLET | Refills: 1 | Status: SHIPPED | OUTPATIENT
Start: 2019-07-05

## 2019-07-05 RX ORDER — GLIMEPIRIDE 2 MG/1
TABLET ORAL
Qty: 90 TABLET | Refills: 1 | Status: SHIPPED | OUTPATIENT
Start: 2019-07-05 | End: 2019-09-02

## 2019-07-05 RX ORDER — ISOSORBIDE MONONITRATE 30 MG/1
TABLET, EXTENDED RELEASE ORAL
Qty: 90 TABLET | Refills: 1 | Status: SHIPPED | OUTPATIENT
Start: 2019-07-05

## 2019-07-05 NOTE — TELEPHONE ENCOUNTER
Refill passed per [facility], [facility] protocol.   Diabetes Medications  Protocol Criteria:  · Appointment scheduled in the past 6 months or the next 3 months  · A1C < 7.5 in the past 6 months  · Creatinine in the past 12 months  · Creatinine result < 1.5   Rece Future Appointments       Provider Department Appt Notes    In 1 week Mary Fernandez MD [facility], [facility], [address] [facility], [facility]

## 2019-07-05 NOTE — TELEPHONE ENCOUNTER
Review pended refill request as it does not fall under a protocol.     Last Rx: 4/8/19  LOV: 2/21/19

## 2019-07-25 ENCOUNTER — OFFICE VISIT (OUTPATIENT)
Dept: INTERNAL MEDICINE CLINIC | Facility: CLINIC | Age: 74
End: 2019-07-25
Payer: MEDICARE

## 2019-07-25 VITALS
HEART RATE: 92 BPM | TEMPERATURE: 99 F | DIASTOLIC BLOOD PRESSURE: 48 MMHG | HEIGHT: 61 IN | BODY MASS INDEX: 53.43 KG/M2 | OXYGEN SATURATION: 96 % | SYSTOLIC BLOOD PRESSURE: 100 MMHG | WEIGHT: 283 LBS

## 2019-07-25 DIAGNOSIS — Z00.00 MEDICARE ANNUAL WELLNESS VISIT, SUBSEQUENT: Primary | ICD-10-CM

## 2019-07-25 DIAGNOSIS — E61.1 IRON DEFICIENCY: ICD-10-CM

## 2019-07-25 DIAGNOSIS — G89.29 CHRONIC BILATERAL LOW BACK PAIN WITH BILATERAL SCIATICA: ICD-10-CM

## 2019-07-25 DIAGNOSIS — E11.9 TYPE 2 DIABETES MELLITUS WITHOUT COMPLICATION, WITHOUT LONG-TERM CURRENT USE OF INSULIN (HCC): ICD-10-CM

## 2019-07-25 DIAGNOSIS — Z78.0 POSTMENOPAUSAL: ICD-10-CM

## 2019-07-25 DIAGNOSIS — G62.89 OTHER POLYNEUROPATHY: ICD-10-CM

## 2019-07-25 DIAGNOSIS — I10 ESSENTIAL HYPERTENSION: ICD-10-CM

## 2019-07-25 DIAGNOSIS — S92.345D CLOSED NONDISPLACED FRACTURE OF FOURTH METATARSAL BONE OF LEFT FOOT WITH ROUTINE HEALING: ICD-10-CM

## 2019-07-25 DIAGNOSIS — M54.41 CHRONIC BILATERAL LOW BACK PAIN WITH BILATERAL SCIATICA: ICD-10-CM

## 2019-07-25 DIAGNOSIS — I27.20 PULMONARY HTN (HCC): ICD-10-CM

## 2019-07-25 DIAGNOSIS — Z13.1 SCREENING FOR DIABETES MELLITUS (DM): ICD-10-CM

## 2019-07-25 DIAGNOSIS — Z12.31 SCREENING MAMMOGRAM, ENCOUNTER FOR: ICD-10-CM

## 2019-07-25 DIAGNOSIS — Z85.42 HISTORY OF ENDOMETRIAL CANCER: ICD-10-CM

## 2019-07-25 DIAGNOSIS — G47.33 OSA (OBSTRUCTIVE SLEEP APNEA): ICD-10-CM

## 2019-07-25 DIAGNOSIS — M54.42 CHRONIC BILATERAL LOW BACK PAIN WITH BILATERAL SCIATICA: ICD-10-CM

## 2019-07-25 DIAGNOSIS — C54.1 ENDOMETRIAL CANCER (HCC): ICD-10-CM

## 2019-07-25 DIAGNOSIS — R20.0 NUMBNESS AND TINGLING OF BOTH FEET: ICD-10-CM

## 2019-07-25 DIAGNOSIS — E66.01 MORBID OBESITY (HCC): ICD-10-CM

## 2019-07-25 DIAGNOSIS — R20.2 NUMBNESS AND TINGLING OF BOTH FEET: ICD-10-CM

## 2019-07-25 DIAGNOSIS — I50.31 DIASTOLIC CHF, ACUTE (HCC): ICD-10-CM

## 2019-07-25 DIAGNOSIS — Z00.00 ENCOUNTER FOR ANNUAL HEALTH EXAMINATION: ICD-10-CM

## 2019-07-25 LAB
CHOLEST SMN-MCNC: 102 MG/DL (ref ?–200)
EST. AVERAGE GLUCOSE BLD GHB EST-MCNC: 105 MG/DL (ref 68–126)
EST. AVERAGE GLUCOSE BLD GHB EST-MCNC: 108 MG/DL (ref 68–126)
HBA1C MFR BLD HPLC: 5.3 % (ref ?–5.7)
HBA1C MFR BLD HPLC: 5.4 % (ref ?–5.7)
HDLC SERPL-MCNC: 32 MG/DL (ref 40–59)
LDLC SERPL CALC-MCNC: 42 MG/DL (ref ?–100)
NONHDLC SERPL-MCNC: 70 MG/DL (ref ?–130)
PATIENT FASTING: NO
TRIGL SERPL-MCNC: 142 MG/DL (ref 30–149)
VIT B12 SERPL-MCNC: 608 PG/ML (ref 193–986)
VLDLC SERPL CALC-MCNC: 28 MG/DL (ref 0–30)

## 2019-07-25 PROCEDURE — G0439 PPPS, SUBSEQ VISIT: HCPCS | Performed by: INTERNAL MEDICINE

## 2019-07-25 RX ORDER — CYCLOBENZAPRINE HCL 10 MG
10 TABLET ORAL 3 TIMES DAILY PRN
Qty: 90 TABLET | Refills: 0 | Status: SHIPPED | OUTPATIENT
Start: 2019-07-25

## 2019-07-25 RX ORDER — GABAPENTIN 100 MG/1
100 CAPSULE ORAL NIGHTLY
Qty: 30 CAPSULE | Refills: 2 | Status: SHIPPED | OUTPATIENT
Start: 2019-07-25

## 2019-07-26 NOTE — PROGRESS NOTES
HPI:   Matthew Plaza is a 66year old female who presents for a Medicare Subsequent Annual Wellness visit (Pt already had Initial Annual Wellness).     Patient comes in for Medicare annual physical exam overall she is been doing okay except only complaint s difficulties Managing Money/Bills based on screening of functional status. Managing money/bills: (P) Able without help  She has difficulties Shopping for Groceries based on screening of functional status.    Shop for groceries: (P) Cannot do without help complication (HCC)     Iron deficiency anemia due to chronic blood loss     Acute cystitis without hematuria     Foot fracture, left, closed, initial encounter     Recurrent falls     Closed nondisplaced fracture of fourth metatarsal bone of left foot with CHLORIDE ER 20 MEQ Oral Tab CR TAKE 1 TABLET(20 MEQ) BY MOUTH EVERY DAY   glimepiride 2 MG Oral Tab TAKE 1 TABLET BY MOUTH DAILY   ISOSORBIDE MONONITRATE ER 30 MG Oral Tablet 24 Hr TAKE 1 TABLET(30 MG) BY MOUTH DAILY   PANTOPRAZOLE SODIUM 40 MG Oral Tab EC Musculoskeletal: Negative. Skin: Negative. Allergic/Immunologic: Negative. Neurological: Positive for numbness. Psychiatric/Behavioral: Negative. All other systems reviewed and are negative.          EXAM:   /48 (BP Location: Right arm Visual Acuity  Right Eye Visual Acuity: Uncorrected Right Eye Chart Acuity: 20/200   Left Eye Visual Acuity: Uncorrected Left Eye Chart Acuity: 20/200   Both Eyes Visual Acuity: Uncorrected Both Eyes Chart Acuity: 20/200   Able To Tolerate Visual A1C; Future  -     LIPID PANEL;  Future  -     HEMOGLOBIN A1C; Future  -     VITAMIN B12; Future  -     OP EMH ALT REFERRAL HOME SLEEP APNEA TEST    Type 2 diabetes mellitus without complication, without long-term current use of insulin (HCC) A1c was very w Cyclobenzaprine HCl 10 MG Oral Tab; Take 1 tablet (10 mg total) by mouth 3 (three) times daily as needed for Muscle spasms.  -     gabapentin 100 MG Oral Cap; Take 1 capsule (100 mg total) by mouth nightly.          Diet assessment: good     PLAN:  The marcelo AA>50, > 65 Data entered on: 11/27/2018   Last Dilated Eye Exam 11/6/2018       Bone Density Screening      Dexascan Every two years No results found for this or any previous visit. No flowsheet data found.     Pap and Pelvic      Pap: Every 3 yrs a anticonvulsants.)    Potassium  Annually Potassium (mmol/L)   Date Value   11/08/2018 4.8    No flowsheet data found. Creatinine  Annually Creatinine (mg/dL)   Date Value   11/08/2018 1.00    No flowsheet data found.     Drug Serum Conc  Annually No resu

## 2019-07-26 NOTE — PATIENT INSTRUCTIONS
Sravan Ellis SCREENING SCHEDULE   Tests on this list are recommended by your physician but may not be covered, or covered at this frequency, by your insurer. Please check with your insurance carrier before scheduling to verify coverage.    PREVENTATIVE S years- more often if abnormal There are no preventive care reminders to display for this patient. Update Health Maintenance if applicable    Flex Sigmoidoscopy Screen  Covered every 5 years No results found for this or any previous visit.  No flowsheet data orders found for this or any previous visit. Please get once after your 65th birthday    Pneumococcal 23 (Pneumovax)  Covered Once after 65 No orders found for this or any previous visit.  Please get once after your 65th birthday    Hepatitis B for Moderate

## 2019-07-31 RX ORDER — AMLODIPINE BESYLATE 10 MG/1
TABLET ORAL
Qty: 90 TABLET | Refills: 1 | Status: SHIPPED | OUTPATIENT
Start: 2019-07-31

## 2019-07-31 RX ORDER — PANTOPRAZOLE SODIUM 40 MG/1
TABLET, DELAYED RELEASE ORAL
Qty: 90 TABLET | Refills: 1 | Status: SHIPPED | OUTPATIENT
Start: 2019-07-31

## 2019-07-31 NOTE — TELEPHONE ENCOUNTER
Refill passed per [facility], [facility] protocol.   Hypertensive Medications  Protocol Criteria:  · Appointment scheduled in the past 6 months or in the next 3 months  · BMP or CMP in the past 12 months  · Creatinine result < 2  Recent Outpatient Visits months ago Posterior tibial tendon dysfunction (PTTD) of left lower extremity    [facility], [facility].  [address], [place] Liat Gil DPM    Office Visit    5 months ago Essential hypertension    [facility], [facility], [address], [place]yn [place],

## 2019-08-07 ENCOUNTER — MED REC SCAN ONLY (OUTPATIENT)
Dept: INTERNAL MEDICINE CLINIC | Facility: CLINIC | Age: 74
End: 2019-08-07

## 2019-08-12 RX ORDER — HYDRALAZINE HYDROCHLORIDE 25 MG/1
TABLET, FILM COATED ORAL
Qty: 360 TABLET | Refills: 1 | Status: SHIPPED | OUTPATIENT
Start: 2019-08-12

## 2019-08-12 NOTE — TELEPHONE ENCOUNTER
Refill passed per [facility], [facility] protocol.   Hypertensive Medications  Protocol Criteria:  · Appointment scheduled in the past 6 months or in the next 3 months  · BMP or CMP in the past 12 months  · Creatinine result < 2  Recent Outpatient Visits

## 2019-08-23 ENCOUNTER — TELEPHONE (OUTPATIENT)
Dept: INTERNAL MEDICINE CLINIC | Facility: CLINIC | Age: 74
End: 2019-08-23

## 2019-08-23 RX ORDER — FUROSEMIDE 40 MG/1
TABLET ORAL
Qty: 90 TABLET | Refills: 0 | OUTPATIENT
Start: 2019-08-23

## 2019-08-23 NOTE — TELEPHONE ENCOUNTER
Spoke to patient, she is in [facility] for severe diarrhea, has had two blood transfusions, and now she being prepped for a colonoscopy.

## 2019-08-30 ENCOUNTER — LAB REQUISITION (OUTPATIENT)
Dept: LAB | Facility: HOSPITAL | Age: 74
End: 2019-08-30
Payer: COMMERCIAL

## 2019-08-30 DIAGNOSIS — C54.1 MALIGNANT NEOPLASM OF ENDOMETRIUM (HCC): ICD-10-CM

## 2019-08-30 PROCEDURE — 88360 TUMOR IMMUNOHISTOCHEM/MANUAL: CPT | Performed by: PATHOLOGY

## 2019-08-30 PROCEDURE — 88363 XM ARCHIVE TISSUE MOLEC ANAL: CPT | Performed by: PATHOLOGY

## 2019-09-02 RX ORDER — GLIMEPIRIDE 2 MG/1
TABLET ORAL
Qty: 90 TABLET | Refills: 1 | Status: SHIPPED | OUTPATIENT
Start: 2019-09-02

## 2019-09-02 NOTE — TELEPHONE ENCOUNTER
Refill passed per [facility], [facility] protocol.     Diabetes Medications  Protocol Criteria:  · Appointment scheduled in the past 6 months or the next 3 months  · A1C < 7.5 in the past 6 months  · Creatinine in the past 12 months  · Creatinine result < 1.5   Re

## 2019-11-05 ENCOUNTER — MED REC SCAN ONLY (OUTPATIENT)
Dept: INTERNAL MEDICINE CLINIC | Facility: CLINIC | Age: 74
End: 2019-11-05

## 2021-01-27 DIAGNOSIS — Z23 NEED FOR VACCINATION: ICD-10-CM

## 2022-01-13 NOTE — TELEPHONE ENCOUNTER
KARUNA. Spoke with Sandra Drew at [facility]. Continue your current medicines as prescribed and take azithromycin in addition. Please see your primary care provider in 2-3 days.  Call for appointment.  If you have any problems with followup, please call the ED Referral Coordinator at [phone].  Return to the ER if symptoms worsen or other concerns.    Cough    Coughing is a reflex that clears your throat and your airways. Coughing helps to heal and protect your lungs. It is normal to cough occasionally, but a cough that happens with other symptoms or lasts a long time may be a sign of a condition that needs treatment. Coughing may be caused by infections, asthma or COPD, smoking, postnasal drip, gastroesophageal reflux, as well as other medical conditions. Take medicines only as instructed by your health care provider. Avoid environments or triggers that causes you to cough at work or at home.    SEEK IMMEDIATE MEDICAL CARE IF YOU HAVE ANY OF THE FOLLOWING SYMPTOMS: coughing up blood, shortness of breath, rapid heart rate, chest pain, unexplained weight loss or night sweats.    Pneumonia    Pneumonia is an infection of the lungs. Pneumonia may be caused by bacteria, viruses, or funguses. Symptoms include coughing, fever, chest pain when breathing deeply or coughing, shortness of breath, fatigue, or muscle aches. Pneumonia can be diagnosed with a medical history and physical exam, as well as other tests which may include a chest X-ray. If you were prescribed an antibiotic medicine, take it as told by your health care provider and do not stop taking the antibiotic even if you start to feel better. Do not use tobacco products, including cigarettes, chewing tobacco, and e-cigarettes.    SEEK IMMEDIATE MEDICAL CARE IF YOU HAVE ANY OF THE FOLLOWING SYMPTOMS: worsening shortness of breath, worsening chest pain, coughing up blood, change in mental status, lightheadedness/dizziness.

## 2023-11-07 NOTE — PROGRESS NOTES
[facility] HOSP - [facility]    Hematology/Oncology   Progress Note    Cassie Martinezguru Patient Status:  Inpatient    1945 MRN [id number]   Location [facility]5W Attending Cora Lovelace MD   Hosp Day # 2 PCP Pool Sanchez MD     Subjective:  No myometrial involvement, but no definite extension to the cervix, parauterine soft tissues, adnexa, or visualized pelvic lymph nodes. 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   2. Rectal findings that could relate to inflammation or infect radiation. Will follow closely.      Deann Romero MD [place] per EMS

## 2024-02-22 NOTE — TELEPHONE ENCOUNTER
ok Detail Level: Zone Discussed risks and benefits of Efudex, PDT and lesion-directed therapies. Pt has an innumerable amount on the left lower jaw and is okay treating with Efudex and spot treating with cryotherapy on singular lesions. Strong recommendations were made for the use of UV protection. Discussed risks and benefits of topical/oral minoxidil and Spironolactone since pt has been using Nutrafol with no improvement. Pt would like to try topical Minoxidil.

## (undated) DEVICE — STERILE LATEX POWDER-FREE SURGICAL GLOVESWITH NITRILE COATING: Brand: PROTEXIS

## (undated) DEVICE — Device: Brand: JELCO

## (undated) DEVICE — Device

## (undated) DEVICE — COVER SGL STRL LGHT HNDL BLU

## (undated) DEVICE — WOLF INFLOW HYSTER

## (undated) DEVICE — D AND C PACK: Brand: MEDLINE INDUSTRIES, INC.

## (undated) DEVICE — STERILE POLYISOPRENE POWDER-FREE SURGICAL GLOVES: Brand: PROTEXIS

## (undated) DEVICE — SUCTION CANISTER, 3000CC,SAFELINER: Brand: DEROYAL

## (undated) DEVICE — TUBING SUCTION COLLECTION SET

## (undated) DEVICE — SOL  .9 1000ML BTL

## (undated) DEVICE — STIRRUP STRAP W/SLING RING

## (undated) DEVICE — LUBRICANT JLY SURGILUBE 2OZ

## (undated) NOTE — ED AVS SNAPSHOT
[place]   MRN: [id number]    Department:  [facility] Emergency Department   Date of Visit:  1/3/2018           Disclosure     Insurance plans vary and the physician(s) referred by the ER may not be covered by your plan.  Please contact your within the next three months to obtain basic health screening including reassessment of your blood pressure.     IF THERE IS ANY CHANGE OR WORSENING OF YOUR CONDITION, CALL YOUR PRIMARY CARE PHYSICIAN AT ONCE OR RETURN IMMEDIATELY TO THE EMERGENCY DEPARTMEN

## (undated) NOTE — IP AVS SNAPSHOT
[facility]            (For Outpatient Use Only) Initial Admit Date: 11/20/2017   Inpt/Obs Admit Date: Inpt: 11/20/17 / Obs: N/A   Discharge Date:    Skip Luis:  [de-identified]   MRN: [de-identified]   CSN: [id number]        ENCOUNTER  Patient Cla

## (undated) NOTE — IP AVS SNAPSHOT
Patient Demographics     Address  [address] [place] [address] Phone  [phone] [facility])  [phone] (Mobile) *Preferred* E-mail Address  [email or web address]      Emergency Contact(s)     Name Relation Home Work Mobile    Center Daughter furosemide 20 MG Tabs  Commonly known as:  LASIX  Next dose due:  11/30/2017 9am      Take 3 tablets (60 mg total) by mouth daily.   Stop taking on:  12/28/2017   Josey Crockett MD         glimepiride 2 MG Tabs  Commonly known as:  AMARYL  Next dose d Cyclobenzaprine HCl 10 MG Tabs  docusate sodium 100 MG Caps  ferrous sulfate 325 (65 FE) MG Tbec  furosemide 20 MG Tabs  glimepiride 2 MG Tabs  hydrALAzine HCl 25 MG Tabs  Insulin Aspart Pen 100 UNIT/ML Sopn  isosorbide dinitrate 10 MG Tabs  MedroxyPROGEST [id number] isosorbide dinitrate (ISORDIL) tab 10 mg 11/29/17 1349 Given      [id number] medroxyPROGESTERone (PROVERA) tab 11/29/17 0810 Given            RIGHT DELTOID     Order ID Medication Name Action Time Action Reason Comments    [id number] influenza vir BUN/CREA Ratio 33.7 10.0 - 20.0 [facility]   Anion Gap 7 0 - 18 mmol/L — [facility]   Calculated Osmolality 290 275 - 295 mOsm/kg — [facility]   GFR, Non-African American 55 >=60 L [facility]   GFR, African-American >60 >=60 [employer] Order Status:  Completed Lab Status:  Final result Updated:  11/27/17 0848    Specimen:  Stool from Stool      Occult Blood Negative    MRSA Screen by PCR Once [[id number]]  (Normal) Collected:  11/22/17 0015    Order Status:  Completed Lab Status:  Final Home Medications:    Prescriptions Prior to Admission:  glipiZIDE 10 MG Oral Tab Take 20 mg by mouth 2 (two) times daily before meals.  Disp:  Rfl:  11/19/2017 at Unknown time   MetFORMIN HCl 500 MG Oral Tab Take 1,000 mg by mouth 2 (two) times daily with m  11/20/2017   K 5.1 11/20/2017    11/20/2017   CO2 26 11/20/2017    11/20/2017   CA 8.4 11/20/2017   TROP 0.03 11/20/2017       Imaging:  Xr Foot, Complete (min 3 Views), Left (cpt=73630)    Result Date: 11/20/2017  CONCLUSION:  1.  Acute Cardiology Consult appreciated. Improved with Lasix, ECHO ordered. Foot fracture, left, closed, initial encounter  Will ask Podiatrist to see, will need PT. Essential hypertension  Continue home meds , add Amlodipine.       Type 2 diabetes mellitu respiratory acidosis noted. Unclear if prior history of documented obstructive sleep apnea or COPD. Patient started on BiPAP with improvement in hypersomnia. Repeat ABG still reveals evidence of respiratory acidosis.   Patient transferred to PCU for ongo IVPB-minibag/addvantage 2 g Intravenous Q24H   medroxyPROGESTERone (PROVERA) tab 10 mg Oral Daily   influenza virus vaccine (FLUAD) ages 72 years and older inj 0.5ml 0.5 mL Intramuscular Prior to discharge       Prescriptions Prior to Admission:  glipiZIDE of left fourth metatarsal. 2. Subtle nondisplaced fractures involving the metaphyseal bases of the left second third and fourth metatarsals.  3. Chronic pes planus deformity, left foot with advanced osteoarthritis changes involving the subtalar joints and m -We will improvement since starting BiPAP therapy. Repeat ABG reviewed. Continue BiPAP at this time.  -Severe pulmonary hypertension noted on echo. Diuresed well throughout hospitalization. Further recommendations in regards to diuresis per cardiology. ---- Study Conclusions 1. Left ventricle: The cavity size was normal. Wall thickness was increased in    a pattern of moderate LVH. Systolic function was normal. The estimated    ejection fraction was 65%.  Wall motion was normal; there were no regional Aortic valve:   Trileaflet; mildly thickened, mildly calcified leaflets. Doppler: There was mild stenosis. No significant regurgitation. Valve area: 2.28cm^2(VTI). Indexed valve area: 0.82cm^2/m^2 (VTI). Mean gradient: 15mm Hg (S). Aorta:   Oleta Balding ------- LVOT Area                                            3.46 cm^2     ------- Aorta Root diameter, ED                                 28 mm       ------- Left atrium Anterior-posterior dimension                      46 mm       ------- Anterior-poster 15 mm Hg    ------- Right ventricle RV pressure, S                                   *83 mm Hg    <30 Legend: Mean values are shown as u=mean value. Asterisk (*) marks values outside specified normal range.  Electronically signed       11/21/2017 07:43 [id number] WAYNE been using it more recently.   Patient reports prior to the fall she was able to walk approximately 15 feet per attempt using a RW and reported that her daughter helped get her up and out of bed as she was not able to perform bed mobility or transfers witho Continued skilled PT indicated. If patient's level of arousal improves she could progress to working on sitting at the EOB; however, at this time she is fully dependent for all mobility needs.  She does not have the physical skills to return home as she is Stairs to Bedroom: 0       Lives With: Daughter     Patient Owned Equipment: Rolling walker;Cane;Other (Comment) (recently started using a wheelchair- it is at home)[SD.2]       Prior Level of [place]: See above.      SUBJECTIVE  \"I don't remember the -   Turning over in bed (including adjusting bedclothes, sheets and blankets)?: Unable   -   Sitting down on and standing up from a chair with arms (e.g., wheelchair, bedside commode, etc.): Unable   -   Moving from lying on back to sitting on the side of Goal #2 Patient is able to demonstrate transfers MAX A x 2 stand-pivot bed>wheelchair.  Use of sliding board if needed.[SD.1] NWB LLE.[SD.3]      Goal #2  Current Status    Goal #3 Patient to demonstrate active participation with home activity/exercise inst Podiatry consult completed this morning: Dr Asael Mak stated patient is to remain NWB LLE for 3-4 weeks and notes that patient would not be able to use knee scooter due to hx of knee limitations.  She noted patient will most likely require w/c and patient repor level of arousal improves.  PT educated patient on how to use the bed mechanics, trapeze and how to perform supine therex for UE/LE (aside from L ankle) but her return demo was limited due to being drowsy and limitations related to her very limited activity • Essential hypertension    • Hyperlipidemia[SD.2]        Past Surgical History[SD.1]  Past Surgical History:  No date: KNEE REPLACEMENT SURGERY[SD.2]    HOME SITUATION[SD.1]  Type of Home: House   Home Layout: Able to live on main level  Stairs to Enter : NEUROLOGICAL FINDINGS                      ACTIVITY TOLERANCE  3L NC 02. 89% at rest. HR 81. Very poor tolerance for any activity. Falling asleep throughout session.      AM-PAC '6-Clicks' INPATIENT SHORT FORM - BASIC MOBILITY  How much difficulty does the Goals to be met by: 12/1/17  Patient Goal Patient's self-stated goal is: none stated   Goal #1 Patient is able to demonstrate supine - sit EOB @ level: MAX A then sit EOB for 5 minutes with SBA in preparation for transfers.       Goal #1   Current Status respiratory distress and was transferred to CCU and placed on BiPap.  Podiatry consult completed this morning: Dr Giacomo Hills stated patient is to remain NWB LLE for 3-4 weeks and notes that patient would not be able to use knee scooter due to hx of R knee limita RN and PCT to use mechanics of the bed to bring her more upright as her level of arousal improves.  PT educated patient on how to use the bed mechanics, trapeze and how to perform supine therex for UE/LE (aside from L ankle) but her return demo was limited Past Medical History:   Diagnosis Date   • Back pain    • Diabetes ([facility] [address]    • Essential hypertension    • Hyperlipidemia[SD.2]        Past Surgical History[SD.1]  Past Surgical History:  No date: KNEE REPLACEMENT SURGERY[SD.2]    HOME SITUATION[SD.1]  Type ADDITIONAL TESTS                                    NEUROLOGICAL FINDINGS                      ACTIVITY TOLERANCE  3L NC 02. 89% at rest. HR 81. Very poor tolerance for any activity. Falling asleep throughout session.      AM-PAC '6-Clicks' INPATIENT S of session/findings; All patient questions and concerns addressed; Family present[SD. 2]    CURRENT GOALS    Goals to be met by: 12/1/17  Patient Goal Patient's self-stated goal is: none stated   Goal #1 Patient is able to demonstrate supine - sit EOB @ level Pulmonary hypertension, moderate to severe    Acute diastolic congestive heart failure (HCC)      ASSESSMENT   Received pt supine in the bed. Coordinated with OT for treatment session.   Pt performed exercises in supine on B LE's to increase strength/endu ACTIVITY TOLERANCE  O2 Saturation: 92%  Liters of O2:  3  Shortness of breath    AM-PAC '6-Clicks' INPATIENT SHORT FORM - BASIC MOBILITY  How much difficulty does the patient currently have. ..  -   Turning over in bed (including adjusting bedclothes, sheet Goal #4    Goal #4   Current Status    Goal #5    Goal #5   Current Status    Goal #6    Goal #6  Current Status[RM. 1]         Attribution Key    RM. 1 - Demetri Fountain, PTA on 11/27/2017  3:01 PM                        Occupational Therapy Notes (last 7 unsupported sitting and tolerate <5 minutes. She was SOB with activity, however o2 remained >93%. Patient returned to supine with all needs met. Discussed with patient and RN she will need bariatric chair.  She is not safe to attempt sit <> stands from [place] Standardized Score (AM-PAC Scale): 32.03  CMS Modifier (G-Code): CL[AO.2]    FUNCTIONAL TRANSFER ASSESSMENT[AO.1]  Supine to Sit : Maximum assistance (x2)  Sit to Stand: Not tested[AO.2]  Toilet Transfer: dep  Shower Transfer: dep  Chair Transfer: dep    B

## (undated) NOTE — IP AVS SNAPSHOT
[facility]            (For Outpatient Use Only) Initial Admit Date: 1/8/2018   Inpt/Obs Admit Date: Inpt: 1/9/18 / Obs: 01/09/18   Discharge Date:    Hospital Acct:  [de-identified]   MRN: [de-identified]   CSN: [id number]        [place] [id number]

## (undated) NOTE — IP AVS SNAPSHOT
Patient Demographics     Address  [address] [place] [address] Phone  [phone] [facility])  [phone] (Mobile) *Preferred* E-mail Address  [email or web address]      Emergency Contact(s)     Name Relation Home Work Mobile    [employer] Stop taking on:  12/28/2017   Mary Aguero MD         DUONEB 0.5-2.5 (3) MG/3ML Soln  Generic drug:  ipratropium-albuterol  Next dose due:  Anytime as needed      Take 3 mL by nebulization every 6 (six) hours as needed (shortness of breath).           ferr Next dose due:  Anytime as needed      Apply 1 Application topically as needed for Itching. Delia Tinajero MD         PEG 3350 Pack  Commonly known as:  MIRALAX  Next dose due:  12/12/17 evening      Take 17 g by mouth 2 (two) times daily.   Stop taking o [id number] isosorbide dinitrate (ISORDIL) tab 10 mg 12/11/17 1902 Given      [id number] isosorbide dinitrate (ISORDIL) tab 10 mg 12/12/17 1037 Given      [id number] sodium chloride 0.9 % infusion 12/12/17 0049 New Bag                    Recent Vital Signs ---------                               -----------         ------                     CBC W/ DIFFERENTIAL[[id number]]          Abnormal            Final result                 Please view results for these tests on the individual orders.     Specimen Inform Participating in therapies but endurance is low. Hemoglobin trending down, today 6.8, down from 9.3 on 12/3. Pt brought in for blood transfusion.     Will await further input from Dr. Pretty Carnes in terms of further management of vaginal bleeding and ? transfus hydrALAzine HCl 25 MG Oral Tab Take 3 tablets (75 mg total) by mouth every 8 (eight) hours. AmLODIPine Besylate 5 MG Oral Tab Take 1 tablet (5 mg total) by mouth 2 (two) times daily.  (Patient taking differently: Take 10 mg by mouth daily.  )   AQUAPHOR E BUN 32 (H) 12/07/2017    (L) 12/07/2017   K 4.5 12/07/2017   CL 94 (L) 12/07/2017   CO2 29 12/07/2017    (H) 12/07/2017   CA 8.7 12/07/2017   ALB 2.4 (L) 11/30/2017   ALKPHO 78 11/30/2017   BILT 1.7 (H) 11/30/2017   TP 5.3 (L) 11/30/2017   AST spectral Doppler, and color Doppler ------------------------------------------------------------------------------- Naomie NICHOLS 72 05/22/1945 H: [id number]                                            E: [id number] Scanning was performed from the parasternal, apical, and subcostal acoustic windows. Study completion:  The patient tolerated the procedure well. There were no complications. Transthoracic echocardiography.   M-mode, complete 2D, complete spectral Doppler, respirophasic diameter changes were blunted (< 50%); findings are consistent with elevated central venous pressure. ------------------------------------------------------------------------------- 2D measurements mm Hg    ------- Valve area, VTI                                 2.28 cm^2     ------- Valve area index, VTI                           0.82 cm^2/m^2 ------- Mitral valve Peak E-wave velocity                             122 cm/s     ------- Peak A-wave velo

## (undated) NOTE — IP AVS SNAPSHOT
Patient Demographics     Address  [address] [place] [address] Phone  [phone] [facility])  [phone] (Mobile) *Preferred* E-mail Address  [email or web address]      Emergency Contact(s)     Name Relation Home Work Mobile    Center Daughter Stop taking on:  12/29/2017   Candance Shack, MD         hydrALAzine HCl 25 MG Tabs  Commonly known as:  APRESOLINE      Take 3 tablets (75 mg total) by mouth every 8 (eight) hours.   Stop taking on:  12/28/2017   Candance Shack, MD         Insulin Aspart Pen 1 [id number] docusate sodium (COLACE) cap 100 mg 12/02/17 2048 Given      [id number] docusate sodium (COLACE) cap 100 mg 12/03/17 0813 Given      [id number] ferrous sulfate EC tab 325 mg 12/02/17 1848 Given      [id number] ferrous sulfate EC tab 325 mg 12/03/17 Sodium 136 136 - 144 mmol/L — [facility]   Potassium 3.8 3.3 - 5.1 mmol/L — [facility]   Chloride 92 95 - 110 mmol/L L [facility]   CO2 34 22 - 32 mmol/L H [facility]   BUN 32 8 - 20 mg/dL H [facility]   Creatinine 1.02 0.50 - 1.50 mg/dL — [facility] Order Status:  Completed Lab Status:  Final result Updated:  11/30/17 1344    Specimen:  Other from [place]      MRSA Screen By PCR Negative         H&P - H&P Note      H&P signed by Sandra Hairston MD at 12/1/2017  8:40 AM  Version 1 of 1    Author:  Loan Banks isosorbide dinitrate 10 MG Oral Tab Take 1 tablet (10 mg total) by mouth TID (Nitrates).  Disp: 90 tablet Rfl: 0    glimepiride 2 MG Oral Tab Take 1 tablet (2 mg total) by mouth daily with breakfast. Disp: 30 tablet Rfl: 0    Insulin Aspart Pen 100 UNIT/ML Eyes: EOM are normal. Pupils are equal, round, and reactive to light. Neck: Neck supple. Cardiovascular: Normal rate and regular rhythm. Pulmonary/Chest: Effort normal. She has no rales. Abdominal: Soft. She exhibits no mass.  There is no tendernes [facility]* -------------------------------------------------------------------------------      Transthoracic Echocardiography M-mode, complete 2D, complete spectral Doppler, and color Doppler ------------------ ---- Study data:  No prior study was available for comparison. Study status: Elective. Procedure:  Transthoracic echocardiography. Image quality was fair. The study was technically limited due to body habitus.  Scanning was performed from the parasternal, evidence for stenosis. Mild-moderate regurgitation. Right atrium:  The atrium was dilated. Pericardium:  There was no pericardial effusion. Systemic veins: Inferior vena cava:  The vessel was dilated; the respirophasic diameter changes were blunted (< 50 ------- Aortic valve Mean velocity, S                                 182 cm/s     ------- VTI, S                                          52.1 cm       ------- Mean gradient, S                                  15 mm Hg    ------- Valve area, VTI Type of Evaluation: Initial  Physician Order: PT Eval and Treat    Presenting Problem: vaginal bleeding ---pt with newly diagnosis of uternine CA --pt with prev admission  11/20--11/29/17 d[facility] [place]  pt at rehab center less then 24 hrs safety. Pt sat at EOB approx 12-15 min this session. [KS.1]      Pt tolerated there exer well overall.[KS.2]   Pt with inability to transfer at this time to a chair safely maintaining NON WB status on left LE .--therefore have recommended to RN staff use of Date of Admission:  11/30/2017     HPI:   Jessica William is a(n) 74year old female with PMH of  Obesity, DM type 2, HTN admitted from SNF for   Evaluation of increased vaginal spotting.  Had endometrial biopsy  during her previous admittion , was given   [address] Lives With: Daughter  Drives: Other (Comment)  Patient Owned Equipment: Rolling walker  Patient Regularly Uses:  (RW)    Prior Level of [place]:[KS.1]  See assessment[KS. 2]        SUBJECTIVE[KS. 1]  Agreeable to mobility  Request  dtr was How much difficulty does the patient currently have. ..  -   Turning over in bed (including adjusting bedclothes, sheets and blankets)?: Unable   -   Sitting down on and standing up from a chair with arms (e.g., wheelchair, bedside commode, etc.): Unable Goal #3[KS. 1] Pt able to sit at EOB for 15 min for there activity and exercise participation with SBA[KS. 2]    Goal #3   Current Status    Goal #4    Goal #4   Current Status    Goal #5 Patient to demonstrate[KS. 1] min assist throughout[KS. 2]  With[KS. 1] and LE AROM exercises while sitting EOB. She is able to complete grooming tasks with Min A at EOB. In this OT evaluation patient presents with the following impairments: decreased overall strength with new limitations from NWB LLE, decreased endurance.  Pt and perform her adls. She lives with her daughter (who works). [facility] SUBJECTIVE  \"I used to be able to get up with my walker. \"      OCCUPATIONAL THERAPY EXAMINATION      OBJECTIVE  Precautions: Limb alert - left  Fall Risk: H Bedroom Mobility: NT    BALANCE ASSESSMENT  Static Sitting: good  Dynamic Sitting: fair+  Static Standing: NT  Dynamic Standing: NT      FUNCTIONAL ADL ASSESSMENT  Grooming: Min A to use dry shampoo and comb hair sitting EOB  Feeding: NT  Bathing: Max A  T

## (undated) NOTE — IP AVS SNAPSHOT
Patient Demographics     Address  [facility] [address] Phone  [phone] [facility])  [phone] (Mobile) *Preferred* E-mail Address  [email or web address]      Emergency Contact(s)     Name Relation Home Work Mobile    Berenice Valadez Inject into the skin 4 (four) times daily before meals and nightly. ipratropium-albuterol 0.5-2.5 (3) MG/3ML Soln  Commonly known as:  DUONEB      Take 3 mL by nebulization every 4 (four) hours as needed (shortness of breath).           lisinopril [id number] Megestrol Acetate (MEGACE) tab 80 mg 02/10/18 1305 Given      [id number] Montelukast Sodium (SINGULAIR) tab 10 mg 02/09/18 2033 Given      [id number] Pantoprazole Sodium (PROTONIX) EC tab 40 mg 02/10/18 0503 Given      [id number] Vitamin B-12 (WON Mask size  Small   Set CPAP  5   O2%  21 %         Lab Results Last 24 Hours      FERRITIN [[id number]] (Abnormal)  Resulted: 02/10/18 0829, Result status: Final result   Ordering provider:  Andrzej Desai MD  02/09/18 2300 Resulting lab:  [facility] Ordering provider:  Zena Key MD  02/09/18 230 Resulting lab:  [facility]    Specimen Information    Type Source Collected On   Blood — 02/10/18 0645          Components    Component Value Reference Range Flag Lab   Iron 144 28 - 170 m Order Status:  Completed Lab Status:  Final result Updated:  02/10/18 0630    Specimen:  Urine from Urine, clean catch      Urine Culture <10,000 cfu/ml Three or more species of Gram negative bacilli; none predominant. No further workup performed.     Cheko Feliz Pt also with hs of c diff was treated , still with some diarrhea pt was continued with vanco ,        History     Past Medical History:   Diagnosis Date   • Back pain    • Cancer ([facility] [address]     uterine cancerr   • Congestive heart disease ([facility] [address]    • COPD (chron ipratropium-albuterol 0.5-2.5 (3) MG/3ML Inhalation Solution Take 3 mL by nebulization 4 (four) times daily. Guaifenesin--10 MG/5ML Oral Syrup Take 10 mL by mouth every 6 (six) hours.    INSULIN ASPART 100 UNIT/ML Subcutaneous Solution Inject into t WBC 4.1 02/08/2018   HGB 6.9 (LL) 02/09/2018   HCT 20.0 (L) 02/09/2018    02/08/2018   CREATSERUM 1.10 02/08/2018   BUN 28 (H) 02/08/2018    (L) 02/08/2018   K 4.6 02/08/2018    02/08/2018   CO2 20 (L) 02/08/2018    (H) 02/08/2018 H&P signed by Bozena Lipscomb MD at 2/9/2018 10:39 AM  Version 1 of 2    Author:  Bozena Lipscomb MD Service:  Internal Medicine Author Type:  Physician    Filed:  2/9/2018 10:39 AM Date of Service:  2/9/2018 10:29 AM Status:  Signed    : No date: TOTAL KNEE REPLACEMENT  Family History   Problem Relation Age of Onset   • Cancer Mother      ovarian   • Other [OTHER] Father      septicemia     Social History:  Smoking status: Never Smoker Megestrol Acetate 40 MG Oral Tab Take 2 tablets (80 mg total) by mouth 4 (four) times daily. Miconazole Nitrate 2 % External Powder Apply 1 Application topically as needed for Itching. Review of Systems:     Constitutional: Positive for fatigue. inspiration. 3. Borderline cardiomegaly. 4. Gaseous distention of bowel seen at edge of field-of-view.                Assessment/Plan:   Anemia, unspecified type- was transfused 2 units hg up to 6.9, hematology ordered 1 more unit , discussed with hematolog REASON FOR CONSULTATION:  Transfusion-dependent macrocytic anemia, stage I endometrial cancer, undergoing radiotherapy.     HISTORY OF PRESENT ILLNESS:  The patient is a very pleasant 15-year-old female known to Hematology with a history of severe transfusi MEDICATIONS:  Outpatient medications, docusate, bisacodyl, magnesium, cyclobenzaprine, Megace, lisinopril, DuoNeb, guaifenesin, insulin. ALLERGIES:  No known drug allergies. SOCIAL HISTORY:  The patient is a never smoker.   She is originally from [place] significant extravascular fluid with subsequent shifts at the time of transfusion. She does not appear to be having any acute blood loss at this time.       The patient does have a markedly elevated ferritin with previous adequate iron saturation indicatin Video Swallow Study Notes     No notes of this type exist for this encounter. SLP Notes     No notes of this type exist for this encounter.             Immunizations     Name Date      Fluad 0.5ml 11/29/17

## (undated) NOTE — IP AVS SNAPSHOT
[facility]            (For Outpatient Use Only) Initial Admit Date: 2/8/2018   Inpt/Obs Admit Date: Inpt: 2/8/18 / Obs: N/A   Discharge Date:    Lisa Wilkins:  [de-identified]   MRN: [de-identified]   CSN: [id number]        ENCOUNTER  Patient Class:

## (undated) NOTE — IP AVS SNAPSHOT
[facility]            (For Outpatient Use Only) Initial Admit Date: 12/11/2017   Inpt/Obs Admit Date: Inpt: N/A / Obs: 12/11/17   Discharge Date:    Will Musa:  [de-identified]   MRN: [de-identified]   CSN: [id number]        ENCOUNTER  Patient Cla December 12, 2017

## (undated) NOTE — ED AVS SNAPSHOT
Bill Reyna   MRN: [id number]    Department:  [facility] Emergency Department   Date of Visit:  12/7/2017           Disclosure     Insurance plans vary and the physician(s) referred by the ER may not be covered by your plan.  Please contact your within the next three months to obtain basic health screening including reassessment of your blood pressure.     IF THERE IS ANY CHANGE OR WORSENING OF YOUR CONDITION, CALL YOUR PRIMARY CARE PHYSICIAN AT ONCE OR RETURN IMMEDIATELY TO THE EMERGENCY DEPARTMEN

## (undated) NOTE — IP AVS SNAPSHOT
Patient Demographics     Address  [id number] [place] [address] [facility] [address] Phone  [phone] [facility])  [phone] (Mobile) *Preferred*      Emergency Contact(s)     Name Relation Home Work Mobile    Thiago Friend   [phone]      Allergies as of Guaifenesin--10 MG/5ML Syrp  Commonly known as:  ROBITUSSIN DM  Next dose due:  Every 6 hrs as needed      Take 10 mL by mouth every 6 (six) hours.           insulin aspart 100 UNIT/ML Soln  Commonly known as:  NOVOLOG      Inject into the skin Bring a paper prescription for each of these medications  Ferrous Sulfate 325 (65 Fe) MG Tabs  Pantoprazole Sodium 40 MG Tbec           [phone] - MAR ACTION REPORT  (last 24 hrs)    ** SITE UNKNOWN **     Order ID Medication Name Action Time Action Reaso [id number] vancomycin (FIRST-VANCOMYCIN 50) 50 MG/ML oral solution 125 mg 01/11/18 2230 Given      [id number] vancomycin (FIRST-VANCOMYCIN 50) 50 MG/ML oral solution 125 mg 01/12/18 0527 Given      [id number] vancomycin (FIRST-VANCOMYCIN 50) 50 MG/ML oral so BUN/CREA Ratio 28.9 10.0 - 20.0 [facility]   Anion Gap 4 0 - 18 mmol/L — [facility]   Calculated Osmolality 286 275 - 295 mOsm/kg — [facility]   GFR, Non-African American >60 >=60 — [facility]   GFR, African-American >60 >=60 [employer]   Com Order Status:  Completed Lab Status:  Final result Updated:  01/09/18 1942    Specimen:  Stool from Stool      Occult Blood Negative    MRSA Screen by PCR Once [[id number]]  (Normal) Collected:  01/09/18 0139    Order Status:  Completed Lab Status:  Final laboratory work showed her white blood cell count to be 6900 and platelet count 376,875. The patient denies any melena or hematochezia. Again, she denies any vaginal bleeding within the last week. She denies any other areas of bruising or bleeding.   Jazmin Hyde ipratropium-albuterol 0.5-2.5 (3) MG/3ML Inhalation Solution Take 3 mL by nebulization every 4 (four) hours as needed (shortness of breath). ipratropium-albuterol 0.5-2.5 (3) MG/3ML Inhalation Solution Take 3 mL by nebulization 4 (four) times daily.    li HGB 7.5 (L) 01/09/2018   HCT 22.3 (L) 01/09/2018    01/09/2018   CREATSERUM 0.92 01/09/2018   BUN 30 (H) 01/09/2018    (L) 01/09/2018   K 4.4 01/09/2018    01/09/2018   CO2 24 01/09/2018    (H) 01/09/2018   CA 8.2 (L) 01/09/2018 ATTENDING PHYSICIAN: Hilario Cabrera MD   CONSULTING PHYSICIAN: Elizabeth Herrera.  Albina Triana MD   PATIENT ACCOUNT#:   [id number]    LOCATION:  [place] [address] RECORD #:   [id number]       YOB: 1945  ADMISSION DATE:       01/08/2018      CONSULT DA PAST MEDICAL HISTORY:  Stage I endometrial cancer, diabetes mellitus, morbid obesity, COPD, fibromyalgia, hyperlipidemia, hypertension, osteoarthritis, sleep apnea, status post total knee replacement.     MEDICATIONS:  Docusate, bisacodyl, magnesium, cyclob either blood loss anemia or peripheral destruction. We will send a reticulocyte count to help evaluate further. We will also send LDH and haptoglobin and review of peripheral blood film.   Given she is macrocytic, B12 and folate have already appropriately transfer to the EOB. Pt is cued for correct technique. Pt sat EOB for 20 minutes with CGA>SBA for sitting balance. Pt worked on her core stabilization while sitting EOB.  Did not stand pt at this time due to her bed not able to get low enough for pt to retu Raw Score: 8   PT Approx Degree of Impairment Score: 86.62%   Standardized Score (AM-PAC Scale): 28.58   CMS Modifier (G-Code): CM    FUNCTIONAL ABILITY STATUS  Gait Assessment   Gait Assistance: Not tested              Comment : sat EOB for 20 minutes wit Physical Therapy Note signed by Kashif Viveros PT at 1/10/2018  4:35 PM  Version 1 of 1    Author:  Kashif Viveros PT Service:  Rehab Author Type:  Physical Therapist    Filed:  1/10/2018  4:35 PM Date of Service:  1/10/2018  3:57 PM Status PT Discharge Recommendations: Sub-acute rehabilitation[TF.2]    PLAN[TF.1]  PT Treatment Plan: Bed mobility; Body mechanics; Endurance; Patient education;Strengthening;Transfer training;Balance training  Rehab Potential : Fair  Frequency (Obs):  (3<>5x/wk)[TF is NWB on the L LE. Was just beginning to  rehab with 2<>3 PA.   Prior to fall: pt is amb at a rooms distance with rollator walker and is mod I in her mobilities[TF.3]    SUBJECTIVE[TF.1]  I have not walk since 11/20/2018[TF.3]    PHYSICAL THERAPY E -   Moving from lying on back to sitting on the side of the bed?: Unable[TF.2]   How much help from another person does the patient currently need. ..[TF.1]   -   Moving to and from a bed to a chair (including a wheelchair)?: Total   -   Need to walk in hos [employer]    TF. 1 - Kaycee Thurman, PT on 1/10/2018  3:57 PM  TF.2 - Kaycee Thurman, PT on 1/10/2018  4:31 PM  TF.3 - Kaycee Thurman, PT on 1/10/2018  4:01 PM                        Occupational Therapy Notes (last 72 hours) (No height of bed and safety to transition patient back into bed - patient does wear support shoe/boot on left leg in standing  The patient is below baseline and would benefit from skilled inpatient OT to address the above deficits, maximizing patient's abilit at home  - at rehab at [address] patient was using a total lift  - was getting dressed by staff in supine - not transferring to the toilet     SUBJECTIVE  \" I think I might be going back to rehab today\"       [address] sitting, role of OT  Patient End of Session: Up in chair;Needs met;Call light within reach;RN aware of session/findings; All patient questions and concerns addressed      OT Goals    Patients self stated goal is: go back to rehab and working on standing

## (undated) NOTE — LETTER
Sherrie Duke [address]  [facility] [address], [place], [place]  [address]  INFORMED CONSENT FOR TRANSFUSION OF BLOOD OR BLOOD PRODUCTS   My physician has informed me of the nature, purpose, benefits and risks of transfusion for blood and blood components sammy (Signature of Patient)                                                           (Responsible party in case of Minor,                                                                                                Incompetent, or unconscious Patient)  _____

## (undated) NOTE — LETTER
Sherrie Duke [address]  [facility] [place], [place]  [address]  INFORMED CONSENT FOR TRANSFUSION OF BLOOD OR BLOOD PRODUCTS   My physician has informed me of the nature, purpose, benefits and risks of transfusion for blood and blood components sammy (Signature of Patient)                                                           (Responsible party in case of Minor,                                                                                                Incompetent, or unconscious Patient)  _____